# Patient Record
Sex: MALE | Race: WHITE | Employment: FULL TIME | URBAN - METROPOLITAN AREA
[De-identification: names, ages, dates, MRNs, and addresses within clinical notes are randomized per-mention and may not be internally consistent; named-entity substitution may affect disease eponyms.]

---

## 2017-03-20 RX ORDER — SIMVASTATIN 80 MG
TABLET ORAL
Qty: 90 TABLET | Refills: 1 | Status: SHIPPED | OUTPATIENT
Start: 2017-03-20 | End: 2017-07-14

## 2017-03-22 ENCOUNTER — OFFICE VISIT (OUTPATIENT)
Dept: INTERNAL MEDICINE CLINIC | Facility: CLINIC | Age: 67
End: 2017-03-22

## 2017-03-22 ENCOUNTER — HOSPITAL ENCOUNTER (OUTPATIENT)
Dept: CT IMAGING | Age: 67
Discharge: HOME | End: 2017-03-22
Attending: PHYSICIAN ASSISTANT
Payer: COMMERCIAL

## 2017-03-22 ENCOUNTER — TELEPHONE (OUTPATIENT)
Dept: INTERNAL MEDICINE CLINIC | Facility: CLINIC | Age: 67
End: 2017-03-22

## 2017-03-22 VITALS
HEART RATE: 56 BPM | DIASTOLIC BLOOD PRESSURE: 78 MMHG | BODY MASS INDEX: 24.47 KG/M2 | SYSTOLIC BLOOD PRESSURE: 132 MMHG | HEIGHT: 75 IN | TEMPERATURE: 98 F | WEIGHT: 196.81 LBS | RESPIRATION RATE: 16 BRPM

## 2017-03-22 DIAGNOSIS — R31.9 HEMATURIA: ICD-10-CM

## 2017-03-22 DIAGNOSIS — R31.9 HEMATURIA: Primary | ICD-10-CM

## 2017-03-22 LAB
BASOPHILS # BLD AUTO: 0.02 X10(3) UL (ref 0–0.1)
BASOPHILS NFR BLD AUTO: 0.3 %
EOSINOPHIL # BLD AUTO: 0.14 X10(3) UL (ref 0–0.3)
EOSINOPHIL NFR BLD AUTO: 2 %
ERYTHROCYTE [DISTWIDTH] IN BLOOD BY AUTOMATED COUNT: 11.9 % (ref 11.5–16)
HCT VFR BLD AUTO: 42.9 % (ref 37–53)
HGB BLD-MCNC: 14.7 G/DL (ref 13–17)
IMMATURE GRANULOCYTE COUNT: 0.03 X10(3) UL (ref 0–1)
IMMATURE GRANULOCYTE RATIO %: 0.4 %
LYMPHOCYTES # BLD AUTO: 2.5 X10(3) UL (ref 0.9–4)
LYMPHOCYTES NFR BLD AUTO: 34.9 %
MCH RBC QN AUTO: 34.2 PG (ref 27–33.2)
MCHC RBC AUTO-ENTMCNC: 34.3 G/DL (ref 31–37)
MCV RBC AUTO: 99.8 FL (ref 80–99)
MONOCYTES # BLD AUTO: 0.73 X10(3) UL (ref 0.1–0.6)
MONOCYTES NFR BLD AUTO: 10.2 %
MULTISTIX EXPIRATION DATE: ABNORMAL DATE
MULTISTIX LOT#: ABNORMAL NUMERIC
NEUTROPHIL ABS PRELIM: 3.74 X10 (3) UL (ref 1.3–6.7)
NEUTROPHILS # BLD AUTO: 3.74 X10(3) UL (ref 1.3–6.7)
NEUTROPHILS NFR BLD AUTO: 52.2 %
PH, URINE: 6 (ref 4.5–8)
PLATELET # BLD AUTO: 153 10(3)UL (ref 150–450)
PROTEIN (URINE DIPSTICK): 100 MG/DL
RBC # BLD AUTO: 4.3 X10(6)UL (ref 3.8–5.8)
RED CELL DISTRIBUTION WIDTH-SD: 43.3 FL (ref 35.1–46.3)
SPECIFIC GRAVITY: >1.03 (ref 1–1.03)
UROBILINOGEN,SEMI-QN: 1 MG/DL (ref 0–1.9)
WBC # BLD AUTO: 7.2 X10(3) UL (ref 4–13)

## 2017-03-22 PROCEDURE — 80053 COMPREHEN METABOLIC PANEL: CPT | Performed by: PHYSICIAN ASSISTANT

## 2017-03-22 PROCEDURE — 74178 CT ABD&PLV WO CNTR FLWD CNTR: CPT

## 2017-03-22 PROCEDURE — 87086 URINE CULTURE/COLONY COUNT: CPT | Performed by: PHYSICIAN ASSISTANT

## 2017-03-22 PROCEDURE — 85025 COMPLETE CBC W/AUTO DIFF WBC: CPT | Performed by: PHYSICIAN ASSISTANT

## 2017-03-22 PROCEDURE — 81003 URINALYSIS AUTO W/O SCOPE: CPT | Performed by: PHYSICIAN ASSISTANT

## 2017-03-22 PROCEDURE — 99214 OFFICE O/P EST MOD 30 MIN: CPT | Performed by: PHYSICIAN ASSISTANT

## 2017-03-22 NOTE — PROGRESS NOTES
Patient presents with:  Hematuria: Pt c/o dark red urine x 24 hrs- Pt denies any pain w/ urination       HPI:  Pt presents with c/o hematuria. Started last night. No pain. Some urinary frequency last night (up 5 times) but better today. No f/c.   No abd MOUTH ONCE DAILY Disp: 90 tablet Rfl: 1   AmLODIPine Besylate (NORVASC) 5 MG Oral Tab TAKE ONE TABLET BY MOUTH ONCE DAILY Disp: 90 tablet Rfl: 1   Multiple Vitamins-Minerals (MULTI FOR HIM 50+ OR) Take  by mouth.  Disp:  Rfl:    Aspirin 325 MG Oral Tab Take

## 2017-03-23 LAB
ALBUMIN SERPL-MCNC: 4.1 G/DL (ref 3.5–4.8)
ALP LIVER SERPL-CCNC: 65 U/L (ref 45–117)
ALT SERPL-CCNC: 35 U/L (ref 17–63)
AST SERPL-CCNC: 18 U/L (ref 15–41)
BILIRUB SERPL-MCNC: 0.9 MG/DL (ref 0.1–2)
BUN BLD-MCNC: 20 MG/DL (ref 8–20)
CALCIUM BLD-MCNC: 9.7 MG/DL (ref 8.3–10.3)
CHLORIDE: 104 MMOL/L (ref 101–111)
CO2: 26 MMOL/L (ref 22–32)
CREAT BLD-MCNC: 1.03 MG/DL (ref 0.7–1.3)
GLUCOSE BLD-MCNC: 121 MG/DL (ref 70–99)
M PROTEIN MFR SERPL ELPH: 7.9 G/DL (ref 6.1–8.3)
POTASSIUM SERPL-SCNC: 4.1 MMOL/L (ref 3.6–5.1)
SODIUM SERPL-SCNC: 140 MMOL/L (ref 136–144)

## 2017-03-24 DIAGNOSIS — R31.9 HEMATURIA: ICD-10-CM

## 2017-03-24 DIAGNOSIS — E11.9 TYPE 2 DIABETES MELLITUS WITHOUT COMPLICATION, WITHOUT LONG-TERM CURRENT USE OF INSULIN (HCC): ICD-10-CM

## 2017-03-24 DIAGNOSIS — K86.1 CHRONIC PANCREATITIS, UNSPECIFIED PANCREATITIS TYPE (HCC): Primary | ICD-10-CM

## 2017-03-24 DIAGNOSIS — E78.00 PURE HYPERCHOLESTEROLEMIA: Primary | ICD-10-CM

## 2017-03-24 DIAGNOSIS — K86.89 DILATION OF PANCREATIC DUCT: ICD-10-CM

## 2017-03-24 NOTE — PROGRESS NOTES
Quick Note:    Please given him Dr. Meredith Heads, Urologist information to call for appt. He will need further eval given the hematuria (even though as of yesterday it had resoved). He should plan to do this as soon as he returns from vacation.  He should also follo

## 2017-03-24 NOTE — PROGRESS NOTES
Quick Note:    Prelim ur cx shows no growth, please notify pt as he was in this week for painless hematuria and given the CT results he was aware that we were waiting on this result to determine if we should start abx.  See other result notes as well.  ____

## 2017-04-24 RX ORDER — LOSARTAN POTASSIUM 100 MG/1
TABLET ORAL
Qty: 90 TABLET | Refills: 0 | Status: SHIPPED | OUTPATIENT
Start: 2017-04-24 | End: 2017-07-14

## 2017-04-24 RX ORDER — ATENOLOL 50 MG/1
TABLET ORAL
Qty: 90 TABLET | Refills: 0 | Status: SHIPPED | OUTPATIENT
Start: 2017-04-24 | End: 2017-07-14

## 2017-04-27 ENCOUNTER — TELEPHONE (OUTPATIENT)
Dept: INTERNAL MEDICINE CLINIC | Facility: CLINIC | Age: 67
End: 2017-04-27

## 2017-04-27 NOTE — TELEPHONE ENCOUNTER
Vmmltcb. OK for psr to take the message unless there are other questions. Req cb to find out which brand/set of cards the pt was given. Where are they mailed to, [facility] or other? We can mail out a new set of cards. The order itself is still valid.  thx

## 2017-04-28 NOTE — TELEPHONE ENCOUNTER
S/w Pt states that he goes to [facility]. Cards were mailed as requested. Pt verbalized understanding, agreed with POC to complete all 3 cards. Pt no further questions.

## 2017-05-11 PROCEDURE — 82272 OCCULT BLD FECES 1-3 TESTS: CPT

## 2017-05-17 ENCOUNTER — LAB ENCOUNTER (OUTPATIENT)
Dept: LAB | Facility: HOSPITAL | Age: 67
End: 2017-05-17
Attending: INTERNAL MEDICINE
Payer: COMMERCIAL

## 2017-05-17 DIAGNOSIS — Z00.00 ROUTINE GENERAL MEDICAL EXAMINATION AT A HEALTH CARE FACILITY: Primary | ICD-10-CM

## 2017-06-15 RX ORDER — METFORMIN HYDROCHLORIDE 500 MG/1
TABLET, EXTENDED RELEASE ORAL
Qty: 30 TABLET | Refills: 0 | Status: SHIPPED | OUTPATIENT
Start: 2017-06-15 | End: 2017-07-14

## 2017-06-15 NOTE — TELEPHONE ENCOUNTER
Refilled for 30 days. Overdue for DM labs. Please remind pt to do. Also need to schedule DM follow up with JL.

## 2017-06-23 NOTE — TELEPHONE ENCOUNTER
Spoke with patient 6/15; lm 6/23. Per LL at staff meeting 6/20 once we have made contact with patient okay to close.

## 2017-07-07 ENCOUNTER — APPOINTMENT (OUTPATIENT)
Dept: LAB | Age: 67
End: 2017-07-07
Attending: INTERNAL MEDICINE
Payer: COMMERCIAL

## 2017-07-07 DIAGNOSIS — E78.00 PURE HYPERCHOLESTEROLEMIA: ICD-10-CM

## 2017-07-07 DIAGNOSIS — E11.9 TYPE 2 DIABETES MELLITUS WITHOUT COMPLICATION, WITHOUT LONG-TERM CURRENT USE OF INSULIN (HCC): ICD-10-CM

## 2017-07-07 LAB
ALBUMIN SERPL-MCNC: 4 G/DL (ref 3.5–4.8)
ALP LIVER SERPL-CCNC: 63 U/L (ref 45–117)
ALT SERPL-CCNC: 37 U/L (ref 17–63)
AST SERPL-CCNC: 18 U/L (ref 15–41)
BILIRUB SERPL-MCNC: 1.1 MG/DL (ref 0.1–2)
BUN BLD-MCNC: 17 MG/DL (ref 8–20)
CALCIUM BLD-MCNC: 8.7 MG/DL (ref 8.3–10.3)
CHLORIDE: 106 MMOL/L (ref 101–111)
CHOLEST SMN-MCNC: 147 MG/DL (ref ?–200)
CO2: 28 MMOL/L (ref 22–32)
CREAT BLD-MCNC: 1.22 MG/DL (ref 0.7–1.3)
CREAT UR-SCNC: 194 MG/DL
EST. AVERAGE GLUCOSE BLD GHB EST-MCNC: 128 MG/DL (ref 68–126)
GLUCOSE BLD-MCNC: 172 MG/DL (ref 70–99)
HBA1C MFR BLD HPLC: 6.1 % (ref ?–5.7)
HDLC SERPL-MCNC: 47 MG/DL (ref 45–?)
HDLC SERPL: 3.13 {RATIO} (ref ?–4.97)
LDLC SERPL CALC-MCNC: 57 MG/DL (ref ?–130)
M PROTEIN MFR SERPL ELPH: 7.6 G/DL (ref 6.1–8.3)
MICROALBUMIN UR-MCNC: 1.74 MG/DL
MICROALBUMIN/CREAT 24H UR-RTO: 9 UG/MG (ref ?–30)
NONHDLC SERPL-MCNC: 100 MG/DL (ref ?–130)
POTASSIUM SERPL-SCNC: 4.8 MMOL/L (ref 3.6–5.1)
SODIUM SERPL-SCNC: 139 MMOL/L (ref 136–144)
TRIGLYCERIDES: 217 MG/DL (ref ?–150)
VLDL: 43 MG/DL (ref 5–40)

## 2017-07-07 PROCEDURE — 80061 LIPID PANEL: CPT

## 2017-07-07 PROCEDURE — 80053 COMPREHEN METABOLIC PANEL: CPT

## 2017-07-07 PROCEDURE — 82043 UR ALBUMIN QUANTITATIVE: CPT

## 2017-07-07 PROCEDURE — 82570 ASSAY OF URINE CREATININE: CPT

## 2017-07-07 PROCEDURE — 83036 HEMOGLOBIN GLYCOSYLATED A1C: CPT

## 2017-07-14 ENCOUNTER — OFFICE VISIT (OUTPATIENT)
Dept: ENDOCRINOLOGY CLINIC | Facility: CLINIC | Age: 67
End: 2017-07-14

## 2017-07-14 VITALS
OXYGEN SATURATION: 98 % | BODY MASS INDEX: 24.91 KG/M2 | DIASTOLIC BLOOD PRESSURE: 72 MMHG | SYSTOLIC BLOOD PRESSURE: 136 MMHG | HEIGHT: 75 IN | WEIGHT: 200.38 LBS | TEMPERATURE: 99 F | HEART RATE: 55 BPM | RESPIRATION RATE: 16 BRPM

## 2017-07-14 DIAGNOSIS — E11.9 TYPE 2 DIABETES MELLITUS WITHOUT COMPLICATION, WITHOUT LONG-TERM CURRENT USE OF INSULIN (HCC): Primary | ICD-10-CM

## 2017-07-14 DIAGNOSIS — I10 HYPERTENSION, UNSPECIFIED TYPE: ICD-10-CM

## 2017-07-14 DIAGNOSIS — E78.5 DYSLIPIDEMIA: ICD-10-CM

## 2017-07-14 PROCEDURE — 99213 OFFICE O/P EST LOW 20 MIN: CPT | Performed by: INTERNAL MEDICINE

## 2017-07-14 RX ORDER — AMLODIPINE BESYLATE 5 MG/1
TABLET ORAL
Qty: 90 TABLET | Refills: 1 | Status: SHIPPED | OUTPATIENT
Start: 2017-07-14 | End: 2017-10-30

## 2017-07-14 RX ORDER — METFORMIN HYDROCHLORIDE 500 MG/1
TABLET, EXTENDED RELEASE ORAL
Qty: 90 TABLET | Refills: 1 | Status: SHIPPED | OUTPATIENT
Start: 2017-07-14 | End: 2017-10-30

## 2017-07-14 RX ORDER — ATENOLOL 50 MG/1
TABLET ORAL
Qty: 90 TABLET | Refills: 1 | Status: SHIPPED | OUTPATIENT
Start: 2017-07-14 | End: 2017-10-30

## 2017-07-14 RX ORDER — SIMVASTATIN 80 MG
TABLET ORAL
Qty: 90 TABLET | Refills: 1 | Status: SHIPPED | OUTPATIENT
Start: 2017-07-14 | End: 2017-10-30

## 2017-07-14 RX ORDER — GLIPIZIDE 2.5 MG/1
TABLET, EXTENDED RELEASE ORAL
Qty: 90 TABLET | Refills: 1 | Status: SHIPPED | OUTPATIENT
Start: 2017-07-14 | End: 2017-10-30

## 2017-07-14 RX ORDER — LOSARTAN POTASSIUM 100 MG/1
TABLET ORAL
Qty: 90 TABLET | Refills: 1 | Status: SHIPPED | OUTPATIENT
Start: 2017-07-14 | End: 2017-10-30

## 2017-07-14 NOTE — PROGRESS NOTES
HPI:    Patient ID: Jennifer Giron is a 78year old male.     HPI  Here for dm, htn, hypercholesterolemia, needs refills, feels well, no complaints, due for eye exam this fall  /72 (BP Location: Left arm, Patient Position: Sitting)   Pulse 55   Temp to person, place, and time. Bilateral lower extremities visually inspected. Bilateral foot monofilament skin exam done, and shows normal sensation bilaterally and normal pedal pulses bilaterally and no wounds. Skin: He is not diaphoretic.

## 2017-07-14 NOTE — PROGRESS NOTES
Pt new to [address] Explained balanced plate, natual progression of T2DB (and importance of regular physical activity and good BG control to possibly limit meds and slow progression),  benefits of regular physical activity.  Previous Diabetes Education: h

## 2017-10-19 ENCOUNTER — TELEPHONE (OUTPATIENT)
Dept: INTERNAL MEDICINE CLINIC | Facility: CLINIC | Age: 67
End: 2017-10-19

## 2017-10-19 NOTE — TELEPHONE ENCOUNTER
CPE Future Appointments  Date Time Provider Linda Maddox   10/30/2017 1:00 PM Duncan Andrew NP [facility]   11/14/2017 8:40 AM Hao Singh MD [facility]     Orders to [facility] [place]  aware must fast.  Pt stated BETINA informed him all

## 2017-10-30 ENCOUNTER — OFFICE VISIT (OUTPATIENT)
Dept: INTERNAL MEDICINE CLINIC | Facility: CLINIC | Age: 67
End: 2017-10-30

## 2017-10-30 VITALS
HEIGHT: 75 IN | WEIGHT: 200 LBS | HEART RATE: 96 BPM | RESPIRATION RATE: 17 BRPM | SYSTOLIC BLOOD PRESSURE: 132 MMHG | BODY MASS INDEX: 24.87 KG/M2 | TEMPERATURE: 98 F | DIASTOLIC BLOOD PRESSURE: 70 MMHG

## 2017-10-30 DIAGNOSIS — Z00.00 ROUTINE PHYSICAL EXAMINATION: Primary | ICD-10-CM

## 2017-10-30 DIAGNOSIS — E11.9 TYPE 2 DIABETES MELLITUS WITHOUT COMPLICATION, WITHOUT LONG-TERM CURRENT USE OF INSULIN (HCC): ICD-10-CM

## 2017-10-30 DIAGNOSIS — I10 ESSENTIAL HYPERTENSION WITH GOAL BLOOD PRESSURE LESS THAN 130/80: ICD-10-CM

## 2017-10-30 DIAGNOSIS — E78.00 PURE HYPERCHOLESTEROLEMIA: ICD-10-CM

## 2017-10-30 DIAGNOSIS — D69.6 THROMBOCYTOPENIA (HCC): ICD-10-CM

## 2017-10-30 DIAGNOSIS — Z23 FLU VACCINE NEED: ICD-10-CM

## 2017-10-30 DIAGNOSIS — I25.10 CORONARY ARTERY DISEASE INVOLVING NATIVE HEART WITHOUT ANGINA PECTORIS, UNSPECIFIED VESSEL OR LESION TYPE: ICD-10-CM

## 2017-10-30 PROCEDURE — 90686 IIV4 VACC NO PRSV 0.5 ML IM: CPT | Performed by: NURSE PRACTITIONER

## 2017-10-30 PROCEDURE — 99397 PER PM REEVAL EST PAT 65+ YR: CPT | Performed by: NURSE PRACTITIONER

## 2017-10-30 PROCEDURE — 90471 IMMUNIZATION ADMIN: CPT | Performed by: NURSE PRACTITIONER

## 2017-10-30 RX ORDER — GLIPIZIDE 2.5 MG/1
TABLET, EXTENDED RELEASE ORAL
Qty: 90 TABLET | Refills: 1 | Status: SHIPPED | OUTPATIENT
Start: 2017-10-30 | End: 2018-07-25

## 2017-10-30 RX ORDER — LOSARTAN POTASSIUM 100 MG/1
TABLET ORAL
Qty: 90 TABLET | Refills: 1 | Status: SHIPPED | OUTPATIENT
Start: 2017-10-30 | End: 2018-07-26

## 2017-10-30 RX ORDER — ATENOLOL 50 MG/1
TABLET ORAL
Qty: 90 TABLET | Refills: 1 | Status: SHIPPED | OUTPATIENT
Start: 2017-10-30 | End: 2019-02-26

## 2017-10-30 RX ORDER — METFORMIN HYDROCHLORIDE 500 MG/1
TABLET, EXTENDED RELEASE ORAL
Qty: 90 TABLET | Refills: 1 | Status: SHIPPED | OUTPATIENT
Start: 2017-10-30 | End: 2018-07-12

## 2017-10-30 RX ORDER — SIMVASTATIN 80 MG
TABLET ORAL
Qty: 90 TABLET | Refills: 1 | Status: SHIPPED | OUTPATIENT
Start: 2017-10-30 | End: 2018-09-26

## 2017-10-30 RX ORDER — AMLODIPINE BESYLATE 5 MG/1
TABLET ORAL
Qty: 90 TABLET | Refills: 1 | Status: SHIPPED | OUTPATIENT
Start: 2017-10-30 | End: 2018-07-26

## 2017-10-30 NOTE — PROGRESS NOTES
Keyonna Tovar is a 78year old male who presents for a complete physical exam.     HPI:   Pt has no complaints. Type 2 DM- taking medications as ordered without side effects. Did no bring glucometer.  Denies polyuria, polydipsia, polyphagia, shakines 100 MG Oral Tab TAKE ONE TABLET BY MOUTH ONCE DAILY Disp: 90 tablet Rfl: 1   simvastatin 80 MG Oral Tab TAKE ONE TABLET BY MOUTH IN THE EVENING Disp: 90 tablet Rfl: 1   GlipiZIDE ER 2.5 MG Oral Tablet 24 Hr TAKE ONE TABLET BY MOUTH ONCE DAILY WITH BREAKFAS immunization records.     Immunization History  Administered            Date(s) Administered    >= 3 Yrs, Influenza Vaccine,(59167),[facility]                          09/19/2013      >=3 YRS FLUZONE OR FLUARIX QUAD PRESERVE FREE SINGLE DOSE (96905) FLU CLI normocephalic,ears and throat are clear. EYES: PERRLA, EOMI. Conjunctiva are clear. Sclera white  NECK: supple w/o masses/tenderness/ adenopathy, no bruits/JVD, Thyroid symmetrical w/o       enlargement  LUNGS: clear to auscultation bilaterally.  Symmetric last blood work. Continue to monitor.      Flu vaccine need      Orders Placed This Encounter      Fluad 0.5 ml  65 years and older (10709)    [facility] for this Visit:  No prescriptions requested or ordered in this encounter    Imaging & Consults:  FL

## 2017-12-07 ENCOUNTER — OFFICE VISIT (OUTPATIENT)
Dept: INTERNAL MEDICINE CLINIC | Facility: CLINIC | Age: 67
End: 2017-12-07

## 2017-12-07 VITALS
BODY MASS INDEX: 25.36 KG/M2 | HEART RATE: 68 BPM | TEMPERATURE: 99 F | SYSTOLIC BLOOD PRESSURE: 134 MMHG | DIASTOLIC BLOOD PRESSURE: 74 MMHG | HEIGHT: 75 IN | WEIGHT: 204 LBS

## 2017-12-07 DIAGNOSIS — M25.512 LEFT SHOULDER PAIN, UNSPECIFIED CHRONICITY: Primary | ICD-10-CM

## 2017-12-07 PROCEDURE — 99212 OFFICE O/P EST SF 10 MIN: CPT | Performed by: INTERNAL MEDICINE

## 2017-12-07 NOTE — PATIENT INSTRUCTIONS
Christian Velásquez, take PO Tylenol for your discomfort and undergo physical therapy. Please call the office or schedule an appointment if your symptoms worsen or do not improve after at least two sessions of physical therapy.     Frozen Shoulder (Adhesive Capsulitis Mild cases may be treated with a home exercise program and anti-inflammatory medicines. More severe cases require physical therapy. In some cases a steroid is shot, or injected, into the shoulder area.  In hard to treat cases, forced movement of the shoulde

## 2017-12-07 NOTE — PROGRESS NOTES
[facility]  6/13/1950    Patient presents with:  Shoulder Pain: Left shoulder pain for a little over a month. Limited range of motion.        Anmol Valenzuela is a 78year old male who presents with a one-month history of left shoulder pa Multiple Vitamins-Minerals (MULTI FOR HIM 50+ OR) Take  by mouth. Disp:  Rfl:    Aspirin 325 MG Oral Tab Take 325 mg by mouth daily.  Disp:  Rfl:       No Known Allergies   Past Medical History:   Diagnosis Date   • Coronary atherosclerosis    • Diabetes (H /74 (BP Location: Left arm, Patient Position: Sitting, Cuff Size: adult)   Pulse 68   Temp 98.8 °F (37.1 °C) (Oral)   Ht 75\"   Wt 204 lb   BMI 25.50 kg/m²   Constitutional: Oriented to person, place, and time. No distress.    Neck: Normal range of mo Frozen shoulder is when pain and stiffness make it difficult to move your shoulder normally. This condition is also called adhesive capsulitis. The shoulder is a ball-and-socket joint.  The ball on the upper arm bone fits into a socket on the shoulder nicole Mild cases may be treated with a home exercise program and anti-inflammatory medicines. More severe cases require physical therapy. In some cases a steroid is shot, or injected, into the shoulder area.  In hard to treat cases, forced movement of the shoulde All questions were answered and the patient agrees with the plan.      Thank you,  Sj Bojorquez MD

## 2018-01-11 ENCOUNTER — HOSPITAL ENCOUNTER (OUTPATIENT)
Dept: PHYSICAL THERAPY | Facility: HOSPITAL | Age: 68
Setting detail: THERAPIES SERIES
Discharge: HOME | End: 2018-01-11
Attending: INTERNAL MEDICINE
Payer: COMMERCIAL

## 2018-01-11 DIAGNOSIS — M25.512 LEFT SHOULDER PAIN, UNSPECIFIED CHRONICITY: ICD-10-CM

## 2018-01-11 PROCEDURE — 97162 PT EVAL MOD COMPLEX 30 MIN: CPT | Performed by: PHYSICAL THERAPIST

## 2018-01-11 PROCEDURE — 97110 THERAPEUTIC EXERCISES: CPT | Performed by: PHYSICAL THERAPIST

## 2018-01-11 NOTE — PROGRESS NOTES
UPPER EXTREMITY EVALUATION:   Referring Physician: Dr. Selina Khan  Diagnosis: Shoulder pain LEFT Date of Service: 1/11/2018     PATIENT SUMMARY   Nicolasa Parker is a 78year old y/o male who presents to therapy today with complaints of left shoulder pain  \" return , and increase overall strength , and return to pain free adl's;    Precautions:  None  OBJECTIVE:   Observation/Posture: Good upright posture. Slight protraction of scap noted.     Cervical Screen:  NL, no pain   Palpation: + tenderness along supra hair (8 visits)  · Pt will increase shoulder AROM to 85 deg ER and 90 deg ABD to reach and fasten seatbelt (8 visits)  · Pt will increase shoulder AROM IR to 70 deg to be able to reach in back pocket, tuck in shirt, and turn steering wheel without pain (8 treatment and while under my care.     X___________________________________________________ Date____________________    Certification From: 8/58/9098  To:4/11/2018

## 2018-01-16 ENCOUNTER — HOSPITAL ENCOUNTER (OUTPATIENT)
Dept: PHYSICAL THERAPY | Facility: HOSPITAL | Age: 68
Setting detail: THERAPIES SERIES
Discharge: HOME | End: 2018-01-16
Attending: INTERNAL MEDICINE
Payer: COMMERCIAL

## 2018-01-16 PROCEDURE — 97140 MANUAL THERAPY 1/> REGIONS: CPT | Performed by: PHYSICAL THERAPIST

## 2018-01-16 PROCEDURE — 97110 THERAPEUTIC EXERCISES: CPT | Performed by: PHYSICAL THERAPIST

## 2018-01-16 NOTE — PROGRESS NOTES
Dx: left shoulder pain/adhesives capsulitis/impingement        Authorized # of Visits:  8         Next MD visit: none scheduled  Fall Risk: standard         Precautions: n/a             Subjective: Shoulder was  little  sore after the first visit.    Object

## 2018-01-18 ENCOUNTER — HOSPITAL ENCOUNTER (OUTPATIENT)
Dept: PHYSICAL THERAPY | Facility: HOSPITAL | Age: 68
Setting detail: THERAPIES SERIES
Discharge: HOME | End: 2018-01-18
Attending: INTERNAL MEDICINE
Payer: COMMERCIAL

## 2018-01-18 PROCEDURE — 97140 MANUAL THERAPY 1/> REGIONS: CPT | Performed by: PHYSICAL THERAPIST

## 2018-01-18 PROCEDURE — 97110 THERAPEUTIC EXERCISES: CPT | Performed by: PHYSICAL THERAPIST

## 2018-01-19 NOTE — PROGRESS NOTES
Dx: left shoulder pain/adhesives capsulitis/impingement        Authorized # of Visits:  8         Next MD visit: none scheduled  Fall Risk: standard         Precautions: n/a             Subjective: Feels like my shoulder is getting a little looser.     Mort Malling

## 2018-01-23 ENCOUNTER — HOSPITAL ENCOUNTER (OUTPATIENT)
Dept: PHYSICAL THERAPY | Facility: HOSPITAL | Age: 68
Setting detail: THERAPIES SERIES
Discharge: HOME | End: 2018-01-23
Attending: INTERNAL MEDICINE
Payer: COMMERCIAL

## 2018-01-23 PROCEDURE — 97140 MANUAL THERAPY 1/> REGIONS: CPT | Performed by: PHYSICAL THERAPIST

## 2018-01-23 PROCEDURE — 97110 THERAPEUTIC EXERCISES: CPT | Performed by: PHYSICAL THERAPIST

## 2018-01-23 NOTE — PROGRESS NOTES
Dx: left shoulder pain/adhesives capsulitis/impingement        Authorized # of Visits:  8         Next MD visit: none scheduled  Fall Risk: standard         Precautions: n/a             Subjective: Continues to be tight,  tender along the top part of my sh

## 2018-01-25 ENCOUNTER — HOSPITAL ENCOUNTER (OUTPATIENT)
Dept: PHYSICAL THERAPY | Facility: HOSPITAL | Age: 68
Setting detail: THERAPIES SERIES
Discharge: HOME | End: 2018-01-25
Attending: INTERNAL MEDICINE
Payer: COMMERCIAL

## 2018-01-25 PROCEDURE — 97140 MANUAL THERAPY 1/> REGIONS: CPT | Performed by: PHYSICAL THERAPIST

## 2018-01-25 PROCEDURE — 97110 THERAPEUTIC EXERCISES: CPT | Performed by: PHYSICAL THERAPIST

## 2018-01-26 NOTE — PROGRESS NOTES
Dx: left shoulder pain/adhesives capsulitis/impingement        Authorized # of Visits:  8         Next MD visit: none scheduled  Fall Risk: standard         Precautions: n/a             Subjective: Shoulder feels good overall and is getting a little bit be

## 2018-01-30 ENCOUNTER — HOSPITAL ENCOUNTER (OUTPATIENT)
Dept: PHYSICAL THERAPY | Facility: HOSPITAL | Age: 68
Setting detail: THERAPIES SERIES
Discharge: HOME | End: 2018-01-30
Attending: INTERNAL MEDICINE
Payer: COMMERCIAL

## 2018-01-30 PROCEDURE — 97112 NEUROMUSCULAR REEDUCATION: CPT | Performed by: PHYSICAL THERAPIST

## 2018-01-30 PROCEDURE — 97110 THERAPEUTIC EXERCISES: CPT | Performed by: PHYSICAL THERAPIST

## 2018-01-30 PROCEDURE — 97140 MANUAL THERAPY 1/> REGIONS: CPT | Performed by: PHYSICAL THERAPIST

## 2018-01-30 NOTE — PROGRESS NOTES
Dx: left shoulder pain/adhesives capsulitis/impingement        Authorized # of Visits:  8         Next MD visit: none scheduled  Fall Risk: standard         Precautions: n/a             Subjective: Feel like I can reach the other side of my shoulder better x5' UBE x5'     Theraband rowsx20   shoulder extension x20  IR/ER x20        Rows  IR/ER   Extension   Red TB x20  Pulleys x2' each direction ab/flex  Pulleys x2' flex/abduction  Pulley x3'      Side lying ER/IR 2# x10  FATIGUE  Pulleys x2'   flex   and  e

## 2018-02-06 ENCOUNTER — HOSPITAL ENCOUNTER (OUTPATIENT)
Dept: PHYSICAL THERAPY | Facility: HOSPITAL | Age: 68
Setting detail: THERAPIES SERIES
Discharge: HOME | End: 2018-02-06
Attending: INTERNAL MEDICINE
Payer: COMMERCIAL

## 2018-02-06 PROCEDURE — 97140 MANUAL THERAPY 1/> REGIONS: CPT | Performed by: PHYSICAL THERAPIST

## 2018-02-06 PROCEDURE — 97110 THERAPEUTIC EXERCISES: CPT | Performed by: PHYSICAL THERAPIST

## 2018-02-06 NOTE — PROGRESS NOTES
Dx: left shoulder pain/adhesives capsulitis/impingement        Authorized # of Visits:  8         Next MD visit: none scheduled  Fall Risk: standard         Precautions: n/a             Subjective: Still note jacquie and discomfort when trying to use left UE UBEx5'    Theraband rowsx20   shoulder extension x20  IR/ER x20        Rows  IR/ER   Extension   Red TB x20  Pulleys x2' each direction ab/flex  Pulleys x2' flex/abduction  Pulley x3'  Pulleys x3' each direction, including flex, abd    Side lying ER/IR 2#

## 2018-03-06 NOTE — ADDENDUM NOTE
Encounter addended by: Elkin Huertas PT on: 3/6/2018  9:16 AM<BR>    Actions taken: Charge Capture section accepted

## 2018-05-17 ENCOUNTER — TELEPHONE (OUTPATIENT)
Dept: INTERNAL MEDICINE CLINIC | Facility: CLINIC | Age: 68
End: 2018-05-17

## 2018-05-17 DIAGNOSIS — E11.9 TYPE 2 DIABETES MELLITUS WITHOUT COMPLICATION, WITHOUT LONG-TERM CURRENT USE OF INSULIN (HCC): Primary | ICD-10-CM

## 2018-05-17 NOTE — TELEPHONE ENCOUNTER
Has upcoming appt with BETINA in July   Please let him know labs are in [address] [place] to do before ov.

## 2018-06-14 ENCOUNTER — OFFICE VISIT (OUTPATIENT)
Dept: INTERNAL MEDICINE CLINIC | Facility: CLINIC | Age: 68
End: 2018-06-14

## 2018-06-14 VITALS
SYSTOLIC BLOOD PRESSURE: 118 MMHG | OXYGEN SATURATION: 99 % | HEART RATE: 64 BPM | WEIGHT: 201 LBS | RESPIRATION RATE: 14 BRPM | TEMPERATURE: 98 F | DIASTOLIC BLOOD PRESSURE: 72 MMHG | HEIGHT: 75 IN | BODY MASS INDEX: 24.99 KG/M2

## 2018-06-14 DIAGNOSIS — E11.9 TYPE 2 DIABETES MELLITUS WITHOUT COMPLICATION, WITHOUT LONG-TERM CURRENT USE OF INSULIN (HCC): ICD-10-CM

## 2018-06-14 DIAGNOSIS — R05.9 COUGH: Primary | ICD-10-CM

## 2018-06-14 PROCEDURE — 99213 OFFICE O/P EST LOW 20 MIN: CPT | Performed by: PHYSICIAN ASSISTANT

## 2018-06-14 RX ORDER — FLUTICASONE PROPIONATE 50 MCG
2 SPRAY, SUSPENSION (ML) NASAL DAILY
Qty: 1 BOTTLE | Refills: 0 | Status: SHIPPED | OUTPATIENT
Start: 2018-06-14 | End: 2018-10-26

## 2018-07-06 ENCOUNTER — TELEPHONE (OUTPATIENT)
Dept: ENDOCRINOLOGY CLINIC | Facility: CLINIC | Age: 68
End: 2018-07-06

## 2018-07-12 NOTE — TELEPHONE ENCOUNTER
Did not pass protocol- pt due for A1c  Labs were ordered for upcoming DM appt for 7/25/18 w/ BETINA  Per protocol routed to provider

## 2018-07-13 RX ORDER — METFORMIN HYDROCHLORIDE 500 MG/1
TABLET, EXTENDED RELEASE ORAL
Qty: 90 TABLET | Refills: 0 | Status: SHIPPED | OUTPATIENT
Start: 2018-07-13 | End: 2018-07-25

## 2018-07-13 NOTE — TELEPHONE ENCOUNTER
Future Appointments  Date Time Provider Linda Maddox   7/25/2018 11:30 AM Claudia Slaughter MD [facility]     LM to get labs done prior to appt

## 2018-07-18 ENCOUNTER — APPOINTMENT (OUTPATIENT)
Dept: LAB | Age: 68
End: 2018-07-18
Attending: NURSE PRACTITIONER
Payer: COMMERCIAL

## 2018-07-18 DIAGNOSIS — E11.9 TYPE 2 DIABETES MELLITUS WITHOUT COMPLICATION, WITHOUT LONG-TERM CURRENT USE OF INSULIN (HCC): ICD-10-CM

## 2018-07-18 LAB
ALBUMIN SERPL-MCNC: 4 G/DL (ref 3.5–4.8)
ALBUMIN/GLOB SERPL: 1.1 {RATIO} (ref 1–2)
ALP LIVER SERPL-CCNC: 67 U/L (ref 45–117)
ALT SERPL-CCNC: 79 U/L (ref 17–63)
ANION GAP SERPL CALC-SCNC: 10 MMOL/L (ref 0–18)
AST SERPL-CCNC: 66 U/L (ref 15–41)
BILIRUB SERPL-MCNC: 1.1 MG/DL (ref 0.1–2)
BUN BLD-MCNC: 22 MG/DL (ref 8–20)
BUN/CREAT SERPL: 18.8 (ref 10–20)
CALCIUM BLD-MCNC: 8.9 MG/DL (ref 8.3–10.3)
CHLORIDE: 103 MMOL/L (ref 101–111)
CHOLEST SMN-MCNC: 142 MG/DL (ref ?–200)
CO2: 25 MMOL/L (ref 22–32)
CREAT BLD-MCNC: 1.17 MG/DL (ref 0.7–1.3)
CREAT UR-SCNC: 262 MG/DL
EST. AVERAGE GLUCOSE BLD GHB EST-MCNC: 203 MG/DL (ref 68–126)
GLOBULIN PLAS-MCNC: 3.8 G/DL (ref 2.5–3.7)
GLUCOSE BLD-MCNC: 209 MG/DL (ref 70–99)
HBA1C MFR BLD HPLC: 8.7 % (ref ?–5.7)
HDLC SERPL-MCNC: 45 MG/DL (ref 45–?)
HDLC SERPL: 3.16 {RATIO} (ref ?–4.97)
LDLC SERPL CALC-MCNC: 67 MG/DL (ref ?–130)
M PROTEIN MFR SERPL ELPH: 7.8 G/DL (ref 6.1–8.3)
MICROALBUMIN UR-MCNC: 3.22 MG/DL
MICROALBUMIN/CREAT 24H UR-RTO: 12.3 UG/MG (ref ?–30)
NONHDLC SERPL-MCNC: 97 MG/DL (ref ?–130)
OSMOLALITY SERPL CALC.SUM OF ELEC: 295 MOSM/KG (ref 275–295)
POTASSIUM SERPL-SCNC: 4.5 MMOL/L (ref 3.6–5.1)
SODIUM SERPL-SCNC: 138 MMOL/L (ref 136–144)
TRIGL SERPL-MCNC: 150 MG/DL (ref ?–150)
VLDLC SERPL CALC-MCNC: 30 MG/DL (ref 5–40)

## 2018-07-18 PROCEDURE — 83036 HEMOGLOBIN GLYCOSYLATED A1C: CPT

## 2018-07-18 PROCEDURE — 80053 COMPREHEN METABOLIC PANEL: CPT

## 2018-07-18 PROCEDURE — 80061 LIPID PANEL: CPT

## 2018-07-18 PROCEDURE — 82570 ASSAY OF URINE CREATININE: CPT

## 2018-07-18 PROCEDURE — 82043 UR ALBUMIN QUANTITATIVE: CPT

## 2018-07-19 DIAGNOSIS — R74.8 ELEVATED LIVER ENZYMES: Primary | ICD-10-CM

## 2018-07-20 ENCOUNTER — APPOINTMENT (OUTPATIENT)
Dept: LAB | Age: 68
End: 2018-07-20
Attending: INTERNAL MEDICINE
Payer: COMMERCIAL

## 2018-07-20 DIAGNOSIS — R74.8 ELEVATED LIVER ENZYMES: ICD-10-CM

## 2018-07-20 LAB
ALBUMIN SERPL-MCNC: 3.8 G/DL (ref 3.5–4.8)
ALP LIVER SERPL-CCNC: 66 U/L (ref 45–117)
ALT SERPL-CCNC: 79 U/L (ref 17–63)
AST SERPL-CCNC: 53 U/L (ref 15–41)
BILIRUB DIRECT SERPL-MCNC: 0.3 MG/DL (ref 0.1–0.5)
BILIRUB SERPL-MCNC: 0.9 MG/DL (ref 0.1–2)
DEPRECATED HBV CORE AB SER IA-ACNC: 251.4 NG/ML (ref 30–530)
HAV IGM SER QL: NONREACTIVE
HBV CORE IGM SER QL: NONREACTIVE
HBV SURFACE AG SERPL QL IA: NONREACTIVE
HCV AB SERPL QL IA: NONREACTIVE
M PROTEIN MFR SERPL ELPH: 7.7 G/DL (ref 6.1–8.3)

## 2018-07-20 PROCEDURE — 80076 HEPATIC FUNCTION PANEL: CPT

## 2018-07-20 PROCEDURE — 82728 ASSAY OF FERRITIN: CPT

## 2018-07-20 PROCEDURE — 80074 ACUTE HEPATITIS PANEL: CPT

## 2018-07-25 ENCOUNTER — OFFICE VISIT (OUTPATIENT)
Dept: ENDOCRINOLOGY CLINIC | Facility: CLINIC | Age: 68
End: 2018-07-25

## 2018-07-25 VITALS
RESPIRATION RATE: 18 BRPM | TEMPERATURE: 99 F | HEIGHT: 75 IN | DIASTOLIC BLOOD PRESSURE: 64 MMHG | BODY MASS INDEX: 24.87 KG/M2 | WEIGHT: 200 LBS | HEART RATE: 64 BPM | SYSTOLIC BLOOD PRESSURE: 110 MMHG

## 2018-07-25 DIAGNOSIS — E78.00 PURE HYPERCHOLESTEROLEMIA: ICD-10-CM

## 2018-07-25 DIAGNOSIS — R05.9 COUGH: ICD-10-CM

## 2018-07-25 DIAGNOSIS — Z12.11 SPECIAL SCREENING FOR MALIGNANT NEOPLASMS, COLON: ICD-10-CM

## 2018-07-25 DIAGNOSIS — E11.9 TYPE 2 DIABETES MELLITUS WITHOUT COMPLICATION, WITHOUT LONG-TERM CURRENT USE OF INSULIN (HCC): Primary | ICD-10-CM

## 2018-07-25 DIAGNOSIS — R79.89 ELEVATED LFTS: ICD-10-CM

## 2018-07-25 PROCEDURE — 99214 OFFICE O/P EST MOD 30 MIN: CPT | Performed by: INTERNAL MEDICINE

## 2018-07-25 RX ORDER — METFORMIN HYDROCHLORIDE 500 MG/1
TABLET, EXTENDED RELEASE ORAL
Qty: 270 TABLET | Refills: 1 | Status: SHIPPED | OUTPATIENT
Start: 2018-07-25 | End: 2019-01-30

## 2018-07-25 RX ORDER — GLIPIZIDE 5 MG/1
5 TABLET, FILM COATED, EXTENDED RELEASE ORAL DAILY
Qty: 90 TABLET | Refills: 1 | Status: SHIPPED | OUTPATIENT
Start: 2018-07-25 | End: 2019-01-30

## 2018-07-25 NOTE — PROGRESS NOTES
Recommendation: Test BG more often, increase med(s)    A1C: 8.7% 7/18/18, up drastically from 6.1% 7/7/17. He was last here 7/2017 and was supposed to f/u in the fall 2017   Wt stable from last year    Diet: states he has been eating too much starch.  Revie

## 2018-07-25 NOTE — PROGRESS NOTES
HPI:    Patient ID: Taylor Argueta is a 80year old male.     HPI  Here for dm, sugars up a bunch, pt admits to poor diet, co hoarse voice, worse in am, occ cough, has had egd with [facility], due for stool cards, lft's higher, drinks one drink a day  BP 11 rhythm and normal heart sounds. No murmur heard. Pulmonary/Chest: Effort normal and breath sounds normal. He has no wheezes. Musculoskeletal: He exhibits no edema. Neurological: He is oriented to person, place, and time.    Skin: He is not diaphoret

## 2018-07-26 RX ORDER — LOSARTAN POTASSIUM 100 MG/1
TABLET ORAL
Qty: 90 TABLET | Refills: 1 | Status: SHIPPED | OUTPATIENT
Start: 2018-07-26 | End: 2019-01-30

## 2018-07-26 RX ORDER — AMLODIPINE BESYLATE 5 MG/1
TABLET ORAL
Qty: 90 TABLET | Refills: 1 | Status: SHIPPED | OUTPATIENT
Start: 2018-07-26 | End: 2018-09-05

## 2018-07-26 RX ORDER — ATENOLOL 50 MG/1
TABLET ORAL
Qty: 90 TABLET | Refills: 1 | Status: SHIPPED | OUTPATIENT
Start: 2018-07-26 | End: 2018-09-05

## 2018-08-09 ENCOUNTER — TELEPHONE (OUTPATIENT)
Dept: ENDOCRINOLOGY CLINIC | Facility: CLINIC | Age: 68
End: 2018-08-09

## 2018-08-10 ENCOUNTER — HOSPITAL ENCOUNTER (OUTPATIENT)
Dept: ULTRASOUND IMAGING | Facility: HOSPITAL | Age: 68
Discharge: HOME | End: 2018-08-10
Attending: INTERNAL MEDICINE
Payer: COMMERCIAL

## 2018-08-10 ENCOUNTER — HOSPITAL ENCOUNTER (OUTPATIENT)
Dept: GENERAL RADIOLOGY | Facility: HOSPITAL | Age: 68
Discharge: HOME | End: 2018-08-10
Attending: INTERNAL MEDICINE
Payer: COMMERCIAL

## 2018-08-10 DIAGNOSIS — R05.9 COUGH: ICD-10-CM

## 2018-08-10 DIAGNOSIS — R79.89 ELEVATED LFTS: ICD-10-CM

## 2018-08-10 PROCEDURE — 71046 X-RAY EXAM CHEST 2 VIEWS: CPT | Performed by: INTERNAL MEDICINE

## 2018-08-10 PROCEDURE — 76700 US EXAM ABDOM COMPLETE: CPT | Performed by: INTERNAL MEDICINE

## 2018-08-10 NOTE — TELEPHONE ENCOUNTER
MD Khoi Samayoa, lElis Stevens RD,LDN,CDE; [facility] 11 hours ago (9:34 PM)     Please call pt and remind to send in stool cards (Routing comment)

## 2018-08-10 NOTE — TELEPHONE ENCOUNTER
LM on  at [phone] reminding to complete stool cards. Patient to call back with any further questions.

## 2018-08-12 ENCOUNTER — APPOINTMENT (OUTPATIENT)
Dept: LAB | Facility: HOSPITAL | Age: 68
End: 2018-08-12
Attending: INTERNAL MEDICINE
Payer: COMMERCIAL

## 2018-08-12 DIAGNOSIS — Z12.11 SPECIAL SCREENING FOR MALIGNANT NEOPLASMS, COLON: ICD-10-CM

## 2018-08-12 PROCEDURE — 82270 OCCULT BLOOD FECES: CPT

## 2018-08-12 PROCEDURE — 82272 OCCULT BLD FECES 1-3 TESTS: CPT

## 2018-08-13 ENCOUNTER — TELEPHONE (OUTPATIENT)
Dept: INTERNAL MEDICINE CLINIC | Facility: CLINIC | Age: 68
End: 2018-08-13

## 2018-08-13 RX ORDER — AZITHROMYCIN 250 MG/1
TABLET, FILM COATED ORAL
Qty: 6 TABLET | Refills: 0 | Status: SHIPPED | OUTPATIENT
Start: 2018-08-13 | End: 2018-10-26

## 2018-08-17 ENCOUNTER — MED REC SCAN ONLY (OUTPATIENT)
Dept: INTERNAL MEDICINE CLINIC | Facility: CLINIC | Age: 68
End: 2018-08-17

## 2018-08-28 ENCOUNTER — APPOINTMENT (OUTPATIENT)
Dept: LAB | Age: 68
End: 2018-08-28
Attending: INTERNAL MEDICINE
Payer: COMMERCIAL

## 2018-08-28 DIAGNOSIS — R79.89 ELEVATED LFTS: ICD-10-CM

## 2018-08-28 DIAGNOSIS — E11.9 TYPE 2 DIABETES MELLITUS WITHOUT COMPLICATION, WITHOUT LONG-TERM CURRENT USE OF INSULIN (HCC): ICD-10-CM

## 2018-08-28 LAB
ALBUMIN SERPL-MCNC: 3.9 G/DL (ref 3.5–4.8)
ALBUMIN/GLOB SERPL: 1.1 {RATIO} (ref 1–2)
ALP LIVER SERPL-CCNC: 63 U/L (ref 45–117)
ALT SERPL-CCNC: 74 U/L (ref 17–63)
ANION GAP SERPL CALC-SCNC: 9 MMOL/L (ref 0–18)
AST SERPL-CCNC: 45 U/L (ref 15–41)
BILIRUB SERPL-MCNC: 0.9 MG/DL (ref 0.1–2)
BUN BLD-MCNC: 22 MG/DL (ref 8–20)
BUN/CREAT SERPL: 21.2 (ref 10–20)
CALCIUM BLD-MCNC: 9.2 MG/DL (ref 8.3–10.3)
CHLORIDE SERPL-SCNC: 105 MMOL/L (ref 101–111)
CO2 SERPL-SCNC: 25 MMOL/L (ref 22–32)
CREAT BLD-MCNC: 1.04 MG/DL (ref 0.7–1.3)
GLOBULIN PLAS-MCNC: 3.7 G/DL (ref 2.5–4)
GLUCOSE BLD-MCNC: 142 MG/DL (ref 70–99)
M PROTEIN MFR SERPL ELPH: 7.6 G/DL (ref 6.1–8.3)
OSMOLALITY SERPL CALC.SUM OF ELEC: 294 MOSM/KG (ref 275–295)
POTASSIUM SERPL-SCNC: 5 MMOL/L (ref 3.6–5.1)
SODIUM SERPL-SCNC: 139 MMOL/L (ref 136–144)

## 2018-08-28 PROCEDURE — 80053 COMPREHEN METABOLIC PANEL: CPT

## 2018-08-29 DIAGNOSIS — R79.89 ELEVATED LFTS: Primary | ICD-10-CM

## 2018-09-04 ENCOUNTER — TELEPHONE (OUTPATIENT)
Dept: INTERNAL MEDICINE CLINIC | Facility: CLINIC | Age: 68
End: 2018-09-04

## 2018-09-05 ENCOUNTER — TELEPHONE (OUTPATIENT)
Dept: INTERNAL MEDICINE CLINIC | Facility: CLINIC | Age: 68
End: 2018-09-05

## 2018-09-05 ENCOUNTER — OFFICE VISIT (OUTPATIENT)
Dept: INTERNAL MEDICINE CLINIC | Facility: CLINIC | Age: 68
End: 2018-09-05

## 2018-09-05 VITALS
BODY MASS INDEX: 24.25 KG/M2 | DIASTOLIC BLOOD PRESSURE: 58 MMHG | RESPIRATION RATE: 20 BRPM | HEIGHT: 75 IN | TEMPERATURE: 99 F | WEIGHT: 195 LBS | HEART RATE: 68 BPM | SYSTOLIC BLOOD PRESSURE: 90 MMHG

## 2018-09-05 DIAGNOSIS — Z00.00 ROUTINE GENERAL MEDICAL EXAMINATION AT A HEALTH CARE FACILITY: Primary | ICD-10-CM

## 2018-09-05 DIAGNOSIS — R79.89 ELEVATED LFTS: ICD-10-CM

## 2018-09-05 DIAGNOSIS — E11.9 TYPE 2 DIABETES MELLITUS WITHOUT COMPLICATION, WITHOUT LONG-TERM CURRENT USE OF INSULIN (HCC): Primary | ICD-10-CM

## 2018-09-05 DIAGNOSIS — I10 ESSENTIAL HYPERTENSION: ICD-10-CM

## 2018-09-05 LAB
CARTRIDGE LOT#: 890 NUMERIC
HEMOGLOBIN A1C: 7.9 % (ref 4.3–5.6)

## 2018-09-05 PROCEDURE — 90670 PCV13 VACCINE IM: CPT | Performed by: INTERNAL MEDICINE

## 2018-09-05 PROCEDURE — 99214 OFFICE O/P EST MOD 30 MIN: CPT | Performed by: INTERNAL MEDICINE

## 2018-09-05 PROCEDURE — 90471 IMMUNIZATION ADMIN: CPT | Performed by: INTERNAL MEDICINE

## 2018-09-05 PROCEDURE — 83036 HEMOGLOBIN GLYCOSYLATED A1C: CPT | Performed by: INTERNAL MEDICINE

## 2018-09-05 RX ORDER — AMLODIPINE BESYLATE 2.5 MG/1
2.5 TABLET ORAL DAILY
Qty: 90 TABLET | Refills: 1 | Status: SHIPPED | OUTPATIENT
Start: 2018-09-05 | End: 2019-06-10

## 2018-09-05 NOTE — TELEPHONE ENCOUNTER
Future Appointments  Date Time Provider Linda Maddox   10/26/2018 10:00 AM Binta Rothman MD [facility]   12/14/2018 8:15 AM Binta Rothman MD [facility]     Patient having annual physical.  Please place orders with E

## 2018-09-05 NOTE — PROGRESS NOTES
HPI:    Patient ID: Jon Hanna is a 80year old male.     HPI  Here for dm, due for prevnar 13, feeling well, eating much better and working on wt loss, htn, bp lower now with wt loss, lft's high but down a bit, us abd results reviewed  BP 90/58 (BP L Take two tablets by mouth today, then one tablet daily. Disp: 6 tablet Rfl: 0   Fluticasone Propionate 50 MCG/ACT Nasal Suspension 2 sprays by Each Nare route daily.  Disp: 1 Bottle Rfl: 0     Allergies:No Known Allergies   PHYSICAL EXAM:   Physical Exam

## 2018-09-15 ENCOUNTER — HOSPITAL ENCOUNTER (EMERGENCY)
Facility: HOSPITAL | Age: 68
Discharge: HOME | End: 2018-09-15
Attending: EMERGENCY MEDICINE
Payer: COMMERCIAL

## 2018-09-15 ENCOUNTER — APPOINTMENT (OUTPATIENT)
Dept: CT IMAGING | Facility: HOSPITAL | Age: 68
End: 2018-09-15
Attending: EMERGENCY MEDICINE
Payer: COMMERCIAL

## 2018-09-15 ENCOUNTER — TELEPHONE (OUTPATIENT)
Dept: INTERNAL MEDICINE CLINIC | Facility: CLINIC | Age: 68
End: 2018-09-15

## 2018-09-15 VITALS
HEIGHT: 75 IN | TEMPERATURE: 98 F | SYSTOLIC BLOOD PRESSURE: 129 MMHG | DIASTOLIC BLOOD PRESSURE: 73 MMHG | RESPIRATION RATE: 18 BRPM | BODY MASS INDEX: 24.25 KG/M2 | WEIGHT: 195 LBS | HEART RATE: 61 BPM | OXYGEN SATURATION: 97 %

## 2018-09-15 DIAGNOSIS — N20.0 KIDNEY STONE: Primary | ICD-10-CM

## 2018-09-15 LAB
ALBUMIN SERPL-MCNC: 3.9 G/DL (ref 3.5–4.8)
ALBUMIN/GLOB SERPL: 1 {RATIO} (ref 1–2)
ALP LIVER SERPL-CCNC: 66 U/L (ref 45–117)
ALT SERPL-CCNC: 56 U/L (ref 17–63)
ANION GAP SERPL CALC-SCNC: 9 MMOL/L (ref 0–18)
AST SERPL-CCNC: 24 U/L (ref 15–41)
BASOPHILS # BLD AUTO: 0.02 X10(3) UL (ref 0–0.1)
BASOPHILS NFR BLD AUTO: 0.3 %
BILIRUB SERPL-MCNC: 0.8 MG/DL (ref 0.1–2)
BUN BLD-MCNC: 18 MG/DL (ref 8–20)
BUN/CREAT SERPL: 15.3 (ref 10–20)
CALCIUM BLD-MCNC: 9.1 MG/DL (ref 8.3–10.3)
CHLORIDE SERPL-SCNC: 102 MMOL/L (ref 101–111)
CO2 SERPL-SCNC: 25 MMOL/L (ref 22–32)
CREAT BLD-MCNC: 1.18 MG/DL (ref 0.7–1.3)
EOSINOPHIL # BLD AUTO: 0.09 X10(3) UL (ref 0–0.3)
EOSINOPHIL NFR BLD AUTO: 1.5 %
ERYTHROCYTE [DISTWIDTH] IN BLOOD BY AUTOMATED COUNT: 11.6 % (ref 11.5–16)
GLOBULIN PLAS-MCNC: 3.9 G/DL (ref 2.5–4)
GLUCOSE BLD-MCNC: 207 MG/DL (ref 65–99)
GLUCOSE BLD-MCNC: 238 MG/DL (ref 70–99)
GLUCOSE UR STRIP.AUTO-MCNC: 500 MG/DL
HCT VFR BLD AUTO: 43.1 % (ref 37–53)
HGB BLD-MCNC: 15 G/DL (ref 13–17)
IMMATURE GRANULOCYTE COUNT: 0.02 X10(3) UL (ref 0–1)
IMMATURE GRANULOCYTE RATIO %: 0.3 %
KETONES UR STRIP.AUTO-MCNC: 15 MG/DL
LIPASE: 167 U/L (ref 73–393)
LYMPHOCYTES # BLD AUTO: 1.57 X10(3) UL (ref 0.9–4)
LYMPHOCYTES NFR BLD AUTO: 25.7 %
M PROTEIN MFR SERPL ELPH: 7.8 G/DL (ref 6.1–8.3)
MCH RBC QN AUTO: 34.6 PG (ref 27–33.2)
MCHC RBC AUTO-ENTMCNC: 34.8 G/DL (ref 31–37)
MCV RBC AUTO: 99.5 FL (ref 80–99)
MONOCYTES # BLD AUTO: 0.62 X10(3) UL (ref 0.1–1)
MONOCYTES NFR BLD AUTO: 10.2 %
NEUTROPHIL ABS PRELIM: 3.78 X10 (3) UL (ref 1.3–6.7)
NEUTROPHILS # BLD AUTO: 3.78 X10(3) UL (ref 1.3–6.7)
NEUTROPHILS NFR BLD AUTO: 62 %
NITRITE UR QL STRIP.AUTO: NEGATIVE
OSMOLALITY SERPL CALC.SUM OF ELEC: 292 MOSM/KG (ref 275–295)
PH UR STRIP.AUTO: 5.5 [PH] (ref 4.5–8)
PLATELET # BLD AUTO: 118 10(3)UL (ref 150–450)
POTASSIUM SERPL-SCNC: 5.1 MMOL/L (ref 3.6–5.1)
PROT UR STRIP.AUTO-MCNC: >=300 MG/DL
RBC # BLD AUTO: 4.33 X10(6)UL (ref 3.8–5.8)
RBC #/AREA URNS AUTO: >10 /HPF
RED CELL DISTRIBUTION WIDTH-SD: 42.4 FL (ref 35.1–46.3)
SODIUM SERPL-SCNC: 136 MMOL/L (ref 136–144)
SP GR UR STRIP.AUTO: 1.01 (ref 1–1.03)
UROBILINOGEN UR STRIP.AUTO-MCNC: 2 MG/DL
WBC # BLD AUTO: 6.1 X10(3) UL (ref 4–13)

## 2018-09-15 PROCEDURE — 87086 URINE CULTURE/COLONY COUNT: CPT | Performed by: EMERGENCY MEDICINE

## 2018-09-15 PROCEDURE — 81001 URINALYSIS AUTO W/SCOPE: CPT | Performed by: EMERGENCY MEDICINE

## 2018-09-15 PROCEDURE — 80053 COMPREHEN METABOLIC PANEL: CPT | Performed by: EMERGENCY MEDICINE

## 2018-09-15 PROCEDURE — 85025 COMPLETE CBC W/AUTO DIFF WBC: CPT | Performed by: EMERGENCY MEDICINE

## 2018-09-15 PROCEDURE — 96361 HYDRATE IV INFUSION ADD-ON: CPT

## 2018-09-15 PROCEDURE — 74176 CT ABD & PELVIS W/O CONTRAST: CPT | Performed by: EMERGENCY MEDICINE

## 2018-09-15 PROCEDURE — 83690 ASSAY OF LIPASE: CPT | Performed by: EMERGENCY MEDICINE

## 2018-09-15 PROCEDURE — 99285 EMERGENCY DEPT VISIT HI MDM: CPT

## 2018-09-15 PROCEDURE — 96375 TX/PRO/DX INJ NEW DRUG ADDON: CPT

## 2018-09-15 PROCEDURE — 82962 GLUCOSE BLOOD TEST: CPT

## 2018-09-15 PROCEDURE — 99284 EMERGENCY DEPT VISIT MOD MDM: CPT

## 2018-09-15 PROCEDURE — 96374 THER/PROPH/DIAG INJ IV PUSH: CPT

## 2018-09-15 RX ORDER — ONDANSETRON 2 MG/ML
4 INJECTION INTRAMUSCULAR; INTRAVENOUS ONCE
Status: COMPLETED | OUTPATIENT
Start: 2018-09-15 | End: 2018-09-15

## 2018-09-15 RX ORDER — SODIUM CHLORIDE 9 MG/ML
125 INJECTION, SOLUTION INTRAVENOUS CONTINUOUS
Status: DISCONTINUED | OUTPATIENT
Start: 2018-09-15 | End: 2018-09-15

## 2018-09-15 RX ORDER — TAMSULOSIN HYDROCHLORIDE 0.4 MG/1
0.4 CAPSULE ORAL DAILY
Qty: 7 CAPSULE | Refills: 0 | Status: SHIPPED | OUTPATIENT
Start: 2018-09-15 | End: 2018-09-22

## 2018-09-15 RX ORDER — MORPHINE SULFATE 4 MG/ML
4 INJECTION, SOLUTION INTRAMUSCULAR; INTRAVENOUS EVERY 30 MIN PRN
Status: DISCONTINUED | OUTPATIENT
Start: 2018-09-15 | End: 2018-09-15

## 2018-09-15 RX ORDER — HYDROCODONE BITARTRATE AND ACETAMINOPHEN 5; 325 MG/1; MG/1
1 TABLET ORAL EVERY 4 HOURS PRN
Qty: 20 TABLET | Refills: 0 | Status: SHIPPED | OUTPATIENT
Start: 2018-09-15 | End: 2018-10-26

## 2018-09-15 RX ORDER — ONDANSETRON 4 MG/1
4 TABLET, ORALLY DISINTEGRATING ORAL EVERY 4 HOURS PRN
Qty: 20 TABLET | Refills: 0 | Status: SHIPPED | OUTPATIENT
Start: 2018-09-15 | End: 2018-10-26

## 2018-09-15 NOTE — TELEPHONE ENCOUNTER
Wife called stating pt was up all night with stomach pains/abd pain-up high-had some diarrhea during the night-no fever-transferred call to SD to send to [facility]

## 2018-09-15 NOTE — ED INITIAL ASSESSMENT (HPI)
Pt here for right sided upper abdominal pain that started this am around 3. Pt states noted blood in his urine. Pt denies n,v,d or fever.

## 2018-09-15 NOTE — ED PROVIDER NOTES
Patient Seen in: [facility] Emergency Department    History   Patient presents with:  Abdomen/Flank Pain (GI/)    Stated Complaint: abd pain    HPI    This is a 72-year-old male who presents with right-sided abdominal pain starting about 3:00 last 1.8 oz      Types: 1 - 3 Standard drinks or equivalent per week      Comment: Cage done 6/14/2018    Drug use: No      Review of Systems    Positive for stated complaint: abd pain  Other systems are as noted in HPI.   Constitutional and vital signs reviewed (*)     Glucose Urine 500  (*)     Bilirubin Urine Large (*)     Ketones Urine 15  (*)     Blood Urine Large (*)     Protein Urine >=300 (*)     Leukocyte Esterase Urine Large (*)     RBC URINE >10 (*)     Bacteria Urine Rare (*)     All other components w Iv,no Oral)(lry=69513)    Result Date: 9/15/2018  PROCEDURE:  CT ABDOMEN/PELVIS KIDNEYSTONE 2D RNDR (NO IV,NO ORAL) (CPT=74176)  COMPARISON:  [place], CT ABDOMEN+PELVIS(ALL W+WO)(CPT=74178), 3/22/2017, 17:52.   [facility] , US ABDOMEN COMPLETE (CHV=07922), 8 chronic pancreatitis and measuring 4 mm in maximal diameter. No acute pancreatitis is noted. No focal pancreatic masses are appreciated SPLEEN:  No enlargement or focal lesion.   AORTA/VASCULAR:  Mild to moderate atherosclerotic calcifications of the aort pm    Follow-up:  Oneil Rodriguez MD  [address]          Shari Burks MD  [address] [facility] [address] [phone]              Medications Prescribed:  Discharge Medication List a

## 2018-09-17 ENCOUNTER — TELEPHONE (OUTPATIENT)
Dept: INTERNAL MEDICINE CLINIC | Facility: CLINIC | Age: 68
End: 2018-09-17

## 2018-09-17 DIAGNOSIS — R31.9 HEMATURIA, UNSPECIFIED TYPE: Primary | ICD-10-CM

## 2018-09-17 NOTE — TELEPHONE ENCOUNTER
Uro referral for Dr. Remington Brar pended for your approval.  Pt also notified of referral on file and provided contact information.

## 2018-09-17 NOTE — TELEPHONE ENCOUNTER
Called pt to follow up. Pt stated he has not taken any pain medication since 11pm last night and he has no pain at this time. He does report \"pink urine\" and that it no longer \"looks like blood more than urine\". He is asking how long his urine will be pink. He is slightly concerned that passing the stone will be somewhat painful and was also advised to see Urology after the stone passed. Ok to pend referral to Dr. Jonna Hinojosa? Pt was referred to Dr. Stella Lion in [place] but pt would prefer a provider in [facility]. Pt has seen Dr. Jonna Hinojosa in 5/2017 for hematuria, he had cystoscopy, results: \"PLAN: His cystoscopy was unremarkable today and his hematuria may have been related to his stone disease. He will follow up in 1 year. We will contact him with the results of his KUB. \"    Pt's preferred call back cell #: [phone] with JL on 10/26 - do you want to see him sooner to just refer to Uro? Thank you.

## 2018-09-17 NOTE — TELEPHONE ENCOUNTER
Pt went to the [facility] ER on Saturday for kidney stones was asked to stay to be admitted pt declined. Was also told its a 5ml stone. Pt still has the blood in the urine but stated its not as red its \"pink\" he stated he is feeling fine no other symptoms but would like to know how much longer will his urine look that way and will he know once his stone passes?  Please advise

## 2018-09-24 DIAGNOSIS — E78.00 PURE HYPERCHOLESTEROLEMIA: Primary | ICD-10-CM

## 2018-09-25 RX ORDER — ATORVASTATIN CALCIUM 20 MG/1
20 TABLET, FILM COATED ORAL NIGHTLY
Qty: 90 TABLET | Refills: 1 | Status: SHIPPED | OUTPATIENT
Start: 2018-09-25 | End: 2018-09-26

## 2018-09-25 RX ORDER — SIMVASTATIN 80 MG
TABLET ORAL
Qty: 90 TABLET | Refills: 0 | OUTPATIENT
Start: 2018-09-25

## 2018-09-25 NOTE — TELEPHONE ENCOUNTER
Call pt, stop simvastatin, start atorvastatin 20 mg po qd, better choice with other meds, recheck cmp and flp in feb, please order

## 2018-09-25 NOTE — TELEPHONE ENCOUNTER
Protocols passed but drug interactions is it okay to fill?   Please advise ,  LOV:9/5/18 JL  FOV:10/26/18  LAST RX:10/30/17 80 mg 1 tablet in the evening 90 tabs 1 refill   LAST LABS:09/15/18 poct glucose,urinalysis,urine culture,lipase,cbc,cmp  PER PROTOCO

## 2018-09-26 RX ORDER — ATORVASTATIN CALCIUM 20 MG/1
20 TABLET, FILM COATED ORAL NIGHTLY
Qty: 90 TABLET | Refills: 1 | Status: SHIPPED | OUTPATIENT
Start: 2018-09-26 | End: 2019-11-11

## 2018-09-26 NOTE — TELEPHONE ENCOUNTER
Called patient to inform patient pt understood the medication change  and stated that he has a physical in December and would possibly be having blood work done ,

## 2018-10-26 ENCOUNTER — OFFICE VISIT (OUTPATIENT)
Dept: ENDOCRINOLOGY CLINIC | Facility: CLINIC | Age: 68
End: 2018-10-26

## 2018-10-26 VITALS
HEART RATE: 60 BPM | WEIGHT: 196 LBS | DIASTOLIC BLOOD PRESSURE: 74 MMHG | SYSTOLIC BLOOD PRESSURE: 108 MMHG | BODY MASS INDEX: 24.37 KG/M2 | TEMPERATURE: 98 F | HEIGHT: 75 IN | RESPIRATION RATE: 20 BRPM

## 2018-10-26 DIAGNOSIS — E11.9 TYPE 2 DIABETES MELLITUS WITHOUT COMPLICATION, WITHOUT LONG-TERM CURRENT USE OF INSULIN (HCC): Primary | ICD-10-CM

## 2018-10-26 PROCEDURE — 90653 IIV ADJUVANT VACCINE IM: CPT | Performed by: INTERNAL MEDICINE

## 2018-10-26 PROCEDURE — 90471 IMMUNIZATION ADMIN: CPT | Performed by: INTERNAL MEDICINE

## 2018-10-26 PROCEDURE — 99212 OFFICE O/P EST SF 10 MIN: CPT | Performed by: INTERNAL MEDICINE

## 2018-10-26 NOTE — PROGRESS NOTES
HPI:    Patient ID: Gloria Robert is a 80year old male.     HPI  Here for dm, sugars fasting cont to improve with diet, no post prnadial readings  /74   Pulse 60   Temp 98.3 °F (36.8 °C) (Oral)   Resp 20   Ht 75\"   Wt 196 lb   BMI 24.50 kg/m² diet  Fu at pe    Orders Placed This Encounter      Fluad High Dose 72 ys and older 0.5 ml [20909]      Meds This Visit:  Requested Prescriptions      No prescriptions requested or ordered in this encounter       Imaging & Referrals:  FLU VACCINE ADJUVANT

## 2018-11-21 ENCOUNTER — TELEPHONE (OUTPATIENT)
Dept: INTERNAL MEDICINE CLINIC | Facility: CLINIC | Age: 68
End: 2018-11-21

## 2018-11-21 RX ORDER — AMOXICILLIN AND CLAVULANATE POTASSIUM 875; 125 MG/1; MG/1
1 TABLET, FILM COATED ORAL 2 TIMES DAILY
Qty: 20 TABLET | Refills: 0 | Status: SHIPPED | OUTPATIENT
Start: 2018-11-21 | End: 2018-12-01

## 2018-11-21 NOTE — TELEPHONE ENCOUNTER
Ran into pt at his work, he pulled me aside and states pnd, cough and clearing his throat for 7 weeks, a bit better recently but not gone, some sinus symptoms, plan augmentin for 10 days, then pt will call if not 100% better when done, pt is aware and agre

## 2018-11-30 ENCOUNTER — HOSPITAL ENCOUNTER (EMERGENCY)
Facility: HOSPITAL | Age: 68
Discharge: HOME | End: 2018-11-30
Attending: EMERGENCY MEDICINE
Payer: COMMERCIAL

## 2018-11-30 ENCOUNTER — TELEPHONE (OUTPATIENT)
Dept: INTERNAL MEDICINE CLINIC | Facility: CLINIC | Age: 68
End: 2018-11-30

## 2018-11-30 ENCOUNTER — APPOINTMENT (OUTPATIENT)
Dept: CT IMAGING | Facility: HOSPITAL | Age: 68
End: 2018-11-30
Attending: EMERGENCY MEDICINE
Payer: COMMERCIAL

## 2018-11-30 VITALS
TEMPERATURE: 98 F | HEART RATE: 65 BPM | RESPIRATION RATE: 18 BRPM | BODY MASS INDEX: 25 KG/M2 | SYSTOLIC BLOOD PRESSURE: 136 MMHG | WEIGHT: 197 LBS | OXYGEN SATURATION: 98 % | DIASTOLIC BLOOD PRESSURE: 78 MMHG

## 2018-11-30 DIAGNOSIS — N20.0 KIDNEY STONE ON LEFT SIDE: Primary | ICD-10-CM

## 2018-11-30 PROCEDURE — 85025 COMPLETE CBC W/AUTO DIFF WBC: CPT

## 2018-11-30 PROCEDURE — 99284 EMERGENCY DEPT VISIT MOD MDM: CPT

## 2018-11-30 PROCEDURE — 96361 HYDRATE IV INFUSION ADD-ON: CPT

## 2018-11-30 PROCEDURE — 87086 URINE CULTURE/COLONY COUNT: CPT | Performed by: EMERGENCY MEDICINE

## 2018-11-30 PROCEDURE — 96374 THER/PROPH/DIAG INJ IV PUSH: CPT

## 2018-11-30 PROCEDURE — 85025 COMPLETE CBC W/AUTO DIFF WBC: CPT | Performed by: EMERGENCY MEDICINE

## 2018-11-30 PROCEDURE — 80053 COMPREHEN METABOLIC PANEL: CPT

## 2018-11-30 PROCEDURE — 80053 COMPREHEN METABOLIC PANEL: CPT | Performed by: EMERGENCY MEDICINE

## 2018-11-30 PROCEDURE — 81001 URINALYSIS AUTO W/SCOPE: CPT | Performed by: EMERGENCY MEDICINE

## 2018-11-30 PROCEDURE — 96375 TX/PRO/DX INJ NEW DRUG ADDON: CPT

## 2018-11-30 PROCEDURE — 74176 CT ABD & PELVIS W/O CONTRAST: CPT | Performed by: EMERGENCY MEDICINE

## 2018-11-30 PROCEDURE — 83690 ASSAY OF LIPASE: CPT | Performed by: EMERGENCY MEDICINE

## 2018-11-30 RX ORDER — MORPHINE SULFATE 4 MG/ML
4 INJECTION, SOLUTION INTRAMUSCULAR; INTRAVENOUS EVERY 30 MIN PRN
Status: DISCONTINUED | OUTPATIENT
Start: 2018-11-30 | End: 2018-11-30

## 2018-11-30 RX ORDER — HYDROCODONE BITARTRATE AND ACETAMINOPHEN 5; 325 MG/1; MG/1
1-2 TABLET ORAL EVERY 4 HOURS PRN
Qty: 10 TABLET | Refills: 0 | Status: SHIPPED | OUTPATIENT
Start: 2018-11-30 | End: 2018-12-03

## 2018-11-30 RX ORDER — ONDANSETRON 2 MG/ML
4 INJECTION INTRAMUSCULAR; INTRAVENOUS ONCE
Status: COMPLETED | OUTPATIENT
Start: 2018-11-30 | End: 2018-11-30

## 2018-11-30 RX ORDER — ONDANSETRON 4 MG/1
4 TABLET, ORALLY DISINTEGRATING ORAL EVERY 4 HOURS PRN
Qty: 10 TABLET | Refills: 0 | Status: SHIPPED | OUTPATIENT
Start: 2018-11-30 | End: 2018-12-07

## 2018-11-30 RX ORDER — ONDANSETRON 2 MG/ML
INJECTION INTRAMUSCULAR; INTRAVENOUS
Status: COMPLETED
Start: 2018-11-30 | End: 2018-11-30

## 2018-11-30 RX ORDER — KETOROLAC TROMETHAMINE 30 MG/ML
30 INJECTION, SOLUTION INTRAMUSCULAR; INTRAVENOUS ONCE
Status: COMPLETED | OUTPATIENT
Start: 2018-11-30 | End: 2018-11-30

## 2018-11-30 NOTE — TELEPHONE ENCOUNTER
Pt's wife stated that pt is complaining of lower left back pain and found blood in urine. He has a history of kidney stones. Pt's wife would like to make sure it is nothing serious.        Scheduled to see JL on   Future Appointments   Date Time Provider Aleisha Goff

## 2018-11-30 NOTE — TELEPHONE ENCOUNTER
Called pt to triage. Pt reports low back/abd pain rating 8-9/10 which started 45 min ago. Denies fever/chills but states he is starting to feel the pain down in his testicles.   Pt has had kidney stones and this type of pain before in Sept.  I can hear in

## 2018-11-30 NOTE — ED INITIAL ASSESSMENT (HPI)
Left groin/ flank pain. Hematuria x4 days. Here 3months ago for same states he never passed the kidney stone.

## 2018-12-01 NOTE — ED PROVIDER NOTES
Patient Seen in: [facility] Emergency Department    History   Patient presents with:  Back Pain (musculoskeletal)  Urinary Symptoms (urologic)    Stated Complaint: Lt flank pain, Lt groin pain, Hx kidney stones    HPI    Pleasant 81-year-old male wh flank pain, Lt groin pain, Hx kidney stones  Other systems are as noted in HPI. Constitutional and vital signs reviewed. All other systems reviewed and negative except as noted above.     Physical Exam     ED Triage Vitals [11/30/18 1735]   /85 Notable for the following components:    Glucose 210 (*)     BUN 21 (*)     Creatinine 1.58 (*)     GFR, Non- 45 (*)     GFR, -American 52 (*)     AST 14 (*)     Total Protein 8.3 (*)     All other components within normal limits   C as obstruction, or inflammatory bowel changes. He received IV fluids. He received Zofran for nausea. The patient was sent for CAT scan to assess for kidney stones. There is a 4 mm mildly obstructing calculus proximal left ureter.   There are tiny bilate

## 2018-12-03 ENCOUNTER — OFFICE VISIT (OUTPATIENT)
Dept: INTERNAL MEDICINE CLINIC | Facility: CLINIC | Age: 68
End: 2018-12-03

## 2018-12-03 VITALS
TEMPERATURE: 98 F | BODY MASS INDEX: 25.24 KG/M2 | HEIGHT: 75 IN | WEIGHT: 203 LBS | DIASTOLIC BLOOD PRESSURE: 76 MMHG | HEART RATE: 59 BPM | SYSTOLIC BLOOD PRESSURE: 126 MMHG | RESPIRATION RATE: 20 BRPM

## 2018-12-03 DIAGNOSIS — R49.9 CHANGE IN VOICE: ICD-10-CM

## 2018-12-03 DIAGNOSIS — N20.0 NEPHROLITHIASIS: Primary | ICD-10-CM

## 2018-12-03 PROCEDURE — 99214 OFFICE O/P EST MOD 30 MIN: CPT | Performed by: INTERNAL MEDICINE

## 2018-12-03 RX ORDER — TAMSULOSIN HYDROCHLORIDE 0.4 MG/1
0.4 CAPSULE ORAL DAILY
Qty: 30 CAPSULE | Refills: 0 | Status: SHIPPED | OUTPATIENT
Start: 2018-12-03 | End: 2019-01-02

## 2018-12-03 RX ORDER — HYDROCODONE BITARTRATE AND ACETAMINOPHEN 5; 325 MG/1; MG/1
1-2 TABLET ORAL EVERY 6 HOURS PRN
Qty: 20 TABLET | Refills: 0 | Status: SHIPPED | OUTPATIENT
Start: 2018-12-03 | End: 2018-12-10

## 2018-12-03 NOTE — PROGRESS NOTES
HPI:    Patient ID: Alma Cedillo is a 80year old male.     HPI  Here for ER fu for left nephrolithiasis, still with intermittant pain, has not passed stone, htn, dm ok, discussed hydration  /76   Pulse 59   Temp 97.9 °F (36.6 °C) (Oral)   Resp 20 reactive to light. Cardiovascular: Normal rate, regular rhythm and normal heart sounds. No murmur heard. Pulmonary/Chest: Effort normal and breath sounds normal. He has no wheezes. Neurological: He is oriented to person, place, and time.    Skin: He

## 2018-12-10 ENCOUNTER — TELEPHONE (OUTPATIENT)
Dept: SURGERY | Facility: CLINIC | Age: 68
End: 2018-12-10

## 2018-12-10 NOTE — TELEPHONE ENCOUNTER
Pt called stating pt went to [facility] er on 11-30-18 for kidney stone. Pt was referred by pcp to see dr. Rajesh Jay. Pt has not passed the stone and is taking the pain medication. Pt requsting an appt asap. No appts available this week.   Call pt to advise

## 2018-12-12 NOTE — TELEPHONE ENCOUNTER
Can offer Monday 12/17. Dr. Queen Edwards will see patient's that day. Consult is 40 min. Please call patient and offer. Thanks.

## 2018-12-14 ENCOUNTER — OFFICE VISIT (OUTPATIENT)
Dept: INTERNAL MEDICINE CLINIC | Facility: CLINIC | Age: 68
End: 2018-12-14

## 2018-12-14 VITALS
TEMPERATURE: 99 F | SYSTOLIC BLOOD PRESSURE: 116 MMHG | HEART RATE: 66 BPM | WEIGHT: 198 LBS | HEIGHT: 73.5 IN | DIASTOLIC BLOOD PRESSURE: 72 MMHG | BODY MASS INDEX: 25.68 KG/M2 | RESPIRATION RATE: 20 BRPM

## 2018-12-14 DIAGNOSIS — E78.00 PURE HYPERCHOLESTEROLEMIA: Primary | ICD-10-CM

## 2018-12-14 DIAGNOSIS — I10 ESSENTIAL HYPERTENSION WITH GOAL BLOOD PRESSURE LESS THAN 130/80: ICD-10-CM

## 2018-12-14 DIAGNOSIS — E11.9 TYPE 2 DIABETES MELLITUS WITHOUT COMPLICATION, WITHOUT LONG-TERM CURRENT USE OF INSULIN (HCC): ICD-10-CM

## 2018-12-14 DIAGNOSIS — H61.23 IMPACTED CERUMEN OF BOTH EARS: ICD-10-CM

## 2018-12-14 DIAGNOSIS — Z00.00 ROUTINE GENERAL MEDICAL EXAMINATION AT A HEALTH CARE FACILITY: ICD-10-CM

## 2018-12-14 DIAGNOSIS — D69.6 THROMBOCYTOPENIA (HCC): ICD-10-CM

## 2018-12-14 PROCEDURE — 99212 OFFICE O/P EST SF 10 MIN: CPT | Performed by: INTERNAL MEDICINE

## 2018-12-14 PROCEDURE — 99397 PER PM REEVAL EST PAT 65+ YR: CPT | Performed by: INTERNAL MEDICINE

## 2018-12-14 NOTE — TELEPHONE ENCOUNTER
Called pt to offer appt with Dr. Anette Shultz on 12-17-18 at 1:20 pm.  Pt declined the appt stating pt passed the kidney stone 12-13-18 and appt is not needed.

## 2018-12-14 NOTE — PROGRESS NOTES
HPI:    Patient ID: Kristi Smith is a 80year old male.     HPI  Here for pe, recently passed kidney stones and plans fu with urology at [facility], now feels well, sugars ok, htn, hyperchol  /72   Pulse 66   Temp 98.5 °F (36.9 °C) (Oral)   Resp 20   Ht 7 No oropharyngeal exudate. Eyes: Pupils are equal, round, and reactive to light. Right eye exhibits no discharge. Neck: Neck supple. Cardiovascular: Normal rate, regular rhythm and normal heart sounds. No murmur heard.   Pulmonary/Chest: Effort charles

## 2018-12-26 ENCOUNTER — MED REC SCAN ONLY (OUTPATIENT)
Dept: INTERNAL MEDICINE CLINIC | Facility: CLINIC | Age: 68
End: 2018-12-26

## 2019-01-30 ENCOUNTER — HOSPITAL ENCOUNTER (OUTPATIENT)
Dept: CT IMAGING | Facility: HOSPITAL | Age: 69
Discharge: HOME | End: 2019-01-30
Attending: OTOLARYNGOLOGY
Payer: COMMERCIAL

## 2019-01-30 DIAGNOSIS — J32.4 CHRONIC PANSINUSITIS: ICD-10-CM

## 2019-01-30 PROCEDURE — 70486 CT MAXILLOFACIAL W/O DYE: CPT | Performed by: OTOLARYNGOLOGY

## 2019-01-30 RX ORDER — ATENOLOL 50 MG/1
TABLET ORAL
Qty: 90 TABLET | Refills: 1 | Status: SHIPPED | OUTPATIENT
Start: 2019-01-30 | End: 2019-08-06

## 2019-01-30 RX ORDER — LOSARTAN POTASSIUM 100 MG/1
TABLET ORAL
Qty: 90 TABLET | Refills: 1 | Status: SHIPPED | OUTPATIENT
Start: 2019-01-30 | End: 2019-08-06

## 2019-01-30 RX ORDER — GLIPIZIDE 5 MG/1
TABLET, EXTENDED RELEASE ORAL
Qty: 90 TABLET | Refills: 1 | Status: SHIPPED | OUTPATIENT
Start: 2019-01-30 | End: 2019-08-06

## 2019-01-30 RX ORDER — METFORMIN HYDROCHLORIDE 500 MG/1
TABLET, EXTENDED RELEASE ORAL
Qty: 270 TABLET | Refills: 1 | Status: SHIPPED | OUTPATIENT
Start: 2019-01-30 | End: 2019-08-09

## 2019-01-30 NOTE — TELEPHONE ENCOUNTER
LOV-12/14/18 BETINA  FOV-none  LAST RX-12/14/18 BETINA  LAST LABS-9/5/18 a1c-7.9  PER PROTOCOL-to provider

## 2019-02-19 ENCOUNTER — TELEPHONE (OUTPATIENT)
Dept: INTERNAL MEDICINE CLINIC | Facility: CLINIC | Age: 69
End: 2019-02-19

## 2019-02-19 DIAGNOSIS — K76.0 HEPATIC STEATOSIS: Primary | ICD-10-CM

## 2019-02-19 DIAGNOSIS — K76.0 FATTY LIVER: ICD-10-CM

## 2019-02-19 DIAGNOSIS — K22.719 BARRETT'S ESOPHAGUS WITH DYSPLASIA: ICD-10-CM

## 2019-02-19 NOTE — TELEPHONE ENCOUNTER
Received faxed referral request for Dr. Sherice Singh ([address] [place] [address]), f/u appt on 2/26/19. CPT code: 36828  ICD-10 codes: K76.0, K22.70    JL - Referral pended for your approval, thank you.   LOV: 12/14/18

## 2019-03-05 ENCOUNTER — LAB ENCOUNTER (OUTPATIENT)
Dept: LAB | Age: 69
End: 2019-03-05
Attending: INTERNAL MEDICINE
Payer: COMMERCIAL

## 2019-03-05 DIAGNOSIS — E11.9 TYPE 2 DIABETES MELLITUS WITHOUT COMPLICATION, WITHOUT LONG-TERM CURRENT USE OF INSULIN (HCC): ICD-10-CM

## 2019-03-05 LAB
ALBUMIN SERPL-MCNC: 3.9 G/DL (ref 3.4–5)
ALBUMIN/GLOB SERPL: 1.1 {RATIO} (ref 1–2)
ALP LIVER SERPL-CCNC: 47 U/L (ref 45–117)
ALT SERPL-CCNC: 48 U/L (ref 16–61)
ANION GAP SERPL CALC-SCNC: 8 MMOL/L (ref 0–18)
AST SERPL-CCNC: 26 U/L (ref 15–37)
BASOPHILS # BLD AUTO: 0.01 X10(3) UL (ref 0–0.2)
BASOPHILS NFR BLD AUTO: 0.2 %
BILIRUB SERPL-MCNC: 0.8 MG/DL (ref 0.1–2)
BUN BLD-MCNC: 14 MG/DL (ref 7–18)
BUN/CREAT SERPL: 15.6 (ref 10–20)
CALCIUM BLD-MCNC: 8.9 MG/DL (ref 8.5–10.1)
CHLORIDE SERPL-SCNC: 106 MMOL/L (ref 98–107)
CHOLEST SMN-MCNC: 158 MG/DL (ref ?–200)
CO2 SERPL-SCNC: 27 MMOL/L (ref 21–32)
CREAT BLD-MCNC: 0.9 MG/DL (ref 0.7–1.3)
CREAT UR-SCNC: 210 MG/DL
DEPRECATED RDW RBC AUTO: 46.4 FL (ref 35.1–46.3)
EOSINOPHIL # BLD AUTO: 0.15 X10(3) UL (ref 0–0.7)
EOSINOPHIL NFR BLD AUTO: 3.2 %
ERYTHROCYTE [DISTWIDTH] IN BLOOD BY AUTOMATED COUNT: 12.1 % (ref 11–15)
GLOBULIN PLAS-MCNC: 3.5 G/DL (ref 2.8–4.4)
GLUCOSE BLD-MCNC: 142 MG/DL (ref 70–99)
HCT VFR BLD AUTO: 41.4 % (ref 39–53)
HDLC SERPL-MCNC: 53 MG/DL (ref 40–59)
HGB BLD-MCNC: 14.2 G/DL (ref 13–17.5)
IMM GRANULOCYTES # BLD AUTO: 0.01 X10(3) UL (ref 0–1)
IMM GRANULOCYTES NFR BLD: 0.2 %
LDLC SERPL CALC-MCNC: 73 MG/DL (ref ?–100)
LYMPHOCYTES # BLD AUTO: 2.17 X10(3) UL (ref 1–4)
LYMPHOCYTES NFR BLD AUTO: 46.3 %
M PROTEIN MFR SERPL ELPH: 7.4 G/DL (ref 6.4–8.2)
MCH RBC QN AUTO: 35.6 PG (ref 26–34)
MCHC RBC AUTO-ENTMCNC: 34.3 G/DL (ref 31–37)
MCV RBC AUTO: 103.8 FL (ref 80–100)
MICROALBUMIN UR-MCNC: 1.94 MG/DL
MICROALBUMIN/CREAT 24H UR-RTO: 9.2 UG/MG (ref ?–30)
MONOCYTES # BLD AUTO: 0.49 X10(3) UL (ref 0.1–1)
MONOCYTES NFR BLD AUTO: 10.4 %
NEUTROPHILS # BLD AUTO: 1.86 X10 (3) UL (ref 1.5–7.7)
NEUTROPHILS # BLD AUTO: 1.86 X10(3) UL (ref 1.5–7.7)
NEUTROPHILS NFR BLD AUTO: 39.7 %
NONHDLC SERPL-MCNC: 105 MG/DL (ref ?–130)
OSMOLALITY SERPL CALC.SUM OF ELEC: 295 MOSM/KG (ref 275–295)
PLATELET # BLD AUTO: 120 10(3)UL (ref 150–450)
POTASSIUM SERPL-SCNC: 4.3 MMOL/L (ref 3.5–5.1)
RBC # BLD AUTO: 3.99 X10(6)UL (ref 3.8–5.8)
SODIUM SERPL-SCNC: 141 MMOL/L (ref 136–145)
TRIGL SERPL-MCNC: 162 MG/DL (ref 30–149)
VLDLC SERPL CALC-MCNC: 32 MG/DL (ref 0–30)
WBC # BLD AUTO: 4.7 X10(3) UL (ref 4–11)

## 2019-03-05 PROCEDURE — 82043 UR ALBUMIN QUANTITATIVE: CPT

## 2019-03-05 PROCEDURE — 80053 COMPREHEN METABOLIC PANEL: CPT

## 2019-03-05 PROCEDURE — 82570 ASSAY OF URINE CREATININE: CPT

## 2019-03-05 PROCEDURE — 85025 COMPLETE CBC W/AUTO DIFF WBC: CPT

## 2019-03-05 PROCEDURE — 80061 LIPID PANEL: CPT

## 2019-03-06 ENCOUNTER — OFFICE VISIT (OUTPATIENT)
Dept: ENDOCRINOLOGY CLINIC | Facility: CLINIC | Age: 69
End: 2019-03-06

## 2019-03-06 VITALS
TEMPERATURE: 98 F | HEART RATE: 66 BPM | DIASTOLIC BLOOD PRESSURE: 74 MMHG | SYSTOLIC BLOOD PRESSURE: 130 MMHG | WEIGHT: 202 LBS | RESPIRATION RATE: 20 BRPM | HEIGHT: 74 IN | BODY MASS INDEX: 25.93 KG/M2

## 2019-03-06 DIAGNOSIS — I10 ESSENTIAL HYPERTENSION WITH GOAL BLOOD PRESSURE LESS THAN 130/80: ICD-10-CM

## 2019-03-06 DIAGNOSIS — E11.9 TYPE 2 DIABETES MELLITUS WITHOUT COMPLICATION, WITHOUT LONG-TERM CURRENT USE OF INSULIN (HCC): ICD-10-CM

## 2019-03-06 DIAGNOSIS — E78.00 PURE HYPERCHOLESTEROLEMIA: Primary | ICD-10-CM

## 2019-03-06 LAB
CARTRIDGE LOT#: 980 NUMERIC
HEMOGLOBIN A1C: 6 % (ref 4.3–5.6)

## 2019-03-06 PROCEDURE — 83036 HEMOGLOBIN GLYCOSYLATED A1C: CPT | Performed by: INTERNAL MEDICINE

## 2019-03-06 PROCEDURE — 99213 OFFICE O/P EST LOW 20 MIN: CPT | Performed by: INTERNAL MEDICINE

## 2019-03-06 NOTE — PROGRESS NOTES
HPI:    Patient ID: Keyonna Tovar is a 80year old male.     HPI  Here for dm, htn, hyperchol, states sugars better, no lows  /74   Pulse 66   Temp 97.7 °F (36.5 °C) (Oral)   Resp 20   Ht 74\"   Wt 202 lb   BMI 25.94 kg/m²     Review of Systems   C Effort normal and breath sounds normal. He has no wheezes. Musculoskeletal: He exhibits no edema. Neurological: He is oriented to person, place, and time. Bilateral lower extremities visually inspected.  Bilateral foot monofilament skin exam done, and

## 2019-03-11 ENCOUNTER — OFFICE VISIT (OUTPATIENT)
Dept: INTERNAL MEDICINE CLINIC | Facility: CLINIC | Age: 69
End: 2019-03-11

## 2019-03-11 VITALS
RESPIRATION RATE: 18 BRPM | WEIGHT: 204.19 LBS | HEIGHT: 74 IN | SYSTOLIC BLOOD PRESSURE: 144 MMHG | BODY MASS INDEX: 26.21 KG/M2 | DIASTOLIC BLOOD PRESSURE: 82 MMHG | HEART RATE: 68 BPM

## 2019-03-11 DIAGNOSIS — Z01.818 PRE-OP EVALUATION: Primary | ICD-10-CM

## 2019-03-11 PROCEDURE — 93000 ELECTROCARDIOGRAM COMPLETE: CPT | Performed by: INTERNAL MEDICINE

## 2019-03-11 NOTE — H&P
[address] Patient Status:  Hospital Outpatient Surgery    1950 MRN [id number]   [facility] SURGERY Attending Krzysztof Mccullough MD   Hosp Day # 0 PCP Carey Jones MD     History of Pre left, erythema, purulence and turbinate swelling. Neck: No tenderness to palpitation. Full range of motion to flexion and extension, lateral rotation and lateral flexion of cervical spine. No JVD. Supple. Lungs: Clear to auscultation bilaterally.   Car

## 2019-03-13 ENCOUNTER — ANESTHESIA EVENT (OUTPATIENT)
Dept: SURGERY | Facility: HOSPITAL | Age: 69
End: 2019-03-13

## 2019-03-13 NOTE — TELEPHONE ENCOUNTER
Per protocol; failed - route to provider  Patient is due for repeat a1c, last not at goal No. YUSEF screening performed.  STOP BANG Legend: 0-2 = LOW Risk; 3-4 = INTERMEDIATE Risk; 5-8 = HIGH Risk

## 2019-03-14 ENCOUNTER — HOSPITAL ENCOUNTER (OUTPATIENT)
Facility: HOSPITAL | Age: 69
Setting detail: HOSPITAL OUTPATIENT SURGERY
Discharge: HOME | End: 2019-03-14
Attending: OTOLARYNGOLOGY | Admitting: OTOLARYNGOLOGY
Payer: COMMERCIAL

## 2019-03-14 ENCOUNTER — ANESTHESIA (OUTPATIENT)
Dept: SURGERY | Facility: HOSPITAL | Age: 69
End: 2019-03-14

## 2019-03-14 VITALS
WEIGHT: 199.31 LBS | RESPIRATION RATE: 12 BRPM | OXYGEN SATURATION: 99 % | BODY MASS INDEX: 24.78 KG/M2 | TEMPERATURE: 97 F | HEIGHT: 75 IN | SYSTOLIC BLOOD PRESSURE: 137 MMHG | DIASTOLIC BLOOD PRESSURE: 83 MMHG | HEART RATE: 65 BPM

## 2019-03-14 PROCEDURE — 8E09XBZ COMPUTER ASSISTED PROCEDURE OF HEAD AND NECK REGION: ICD-10-PCS | Performed by: OTOLARYNGOLOGY

## 2019-03-14 PROCEDURE — 099R8ZZ DRAINAGE OF LEFT MAXILLARY SINUS, VIA NATURAL OR ARTIFICIAL OPENING ENDOSCOPIC: ICD-10-PCS | Performed by: OTOLARYNGOLOGY

## 2019-03-14 PROCEDURE — 88312 SPECIAL STAINS GROUP 1: CPT | Performed by: OTOLARYNGOLOGY

## 2019-03-14 PROCEDURE — 88311 DECALCIFY TISSUE: CPT | Performed by: OTOLARYNGOLOGY

## 2019-03-14 PROCEDURE — 099Q8ZZ DRAINAGE OF RIGHT MAXILLARY SINUS, VIA NATURAL OR ARTIFICIAL OPENING ENDOSCOPIC: ICD-10-PCS | Performed by: OTOLARYNGOLOGY

## 2019-03-14 PROCEDURE — 09DV4ZZ EXTRACTION OF LEFT ETHMOID SINUS, PERCUTANEOUS ENDOSCOPIC APPROACH: ICD-10-PCS | Performed by: OTOLARYNGOLOGY

## 2019-03-14 PROCEDURE — 82962 GLUCOSE BLOOD TEST: CPT

## 2019-03-14 PROCEDURE — 88304 TISSUE EXAM BY PATHOLOGIST: CPT | Performed by: OTOLARYNGOLOGY

## 2019-03-14 PROCEDURE — 09CX8ZZ EXTIRPATION OF MATTER FROM LEFT SPHENOID SINUS, VIA NATURAL OR ARTIFICIAL OPENING ENDOSCOPIC: ICD-10-PCS | Performed by: OTOLARYNGOLOGY

## 2019-03-14 PROCEDURE — 095L8ZZ DESTRUCTION OF NASAL TURBINATE, VIA NATURAL OR ARTIFICIAL OPENING ENDOSCOPIC: ICD-10-PCS | Performed by: OTOLARYNGOLOGY

## 2019-03-14 PROCEDURE — 09DU4ZZ EXTRACTION OF RIGHT ETHMOID SINUS, PERCUTANEOUS ENDOSCOPIC APPROACH: ICD-10-PCS | Performed by: OTOLARYNGOLOGY

## 2019-03-14 RX ORDER — HYDROCODONE BITARTRATE AND ACETAMINOPHEN 5; 325 MG/1; MG/1
2 TABLET ORAL AS NEEDED
Status: COMPLETED | OUTPATIENT
Start: 2019-03-14 | End: 2019-03-14

## 2019-03-14 RX ORDER — HYDROMORPHONE HYDROCHLORIDE 1 MG/ML
0.4 INJECTION, SOLUTION INTRAMUSCULAR; INTRAVENOUS; SUBCUTANEOUS EVERY 5 MIN PRN
Status: DISCONTINUED | OUTPATIENT
Start: 2019-03-14 | End: 2019-03-14

## 2019-03-14 RX ORDER — HYDROCODONE BITARTRATE AND ACETAMINOPHEN 5; 325 MG/1; MG/1
1 TABLET ORAL AS NEEDED
Status: COMPLETED | OUTPATIENT
Start: 2019-03-14 | End: 2019-03-14

## 2019-03-14 RX ORDER — HYDROCODONE BITARTRATE AND ACETAMINOPHEN 10; 325 MG/1; MG/1
1 TABLET ORAL EVERY 4 HOURS PRN
Qty: 15 TABLET | Refills: 0 | Status: SHIPPED | OUTPATIENT
Start: 2019-03-14 | End: 2019-03-24

## 2019-03-14 RX ORDER — ONDANSETRON 2 MG/ML
4 INJECTION INTRAMUSCULAR; INTRAVENOUS AS NEEDED
Status: DISCONTINUED | OUTPATIENT
Start: 2019-03-14 | End: 2019-03-14

## 2019-03-14 RX ORDER — INSULIN ASPART 100 [IU]/ML
INJECTION, SOLUTION INTRAVENOUS; SUBCUTANEOUS
Status: DISCONTINUED
Start: 2019-03-14 | End: 2019-03-14 | Stop reason: WASHOUT

## 2019-03-14 RX ORDER — MEPERIDINE HYDROCHLORIDE 25 MG/ML
12.5 INJECTION INTRAMUSCULAR; INTRAVENOUS; SUBCUTANEOUS AS NEEDED
Status: DISCONTINUED | OUTPATIENT
Start: 2019-03-14 | End: 2019-03-14

## 2019-03-14 RX ORDER — ACETAMINOPHEN 500 MG
1000 TABLET ORAL ONCE
Status: DISCONTINUED | OUTPATIENT
Start: 2019-03-14 | End: 2019-03-14

## 2019-03-14 RX ORDER — LIDOCAINE HYDROCHLORIDE AND EPINEPHRINE 10; 10 MG/ML; UG/ML
INJECTION, SOLUTION INFILTRATION; PERINEURAL AS NEEDED
Status: DISCONTINUED | OUTPATIENT
Start: 2019-03-14 | End: 2019-03-14 | Stop reason: HOSPADM

## 2019-03-14 RX ORDER — INSULIN ASPART 100 [IU]/ML
INJECTION, SOLUTION INTRAVENOUS; SUBCUTANEOUS ONCE
Status: COMPLETED | OUTPATIENT
Start: 2019-03-14 | End: 2019-03-14

## 2019-03-14 RX ORDER — LABETALOL HYDROCHLORIDE 5 MG/ML
5 INJECTION, SOLUTION INTRAVENOUS EVERY 5 MIN PRN
Status: DISCONTINUED | OUTPATIENT
Start: 2019-03-14 | End: 2019-03-14

## 2019-03-14 RX ORDER — SODIUM CHLORIDE, SODIUM LACTATE, POTASSIUM CHLORIDE, CALCIUM CHLORIDE 600; 310; 30; 20 MG/100ML; MG/100ML; MG/100ML; MG/100ML
INJECTION, SOLUTION INTRAVENOUS CONTINUOUS
Status: DISCONTINUED | OUTPATIENT
Start: 2019-03-14 | End: 2019-03-14

## 2019-03-14 RX ORDER — NALOXONE HYDROCHLORIDE 0.4 MG/ML
80 INJECTION, SOLUTION INTRAMUSCULAR; INTRAVENOUS; SUBCUTANEOUS AS NEEDED
Status: DISCONTINUED | OUTPATIENT
Start: 2019-03-14 | End: 2019-03-14

## 2019-03-14 RX ORDER — DIPHENHYDRAMINE HYDROCHLORIDE 50 MG/ML
12.5 INJECTION INTRAMUSCULAR; INTRAVENOUS AS NEEDED
Status: DISCONTINUED | OUTPATIENT
Start: 2019-03-14 | End: 2019-03-14

## 2019-03-14 RX ORDER — AMOXICILLIN AND CLAVULANATE POTASSIUM 875; 125 MG/1; MG/1
1 TABLET, FILM COATED ORAL 2 TIMES DAILY
Qty: 28 TABLET | Refills: 0 | Status: SHIPPED | OUTPATIENT
Start: 2019-03-14 | End: 2019-03-28

## 2019-03-14 RX ORDER — ACETAMINOPHEN 500 MG
1000 TABLET ORAL EVERY 6 HOURS PRN
COMMUNITY
End: 2020-09-04

## 2019-03-14 RX ORDER — DEXTROSE MONOHYDRATE 25 G/50ML
50 INJECTION, SOLUTION INTRAVENOUS
Status: DISCONTINUED | OUTPATIENT
Start: 2019-03-14 | End: 2019-03-14

## 2019-03-14 RX ORDER — CEFAZOLIN SODIUM/WATER 2 G/20 ML
2 SYRINGE (ML) INTRAVENOUS ONCE
Status: COMPLETED | OUTPATIENT
Start: 2019-03-14 | End: 2019-03-14

## 2019-03-14 RX ORDER — DEXTROSE MONOHYDRATE 25 G/50ML
50 INJECTION, SOLUTION INTRAVENOUS
Status: DISCONTINUED | OUTPATIENT
Start: 2019-03-14 | End: 2019-03-14 | Stop reason: HOSPADM

## 2019-03-14 NOTE — ANESTHESIA PREPROCEDURE EVALUATION
PRE-OP EVALUATION    Patient Name: Dago Hahn    Pre-op Diagnosis: CHRONIC RECURRENT SINUSITIS, DEVIATED NASAL SEPTUM    Procedure(s):  BILATERAL ETHMOIDECTOMY/SPHENOIDOTOMY WITH TISSUE REMOVAL,   MAXILLARY ANTROSTOMY WITH TISSUE REMOVAL, OUT FRACTUR Oral Tab Take 1 tablet (20 mg total) by mouth nightly. Disp: 90 tablet Rfl: 1   AmLODIPine Besylate 2.5 MG Oral Tab Take 1 tablet (2.5 mg total) by mouth daily. Disp: 90 tablet Rfl: 1   Multiple Vitamins-Minerals (MULTI FOR HIM 50+ OR) Take by mouth daily. 03/05/2019    .0 (L) 03/05/2019     Lab Results   Component Value Date     03/05/2019    K 4.3 03/05/2019     03/05/2019    CO2 27.0 03/05/2019    BUN 14 03/05/2019    CREATSERUM 0.90 03/05/2019     (H) 03/05/2019    CA 8.9 03/05/

## 2019-03-14 NOTE — BRIEF OP NOTE
Pre-Operative Diagnosis: CHRONIC RECURRENT SINUSITIS, DEVIATED NASAL SEPTUM     Post-Operative Diagnosis: CHRONIC RECURRENT SINUSITIS, DEVIATED NASAL SEPTUM      Procedure Performed:   Procedure(s):  BILATERAL ETHMOIDECTOMY, LEFT SPHENOIDOTOMY,   MAXILLARY

## 2019-03-14 NOTE — OPERATIVE REPORT
[facility]    PATIENT'S NAME: Cindi Jose   ATTENDING PHYSICIAN: Ryan Jane M.D. OPERATING PHYSICIAN: Ryan Jane M.D.    PATIENT ACCOUNT#:   [de-identified]    LOCATION:  PACU [facility] PACU 5 EDWP 10  MEDICAL RECORD #:   [id number]       DATE OF cells taking down ethmoidal air cells as I went. The [employer] Guidance System was important during this part of the case to identify landmarks, including the lamina and the fovea. The patient had polypoid changes throughout the ethmoids.     Attention wa

## 2019-03-14 NOTE — ANESTHESIA POSTPROCEDURE EVALUATION
[id number] [address] [place] [address] Patient Status:  Hospital Outpatient Surgery   Age/Gender 80year old male MRN [id number]   [facility] SURGERY Attending Mariaelena Morse MD   Hosp Day # 0 PCP Durand Sandifer, MD       Anesthesia Post-op

## 2019-03-14 NOTE — INTERVAL H&P NOTE
Pre-op Diagnosis: CHRONIC RECURRENT SINUSITIS, DEVIATED NASAL SEPTUM    The above referenced H&P was reviewed by Pricilla Seals MD on 3/14/2019, the patient was examined and no significant changes have occurred in the patient's condition since the H&P

## 2019-03-15 NOTE — PROGRESS NOTES
HPI:    Patient ID: Dago Hahn is a 80year old male.     HPI  Pt states only needs ekg, not exam for or  Review of Systems         Current Outpatient Medications:  raNITIdine HCl 300 MG Oral Tab TAKE ONE TABLET BY MOUTH AT BEDTIME Disp: 90 tablet Rfl

## 2019-04-05 PROBLEM — K21.9 GASTROESOPHAGEAL REFLUX DISEASE WITHOUT ESOPHAGITIS: Status: ACTIVE | Noted: 2019-04-05

## 2019-04-05 PROBLEM — K22.70 BARRETT'S ESOPHAGUS WITHOUT DYSPLASIA: Status: ACTIVE | Noted: 2019-04-05

## 2019-04-05 PROCEDURE — 88305 TISSUE EXAM BY PATHOLOGIST: CPT | Performed by: STUDENT IN AN ORGANIZED HEALTH CARE EDUCATION/TRAINING PROGRAM

## 2019-04-22 ENCOUNTER — MED REC SCAN ONLY (OUTPATIENT)
Dept: INTERNAL MEDICINE CLINIC | Facility: CLINIC | Age: 69
End: 2019-04-22

## 2019-06-10 RX ORDER — AMLODIPINE BESYLATE 2.5 MG/1
TABLET ORAL
Qty: 90 TABLET | Refills: 0 | Status: SHIPPED | OUTPATIENT
Start: 2019-06-10 | End: 2019-09-15

## 2019-06-11 ENCOUNTER — TELEPHONE (OUTPATIENT)
Dept: INTERNAL MEDICINE CLINIC | Facility: CLINIC | Age: 69
End: 2019-06-11

## 2019-06-11 NOTE — TELEPHONE ENCOUNTER
Pt is to f/u in Aug per JL last note (HTN, DM). Nothing scheduled. Looks like pt sees JL primarily. Please call and help him schedule with JL if that is who he desires to see.   Thx.

## 2019-06-12 ENCOUNTER — MED REC SCAN ONLY (OUTPATIENT)
Dept: INTERNAL MEDICINE CLINIC | Facility: CLINIC | Age: 69
End: 2019-06-12

## 2019-06-13 NOTE — TELEPHONE ENCOUNTER
Future Appointments   Date Time Provider Linda Maddox   8/9/2019  9:30 AM Diane Wolf MD [facility]

## 2019-06-20 ENCOUNTER — TELEPHONE (OUTPATIENT)
Dept: INTERNAL MEDICINE CLINIC | Facility: CLINIC | Age: 69
End: 2019-06-20

## 2019-06-20 DIAGNOSIS — R05.9 COUGH: Primary | ICD-10-CM

## 2019-07-02 ENCOUNTER — MED REC SCAN ONLY (OUTPATIENT)
Dept: INTERNAL MEDICINE CLINIC | Facility: CLINIC | Age: 69
End: 2019-07-02

## 2019-08-06 RX ORDER — LOSARTAN POTASSIUM 100 MG/1
TABLET ORAL
Qty: 90 TABLET | Refills: 0 | Status: SHIPPED | OUTPATIENT
Start: 2019-08-06 | End: 2019-11-18

## 2019-08-06 RX ORDER — ATENOLOL 50 MG/1
TABLET ORAL
Qty: 90 TABLET | Refills: 0 | Status: SHIPPED | OUTPATIENT
Start: 2019-08-06 | End: 2019-11-11

## 2019-08-06 RX ORDER — GLIPIZIDE 5 MG/1
TABLET, EXTENDED RELEASE ORAL
Qty: 90 TABLET | Refills: 0 | Status: SHIPPED | OUTPATIENT
Start: 2019-08-06 | End: 2019-11-11

## 2019-08-09 ENCOUNTER — OFFICE VISIT (OUTPATIENT)
Dept: ENDOCRINOLOGY CLINIC | Facility: CLINIC | Age: 69
End: 2019-08-09

## 2019-08-09 VITALS
BODY MASS INDEX: 31.37 KG/M2 | WEIGHT: 207 LBS | RESPIRATION RATE: 14 BRPM | HEIGHT: 68 IN | SYSTOLIC BLOOD PRESSURE: 132 MMHG | HEART RATE: 78 BPM | DIASTOLIC BLOOD PRESSURE: 84 MMHG

## 2019-08-09 DIAGNOSIS — E11.9 TYPE 2 DIABETES MELLITUS WITHOUT COMPLICATION, WITHOUT LONG-TERM CURRENT USE OF INSULIN (HCC): ICD-10-CM

## 2019-08-09 DIAGNOSIS — Z12.11 SPECIAL SCREENING FOR MALIGNANT NEOPLASMS, COLON: Primary | ICD-10-CM

## 2019-08-09 DIAGNOSIS — J30.9 ALLERGIC RHINITIS, UNSPECIFIED SEASONALITY, UNSPECIFIED TRIGGER: ICD-10-CM

## 2019-08-09 LAB
CARTRIDGE LOT#: 540 NUMERIC
HEMOGLOBIN A1C: 7.7 % (ref 4.3–5.6)

## 2019-08-09 PROCEDURE — 99213 OFFICE O/P EST LOW 20 MIN: CPT | Performed by: INTERNAL MEDICINE

## 2019-08-09 PROCEDURE — 83036 HEMOGLOBIN GLYCOSYLATED A1C: CPT | Performed by: INTERNAL MEDICINE

## 2019-08-09 RX ORDER — METFORMIN HYDROCHLORIDE 500 MG/1
TABLET, EXTENDED RELEASE ORAL
Qty: 360 TABLET | Refills: 1 | Status: SHIPPED | OUTPATIENT
Start: 2019-08-09 | End: 2020-03-12

## 2019-08-09 NOTE — PROGRESS NOTES
HPI:    Patient ID: Yumi Menon is a 77year old male.     HPI  Here for dm, states diet not as good, still with chronic allergic rhititis, has seen allergy, htn, hyperchol, otherwise feels well  /84   Pulse 78   Resp 14   Ht 68\"   Wt 207 lb   B and breath sounds normal. He has no wheezes. Neurological: He is oriented to person, place, and time. Skin: He is not diaphoretic.               ASSESSMENT/PLAN:   Special screening for malignant neoplasms, colon  (primary encounter diagnosis)-home with

## 2019-08-21 ENCOUNTER — PATIENT OUTREACH (OUTPATIENT)
Dept: INTERNAL MEDICINE CLINIC | Facility: CLINIC | Age: 69
End: 2019-08-21

## 2019-08-21 NOTE — PROGRESS NOTES
Spoke with patient at [phone] asking if he is ready to schedule a colonoscopy. Patient states that he will be sending in the patient screening kit soon. Advised to call us back if any questions or concerns.

## 2019-08-29 ENCOUNTER — APPOINTMENT (OUTPATIENT)
Dept: LAB | Facility: HOSPITAL | Age: 69
End: 2019-08-29
Attending: INTERNAL MEDICINE
Payer: COMMERCIAL

## 2019-08-29 DIAGNOSIS — Z12.11 SPECIAL SCREENING FOR MALIGNANT NEOPLASMS, COLON: ICD-10-CM

## 2019-08-29 PROCEDURE — 82274 ASSAY TEST FOR BLOOD FECAL: CPT

## 2019-09-11 ENCOUNTER — TELEPHONE (OUTPATIENT)
Dept: INTERNAL MEDICINE CLINIC | Facility: CLINIC | Age: 69
End: 2019-09-11

## 2019-09-11 NOTE — TELEPHONE ENCOUNTER
Called pt back, advised pt he should come in for appt for eval and come in sooner if symptoms return. Offered appt on Friday at 1:45 pm with JL, pt verbalized understanding.

## 2019-09-11 NOTE — TELEPHONE ENCOUNTER
Call pt to triage. Pt states he had one episode of blood in the urine yesterday afternoon. Pt reports his urine was getting darker yellow then had 1 episode of bloody urine then started to lighten up around 10 pm until it resolved itself.  Today urine is l

## 2019-09-13 ENCOUNTER — LABORATORY ENCOUNTER (OUTPATIENT)
Dept: LAB | Age: 69
End: 2019-09-13
Attending: INTERNAL MEDICINE
Payer: COMMERCIAL

## 2019-09-13 ENCOUNTER — OFFICE VISIT (OUTPATIENT)
Dept: INTERNAL MEDICINE CLINIC | Facility: CLINIC | Age: 69
End: 2019-09-13

## 2019-09-13 VITALS
RESPIRATION RATE: 16 BRPM | HEART RATE: 68 BPM | BODY MASS INDEX: 25.74 KG/M2 | HEIGHT: 75 IN | SYSTOLIC BLOOD PRESSURE: 120 MMHG | DIASTOLIC BLOOD PRESSURE: 76 MMHG | TEMPERATURE: 98 F | WEIGHT: 207 LBS

## 2019-09-13 DIAGNOSIS — R93.89 ABNORMAL CT SCAN: ICD-10-CM

## 2019-09-13 DIAGNOSIS — R05.9 COUGH: ICD-10-CM

## 2019-09-13 DIAGNOSIS — E11.9 TYPE 2 DIABETES MELLITUS WITHOUT COMPLICATION, WITHOUT LONG-TERM CURRENT USE OF INSULIN (HCC): ICD-10-CM

## 2019-09-13 DIAGNOSIS — R31.9 HEMATURIA, UNSPECIFIED TYPE: Primary | ICD-10-CM

## 2019-09-13 DIAGNOSIS — R31.9 HEMATURIA, UNSPECIFIED TYPE: ICD-10-CM

## 2019-09-13 LAB
ALBUMIN SERPL-MCNC: 4.1 G/DL (ref 3.4–5)
ALBUMIN/GLOB SERPL: 1.1 {RATIO} (ref 1–2)
ALP LIVER SERPL-CCNC: 55 U/L (ref 45–117)
ALT SERPL-CCNC: 88 U/L (ref 16–61)
ANION GAP SERPL CALC-SCNC: 6 MMOL/L (ref 0–18)
APPEARANCE: CLEAR
AST SERPL-CCNC: 38 U/L (ref 15–37)
BASOPHILS # BLD AUTO: 0.02 X10(3) UL (ref 0–0.2)
BASOPHILS NFR BLD AUTO: 0.3 %
BILIRUB SERPL-MCNC: 1 MG/DL (ref 0.1–2)
BILIRUBIN: NEGATIVE
BUN BLD-MCNC: 19 MG/DL (ref 7–18)
BUN/CREAT SERPL: 15.7 (ref 10–20)
CALCIUM BLD-MCNC: 9.2 MG/DL (ref 8.5–10.1)
CHLORIDE SERPL-SCNC: 104 MMOL/L (ref 98–112)
CO2 SERPL-SCNC: 28 MMOL/L (ref 21–32)
CREAT BLD-MCNC: 1.21 MG/DL (ref 0.7–1.3)
DEPRECATED RDW RBC AUTO: 44.7 FL (ref 35.1–46.3)
EOSINOPHIL # BLD AUTO: 0.12 X10(3) UL (ref 0–0.7)
EOSINOPHIL NFR BLD AUTO: 1.9 %
ERYTHROCYTE [DISTWIDTH] IN BLOOD BY AUTOMATED COUNT: 11.7 % (ref 11–15)
GLOBULIN PLAS-MCNC: 3.6 G/DL (ref 2.8–4.4)
GLUCOSE (URINE DIPSTICK): 500 MG/DL
GLUCOSE BLD-MCNC: 261 MG/DL (ref 70–99)
HCT VFR BLD AUTO: 42.4 % (ref 39–53)
HGB BLD-MCNC: 14.5 G/DL (ref 13–17.5)
IMM GRANULOCYTES # BLD AUTO: 0.02 X10(3) UL (ref 0–1)
IMM GRANULOCYTES NFR BLD: 0.3 %
LEUKOCYTES: NEGATIVE
LIPASE SERPL-CCNC: 82 U/L (ref 73–393)
LYMPHOCYTES # BLD AUTO: 1.89 X10(3) UL (ref 1–4)
LYMPHOCYTES NFR BLD AUTO: 30.3 %
M PROTEIN MFR SERPL ELPH: 7.7 G/DL (ref 6.4–8.2)
MCH RBC QN AUTO: 35.6 PG (ref 26–34)
MCHC RBC AUTO-ENTMCNC: 34.2 G/DL (ref 31–37)
MCV RBC AUTO: 104.2 FL (ref 80–100)
MONOCYTES # BLD AUTO: 0.74 X10(3) UL (ref 0.1–1)
MONOCYTES NFR BLD AUTO: 11.9 %
MULTISTIX LOT#: ABNORMAL NUMERIC
NEUTROPHILS # BLD AUTO: 3.45 X10 (3) UL (ref 1.5–7.7)
NEUTROPHILS # BLD AUTO: 3.45 X10(3) UL (ref 1.5–7.7)
NEUTROPHILS NFR BLD AUTO: 55.3 %
NITRITE, URINE: NEGATIVE
OSMOLALITY SERPL CALC.SUM OF ELEC: 297 MOSM/KG (ref 275–295)
PH, URINE: 6 (ref 4.5–8)
PLATELET # BLD AUTO: 129 10(3)UL (ref 150–450)
POTASSIUM SERPL-SCNC: 5.1 MMOL/L (ref 3.5–5.1)
PROTEIN (URINE DIPSTICK): 30 MG/DL
RBC # BLD AUTO: 4.07 X10(6)UL (ref 3.8–5.8)
SODIUM SERPL-SCNC: 138 MMOL/L (ref 136–145)
SPECIFIC GRAVITY: 1.01 (ref 1–1.03)
URINE-COLOR: YELLOW
UROBILINOGEN,SEMI-QN: 1 MG/DL (ref 0–1.9)
WBC # BLD AUTO: 6.2 X10(3) UL (ref 4–11)

## 2019-09-13 PROCEDURE — 85025 COMPLETE CBC W/AUTO DIFF WBC: CPT

## 2019-09-13 PROCEDURE — 81003 URINALYSIS AUTO W/O SCOPE: CPT | Performed by: INTERNAL MEDICINE

## 2019-09-13 PROCEDURE — 99214 OFFICE O/P EST MOD 30 MIN: CPT | Performed by: INTERNAL MEDICINE

## 2019-09-13 PROCEDURE — 80053 COMPREHEN METABOLIC PANEL: CPT

## 2019-09-13 PROCEDURE — 83690 ASSAY OF LIPASE: CPT

## 2019-09-16 RX ORDER — AMLODIPINE BESYLATE 2.5 MG/1
TABLET ORAL
Qty: 90 TABLET | Refills: 0 | Status: SHIPPED | OUTPATIENT
Start: 2019-09-16 | End: 2019-12-23

## 2019-09-16 NOTE — TELEPHONE ENCOUNTER
Last OV : 9/13/2019 sick visit  Upcoming appt/reason:  No future appointments. AMLODIPINE BESYLATE 2.5 MG Oral Tab 90 tablet 0 6/10/2019       Last labs: 9/13/19    Due for CPE in Dec.    Per protocol route.   Qty: 90 w/ 0 RF

## 2019-09-18 NOTE — PROGRESS NOTES
HPI:    Patient ID: Nicolasa Parker is a 77year old male.     HPI  Here as yesterday had visible blood in urine, no pain, ho kidney stones, non smoker, htn, dm, otherwise feels ok, chronic pnd and voice issues, wants second opinion  /76 (BP Location Allergies:No Known Allergies   PHYSICAL EXAM:   Physical Exam   Constitutional: He is oriented to person, place, and time. He appears well-developed and well-nourished. HENT:   Head: Normocephalic and atraumatic.    Right Ear: External ear normal.   L

## 2019-09-26 ENCOUNTER — OFFICE VISIT (OUTPATIENT)
Dept: SURGERY | Facility: CLINIC | Age: 69
End: 2019-09-26

## 2019-09-26 VITALS
SYSTOLIC BLOOD PRESSURE: 157 MMHG | WEIGHT: 203 LBS | DIASTOLIC BLOOD PRESSURE: 85 MMHG | TEMPERATURE: 98 F | HEART RATE: 75 BPM | BODY MASS INDEX: 25.24 KG/M2 | HEIGHT: 75 IN

## 2019-09-26 DIAGNOSIS — Z12.5 PROSTATE CANCER SCREENING: ICD-10-CM

## 2019-09-26 DIAGNOSIS — R31.0 GROSS HEMATURIA: Primary | ICD-10-CM

## 2019-09-26 LAB
BACTERIA UR QL AUTO: NEGATIVE /HPF
BILIRUB UR QL: NEGATIVE
COLOR UR: YELLOW
GLUCOSE UR-MCNC: >=500 MG/DL
LEUKOCYTE ESTERASE UR QL STRIP.AUTO: NEGATIVE
NITRITE UR QL STRIP.AUTO: NEGATIVE
PH UR: 6 [PH] (ref 5–8)
PROT UR-MCNC: 30 MG/DL
RBC #/AREA URNS AUTO: 74 /HPF
SP GR UR STRIP: 1.03 (ref 1–1.03)
UROBILINOGEN UR STRIP-ACNC: <2
WBC #/AREA URNS AUTO: <1 /HPF

## 2019-09-26 PROCEDURE — 99243 OFF/OP CNSLTJ NEW/EST LOW 30: CPT | Performed by: UROLOGY

## 2019-09-26 NOTE — PROGRESS NOTES
Rooming Clinician:     Kamari Cee is a 77year old male. Patient presents with:  Hematuria: PT presents for consult for gross hematuria. PT states this most recently started three weeks ago for one day.  PT states he does have hx of kidney stones and tablet Rfl: 3   atorvastatin 20 MG Oral Tab Take 1 tablet (20 mg total) by mouth nightly. Disp: 90 tablet Rfl: 1   Multiple Vitamins-Minerals (MULTI FOR HIM 50+ OR) Take by mouth daily.    Disp:  Rfl:      No Known Allergies   Past Medical History:   Bridget Carias exertion  CARDIOVASCULAR: denies chest pain on exertion  GI: denies abdominal pain and denies heartburn  : see HPI  NEURO: no sensory or motor complaint    EXAM:     /85   Pulse 75   Temp 98.2 °F (36.8 °C) (Oral)   Ht 6' 3\" (1.905 m)   Wt 203 lb ( the workup for the diagnosis of hematuria including x-rays such as IVP or CT scan or possibly ultrasounds, urine cytologies or other chemical tests, and cystoscopy. In the case of suspected infection and urine culture and sensitivity is often obtained.   I

## 2019-09-28 NOTE — PROGRESS NOTES
Your recent urine cytology is normal.  No evidence of cancer cells detected. Recommend follow up as directed.     Sincerely,  Rocky Ledbetter MD

## 2019-09-30 ENCOUNTER — TELEPHONE (OUTPATIENT)
Dept: SURGERY | Facility: CLINIC | Age: 69
End: 2019-09-30

## 2019-09-30 NOTE — TELEPHONE ENCOUNTER
----- Message from Deidre Queen MD sent at 9/28/2019  1:05 PM CDT -----  Your recent urine cytology is normal.  No evidence of cancer cells detected. Recommend follow up as directed.     Sincerely,  Macey Rivers MD

## 2019-10-02 ENCOUNTER — HOSPITAL ENCOUNTER (OUTPATIENT)
Dept: CT IMAGING | Facility: HOSPITAL | Age: 69
Discharge: HOME | End: 2019-10-02
Attending: UROLOGY
Payer: COMMERCIAL

## 2019-10-02 DIAGNOSIS — R31.0 GROSS HEMATURIA: ICD-10-CM

## 2019-10-02 PROCEDURE — 74178 CT ABD&PLV WO CNTR FLWD CNTR: CPT | Performed by: UROLOGY

## 2019-10-02 PROCEDURE — 76377 3D RENDER W/INTRP POSTPROCES: CPT

## 2019-10-03 ENCOUNTER — TELEPHONE (OUTPATIENT)
Dept: SURGERY | Facility: CLINIC | Age: 69
End: 2019-10-03

## 2019-10-03 NOTE — TELEPHONE ENCOUNTER
Pt has an appt 10/10 for cysto in [place]. Message   Received:  Today   Message Contents   Kym Quiros MD  [facility] Clinical Staff             Your recent CT scan shows a small stones in the left kidney and some thickening of the bladder. Vahid Nation

## 2019-10-10 ENCOUNTER — OFFICE VISIT (OUTPATIENT)
Dept: SURGERY | Facility: CLINIC | Age: 69
End: 2019-10-10

## 2019-10-10 VITALS — TEMPERATURE: 98 F | DIASTOLIC BLOOD PRESSURE: 79 MMHG | SYSTOLIC BLOOD PRESSURE: 131 MMHG | HEART RATE: 69 BPM

## 2019-10-10 DIAGNOSIS — K86.1 CHRONIC PANCREATITIS, UNSPECIFIED PANCREATITIS TYPE (HCC): ICD-10-CM

## 2019-10-10 DIAGNOSIS — R82.90 URINE FINDING: Primary | ICD-10-CM

## 2019-10-10 DIAGNOSIS — N40.0 ENLARGED PROSTATE: ICD-10-CM

## 2019-10-10 DIAGNOSIS — R82.998 CRYSTALLURIA: ICD-10-CM

## 2019-10-10 DIAGNOSIS — Z87.448 HISTORY OF GROSS HEMATURIA: ICD-10-CM

## 2019-10-10 PROCEDURE — 52000 CYSTOURETHROSCOPY: CPT | Performed by: UROLOGY

## 2019-10-10 RX ORDER — CIPROFLOXACIN 500 MG/1
500 TABLET, FILM COATED ORAL ONCE
Status: COMPLETED | OUTPATIENT
Start: 2019-10-10 | End: 2019-10-10

## 2019-10-10 RX ADMIN — CIPROFLOXACIN 500 MG: 500 TABLET, FILM COATED ORAL at 13:47:00

## 2019-10-10 NOTE — PROGRESS NOTES
Rooming Clinician:     Geena Camilo is a 77year old male.   Patient presents with:  Hematuria: here for possible cysto/states he hasn't had blood in his urine for several months  Miscellaneous Urology:  Chief Complaint: Patient presents with:  Ane Ronak changes in the spine and hips. OTHER:  None.     =====  CONCLUSION:       1. Findings concerning for cystitis. Clinical correlation recommended. 2. Atrophic pancreas appears unchanged.   There are relatively stable numerous calcifications scattered a 3   atorvastatin 20 MG Oral Tab Take 1 tablet (20 mg total) by mouth nightly. Disp: 90 tablet Rfl: 1   Multiple Vitamins-Minerals (MULTI FOR HIM 50+ OR) Take by mouth daily.    Disp:  Rfl:    acetaminophen 500 MG Oral Tab Take 1,000 mg by mouth every 6 (six unusual skin lesions or rashes  RESPIRATORY: denies shortness of breath with exertion  CARDIOVASCULAR: denies chest pain on exertion  GI: denies abdominal pain and denies heartburn  : see HPI  NEURO: no sensory or motor complaint    EXAM:     /79 ( 129.0 (L) 09/13/2019    CREATSERUM 1.21 09/13/2019    BUN 19 (H) 09/13/2019     09/13/2019    K 5.1 09/13/2019     09/13/2019    CO2 28.0 09/13/2019     (H) 09/13/2019    CA 9.2 09/13/2019    ALB 4.1 09/13/2019    ALKPHO 55 09/13/2019

## 2019-10-20 NOTE — PROGRESS NOTES
Jina Menchaca, referred back to us by Dr. Erik Sterling. Schedule with Dr. Khari Nolan as it is his patient.

## 2019-11-11 RX ORDER — GLIPIZIDE 5 MG/1
TABLET, FILM COATED, EXTENDED RELEASE ORAL
Qty: 90 TABLET | Refills: 0 | Status: SHIPPED | OUTPATIENT
Start: 2019-11-11 | End: 2020-02-18

## 2019-11-11 RX ORDER — ATORVASTATIN CALCIUM 20 MG/1
TABLET, FILM COATED ORAL
Qty: 90 TABLET | Refills: 1 | Status: SHIPPED | OUTPATIENT
Start: 2019-11-11 | End: 2020-06-18

## 2019-11-11 RX ORDER — ATENOLOL 50 MG/1
TABLET ORAL
Qty: 90 TABLET | Refills: 0 | Status: SHIPPED | OUTPATIENT
Start: 2019-11-11 | End: 2020-02-17

## 2019-11-11 NOTE — TELEPHONE ENCOUNTER
Last VISIT 09/13/19  Last REFILL Atorvastatin  09/26/2018 qty 90 w/1 refill. Glipizide 08/06/19 qty 90 w/0 refills  Last LABS 08/09/19 A1c was 7.7,     Per PROTOCOL? Failed       Please Approve or Deny.

## 2019-11-15 ENCOUNTER — LAB ENCOUNTER (OUTPATIENT)
Dept: LAB | Facility: HOSPITAL | Age: 69
End: 2019-11-15
Attending: STUDENT IN AN ORGANIZED HEALTH CARE EDUCATION/TRAINING PROGRAM
Payer: COMMERCIAL

## 2019-11-15 DIAGNOSIS — R74.8 ELEVATED LIVER ENZYMES: ICD-10-CM

## 2019-11-15 DIAGNOSIS — N40.0 ENLARGED PROSTATE: ICD-10-CM

## 2019-11-15 DIAGNOSIS — K86.1 IDIOPATHIC CHRONIC PANCREATITIS (HCC): ICD-10-CM

## 2019-11-15 DIAGNOSIS — Z12.5 PROSTATE CANCER SCREENING: ICD-10-CM

## 2019-11-15 PROCEDURE — 36415 COLL VENOUS BLD VENIPUNCTURE: CPT

## 2019-11-15 PROCEDURE — 83550 IRON BINDING TEST: CPT

## 2019-11-15 PROCEDURE — 87340 HEPATITIS B SURFACE AG IA: CPT

## 2019-11-15 PROCEDURE — 80053 COMPREHEN METABOLIC PANEL: CPT

## 2019-11-15 PROCEDURE — 84153 ASSAY OF PSA TOTAL: CPT

## 2019-11-15 PROCEDURE — 82787 IGG 1 2 3 OR 4 EACH: CPT

## 2019-11-15 PROCEDURE — 83540 ASSAY OF IRON: CPT

## 2019-11-15 PROCEDURE — 82728 ASSAY OF FERRITIN: CPT

## 2019-11-15 PROCEDURE — 83690 ASSAY OF LIPASE: CPT

## 2019-11-15 PROCEDURE — 86706 HEP B SURFACE ANTIBODY: CPT

## 2019-11-15 PROCEDURE — 86704 HEP B CORE ANTIBODY TOTAL: CPT

## 2019-11-15 PROCEDURE — 83516 IMMUNOASSAY NONANTIBODY: CPT

## 2019-11-15 PROCEDURE — 86038 ANTINUCLEAR ANTIBODIES: CPT

## 2019-11-15 PROCEDURE — 86803 HEPATITIS C AB TEST: CPT

## 2019-11-17 NOTE — PROGRESS NOTES
Yanique Lara,  Please let Anderson Neville know that labs are all stable. He should keep appt with Dr Luís Torres, as scheduled.     Thanks,  PM

## 2019-11-18 ENCOUNTER — TELEPHONE (OUTPATIENT)
Dept: INTERNAL MEDICINE CLINIC | Facility: CLINIC | Age: 69
End: 2019-11-18

## 2019-11-18 DIAGNOSIS — K22.719 BARRETT'S ESOPHAGUS WITH DYSPLASIA: Primary | ICD-10-CM

## 2019-11-18 RX ORDER — LOSARTAN POTASSIUM 100 MG/1
TABLET ORAL
Qty: 90 TABLET | Refills: 0 | Status: SHIPPED | OUTPATIENT
Start: 2019-11-18 | End: 2020-02-17

## 2019-11-18 NOTE — PROGRESS NOTES
Your recent PSA is normal.  Recommend follow up in the office as directed.     Sincerely,  Ivana Pulido MD

## 2019-11-18 NOTE — TELEPHONE ENCOUNTER
Received a fax from [facility] asking for a referral. It has been placed per JL approval

## 2019-11-20 PROBLEM — K86.1 OTHER CHRONIC PANCREATITIS (HCC): Status: ACTIVE | Noted: 2019-11-20

## 2019-12-11 ENCOUNTER — TELEPHONE (OUTPATIENT)
Dept: INTERNAL MEDICINE CLINIC | Facility: CLINIC | Age: 69
End: 2019-12-11

## 2019-12-11 DIAGNOSIS — E11.9 TYPE 2 DIABETES MELLITUS WITHOUT COMPLICATION, WITHOUT LONG-TERM CURRENT USE OF INSULIN (HCC): Primary | ICD-10-CM

## 2019-12-11 NOTE — TELEPHONE ENCOUNTER
Specialty: opthamologist    Full Name of Specialist: Dr Giovanny Ervin     Date of Appointment: 12/16/19    Reason for the Appointment (be specific):  Yearly exam    Has the patient seen a provider in our office for stated problem?:yes      Is this request for an out of network referral?   (if yes, please have patient contact referring provider and have them fax office visit notes to triage attention):

## 2019-12-16 ENCOUNTER — IMMUNIZATION (OUTPATIENT)
Dept: INTERNAL MEDICINE CLINIC | Facility: CLINIC | Age: 69
End: 2019-12-16

## 2019-12-16 DIAGNOSIS — Z23 NEED FOR VACCINATION: ICD-10-CM

## 2019-12-16 PROCEDURE — 90471 IMMUNIZATION ADMIN: CPT | Performed by: INTERNAL MEDICINE

## 2019-12-16 PROCEDURE — 90662 IIV NO PRSV INCREASED AG IM: CPT | Performed by: INTERNAL MEDICINE

## 2019-12-19 ENCOUNTER — HOSPITAL ENCOUNTER (OUTPATIENT)
Dept: CT IMAGING | Facility: HOSPITAL | Age: 69
Discharge: HOME | End: 2019-12-19
Attending: OTOLARYNGOLOGY
Payer: COMMERCIAL

## 2019-12-19 DIAGNOSIS — J32.0 CHRONIC MAXILLARY SINUSITIS: ICD-10-CM

## 2019-12-19 DIAGNOSIS — R49.0 HOARSENESS: ICD-10-CM

## 2019-12-19 DIAGNOSIS — R05.9 COUGH: ICD-10-CM

## 2019-12-19 PROCEDURE — 70486 CT MAXILLOFACIAL W/O DYE: CPT | Performed by: OTOLARYNGOLOGY

## 2019-12-20 NOTE — PROGRESS NOTES
Please inform ct sinuses showing left maxillary inflammation as well as mild inflammation in the left sphenoid sinus, no acute infection or fluid levels noted, recommend to follow up with DR Savi Youssef to further evaluate.

## 2019-12-21 NOTE — PROGRESS NOTES
Informed pt of results and recommendations per KO, verb understanding. Pt will call next week to schedule f/u as he does not have calendar with him currently.

## 2019-12-23 RX ORDER — AMLODIPINE BESYLATE 2.5 MG/1
TABLET ORAL
Qty: 90 TABLET | Refills: 0 | Status: SHIPPED | OUTPATIENT
Start: 2019-12-23 | End: 2020-03-20

## 2019-12-26 ENCOUNTER — TELEPHONE (OUTPATIENT)
Dept: INTERNAL MEDICINE CLINIC | Facility: CLINIC | Age: 69
End: 2019-12-26

## 2020-01-02 ENCOUNTER — HOSPITAL ENCOUNTER (OUTPATIENT)
Dept: MRI IMAGING | Facility: HOSPITAL | Age: 70
Discharge: HOME | End: 2020-01-02
Attending: INTERNAL MEDICINE
Payer: COMMERCIAL

## 2020-01-02 DIAGNOSIS — K86.1 OTHER CHRONIC PANCREATITIS (HCC): ICD-10-CM

## 2020-01-02 PROCEDURE — 74181 MRI ABDOMEN W/O CONTRAST: CPT | Performed by: INTERNAL MEDICINE

## 2020-01-02 PROCEDURE — 76376 3D RENDER W/INTRP POSTPROCES: CPT | Performed by: INTERNAL MEDICINE

## 2020-02-06 ENCOUNTER — APPOINTMENT (OUTPATIENT)
Dept: LAB | Facility: HOSPITAL | Age: 70
End: 2020-02-06
Attending: INTERNAL MEDICINE
Payer: COMMERCIAL

## 2020-02-06 DIAGNOSIS — K86.1 OTHER CHRONIC PANCREATITIS (HCC): ICD-10-CM

## 2020-02-06 PROCEDURE — 82656 EL-1 FECAL QUAL/SEMIQ: CPT

## 2020-02-09 LAB — PANCREATIC ELASTASE , FECAL: <15 UG/G

## 2020-02-17 RX ORDER — LOSARTAN POTASSIUM 100 MG/1
TABLET ORAL
Qty: 90 TABLET | Refills: 0 | Status: SHIPPED | OUTPATIENT
Start: 2020-02-17 | End: 2020-05-19

## 2020-02-17 RX ORDER — ATENOLOL 50 MG/1
TABLET ORAL
Qty: 90 TABLET | Refills: 0 | Status: SHIPPED | OUTPATIENT
Start: 2020-02-17 | End: 2020-05-19

## 2020-02-18 RX ORDER — GLIPIZIDE 5 MG/1
TABLET, FILM COATED, EXTENDED RELEASE ORAL
Qty: 90 TABLET | Refills: 0 | Status: SHIPPED | OUTPATIENT
Start: 2020-02-18 | End: 2020-05-20

## 2020-02-18 NOTE — TELEPHONE ENCOUNTER
Spoke to pt and scheduled appt   Future Appointments   Date Time Provider Linda Maddox   3/4/2020 10:45 AM Suzanne Trent MD [facility]

## 2020-02-18 NOTE — TELEPHONE ENCOUNTER
Last VISIT 09/13/19    Last REFILL 11/11/19 qty 90 w/0refills    Last LABS 11/15/19 Multiple labs done. No Future Appointments      Per PROTOCOL? Failed     Please Approve or Deny.

## 2020-03-04 ENCOUNTER — OFFICE VISIT (OUTPATIENT)
Dept: ENDOCRINOLOGY CLINIC | Facility: CLINIC | Age: 70
End: 2020-03-04

## 2020-03-04 VITALS
HEIGHT: 75 IN | SYSTOLIC BLOOD PRESSURE: 130 MMHG | BODY MASS INDEX: 25.61 KG/M2 | TEMPERATURE: 98 F | DIASTOLIC BLOOD PRESSURE: 78 MMHG | OXYGEN SATURATION: 99 % | WEIGHT: 206 LBS | HEART RATE: 65 BPM

## 2020-03-04 DIAGNOSIS — N52.9 ERECTILE DYSFUNCTION, UNSPECIFIED ERECTILE DYSFUNCTION TYPE: ICD-10-CM

## 2020-03-04 DIAGNOSIS — E11.9 TYPE 2 DIABETES MELLITUS WITHOUT COMPLICATION, WITHOUT LONG-TERM CURRENT USE OF INSULIN (HCC): Primary | ICD-10-CM

## 2020-03-04 DIAGNOSIS — R49.0 HOARSE VOICE QUALITY: ICD-10-CM

## 2020-03-04 LAB
CARTRIDGE LOT#: 564 NUMERIC
HEMOGLOBIN A1C: 7.5 % (ref 4.3–5.6)

## 2020-03-04 PROCEDURE — 90471 IMMUNIZATION ADMIN: CPT | Performed by: INTERNAL MEDICINE

## 2020-03-04 PROCEDURE — 99214 OFFICE O/P EST MOD 30 MIN: CPT | Performed by: INTERNAL MEDICINE

## 2020-03-04 PROCEDURE — 90732 PPSV23 VACC 2 YRS+ SUBQ/IM: CPT | Performed by: INTERNAL MEDICINE

## 2020-03-04 PROCEDURE — 83036 HEMOGLOBIN GLYCOSYLATED A1C: CPT | Performed by: INTERNAL MEDICINE

## 2020-03-04 PROCEDURE — 93000 ELECTROCARDIOGRAM COMPLETE: CPT | Performed by: INTERNAL MEDICINE

## 2020-03-04 RX ORDER — CLOTRIMAZOLE AND BETAMETHASONE DIPROPIONATE 10; .64 MG/G; MG/G
CREAM TOPICAL
Qty: 15 G | Refills: 0 | Status: SHIPPED | OUTPATIENT
Start: 2020-03-04 | End: 2020-08-12

## 2020-03-04 RX ORDER — SILDENAFIL 50 MG/1
50 TABLET, FILM COATED ORAL
Qty: 8 TABLET | Refills: 3 | Status: SHIPPED | OUTPATIENT
Start: 2020-03-04 | End: 2021-07-23

## 2020-03-05 NOTE — PROGRESS NOTES
HPI:    Patient ID: Keyonna Tovar is a 77year old male.     HPI  Here for dm, htn, hyperchol, didn't bring in sugars, rarely checks, discussed, chronic hoarse voice sees ent, co ed, problems maintaining, for several months, abnormal ct abd, discussed gi metFORMIN HCl  MG Oral Tablet 24 Hr TAKE 2 TABLETs BY MOUTH IN THE MORNING AND 2 TABLETS IN THE EVENING 360 tablet 1   • acetaminophen 500 MG Oral Tab Take 1,000 mg by mouth every 6 (six) hours as needed for Pain.      • PANTOPRAZOLE SODIUM 40 MG Oral imedicatley    Orders Placed This Encounter      Hgb A1C      Pneumococcal 23, Adult (Pneumovax) (85057) (Dx V03.82/Z23)      Meds This Visit:  Requested Prescriptions     Signed Prescriptions Disp Refills   • Empagliflozin (JARDIANCE) 10 MG Oral Tab 90 ta

## 2020-03-12 RX ORDER — METFORMIN HYDROCHLORIDE 500 MG/1
TABLET, EXTENDED RELEASE ORAL
Qty: 360 TABLET | Refills: 0 | Status: SHIPPED | OUTPATIENT
Start: 2020-03-12 | End: 2020-06-18

## 2020-03-12 NOTE — TELEPHONE ENCOUNTER
Last Ov: 9/13/19, JL, acute  Upcoming appt: no upcoming appt  Last labs: A1c 3/4/20  Last Rx: metformin ER 500mg, #360, 1R, 8/9/19    Per Protocol, failed. Last A1c out of range. Rx pending.

## 2020-03-20 RX ORDER — AMLODIPINE BESYLATE 2.5 MG/1
TABLET ORAL
Qty: 90 TABLET | Refills: 0 | Status: SHIPPED | OUTPATIENT
Start: 2020-03-20 | End: 2020-06-30

## 2020-05-19 RX ORDER — LOSARTAN POTASSIUM 100 MG/1
TABLET ORAL
Qty: 90 TABLET | Refills: 0 | Status: SHIPPED | OUTPATIENT
Start: 2020-05-19 | End: 2020-08-10

## 2020-05-19 RX ORDER — ATENOLOL 50 MG/1
TABLET ORAL
Qty: 90 TABLET | Refills: 0 | Status: SHIPPED | OUTPATIENT
Start: 2020-05-19 | End: 2020-08-27

## 2020-05-19 NOTE — TELEPHONE ENCOUNTER
Atenolol and Losartan-PASSED per protocol, refill sent.      Last OV 3.4.20 [facility]/ [facility]  Last PE 12.14.18-Overdue   Last REFILL 2.18.20 Glipizide ER 5mg #90 0R  Last LABS 3.4.20 HgA1c    Future Appointments   Date Time Provider Department Tere

## 2020-05-20 RX ORDER — GLIPIZIDE 5 MG/1
TABLET, FILM COATED, EXTENDED RELEASE ORAL
Qty: 90 TABLET | Refills: 0 | Status: SHIPPED | OUTPATIENT
Start: 2020-05-20 | End: 2020-08-27

## 2020-06-12 ENCOUNTER — TELEPHONE (OUTPATIENT)
Dept: INTERNAL MEDICINE CLINIC | Facility: CLINIC | Age: 70
End: 2020-06-12

## 2020-06-12 NOTE — TELEPHONE ENCOUNTER
Pt saw something on the news re:? Metformin. Is it safe to take? Calling to obtain JL medical advice. He is due for a refill but he doesn't want to get 3 months worth if its not safe to take.  Please advise

## 2020-06-17 NOTE — TELEPHONE ENCOUNTER
Last Ov:9/13/19 BETINA  Upcoming appt:none  Last labs:3/4/20 A1C  Last Rx:3/12/20 metformin HCL ER 500mg, 11/11/19 Atorvastatin 20mg    Per Protocol sent for review

## 2020-06-17 NOTE — TELEPHONE ENCOUNTER
Patient notified to call his pharmacist to discuss Metformin, lot numbers etc. Patient verbalizes understanding.

## 2020-06-18 RX ORDER — ATORVASTATIN CALCIUM 20 MG/1
TABLET, FILM COATED ORAL
Qty: 30 TABLET | Refills: 0 | Status: SHIPPED | OUTPATIENT
Start: 2020-06-18 | End: 2020-07-17

## 2020-06-18 RX ORDER — METFORMIN HYDROCHLORIDE 500 MG/1
TABLET, EXTENDED RELEASE ORAL
Qty: 90 TABLET | Refills: 0 | Status: SHIPPED | OUTPATIENT
Start: 2020-06-18 | End: 2020-07-17

## 2020-06-24 ENCOUNTER — TELEPHONE (OUTPATIENT)
Dept: INTERNAL MEDICINE CLINIC | Facility: CLINIC | Age: 70
End: 2020-06-24

## 2020-06-24 DIAGNOSIS — Z00.00 ROUTINE GENERAL MEDICAL EXAMINATION AT A HEALTH CARE FACILITY: Primary | ICD-10-CM

## 2020-06-24 DIAGNOSIS — Z13.0 SCREENING FOR BLOOD DISEASE: ICD-10-CM

## 2020-06-24 DIAGNOSIS — Z13.220 SCREENING FOR LIPID DISORDERS: ICD-10-CM

## 2020-06-24 DIAGNOSIS — E11.9 TYPE 2 DIABETES MELLITUS WITHOUT COMPLICATION, WITHOUT LONG-TERM CURRENT USE OF INSULIN (HCC): ICD-10-CM

## 2020-06-24 DIAGNOSIS — Z13.228 SCREENING FOR METABOLIC DISORDER: ICD-10-CM

## 2020-06-24 NOTE — TELEPHONE ENCOUNTER
Future Appointments   Date Time Provider Linda Maddox   9/4/2020  1:00 PM Prem Vanegas MD [facility]     Orders to edward- Pt informed that labs need to be completed no sooner than 2 weeks prior to the appt.  Pt aware to fast-no call ellie

## 2020-06-24 NOTE — TELEPHONE ENCOUNTER
Spoke to patient-agreed he is overdue for OV. [facility]    Pt is taking Jardiance 10mg. FYI to BETINA.

## 2020-06-30 RX ORDER — AMLODIPINE BESYLATE 2.5 MG/1
TABLET ORAL
Qty: 90 TABLET | Refills: 0 | Status: SHIPPED | OUTPATIENT
Start: 2020-06-30 | End: 2020-09-04

## 2020-06-30 NOTE — TELEPHONE ENCOUNTER
Per protocol, amLODIPine Besylate 2.5 MG Oral Tablet passed. Rx sent. Routing TE to [address] because patient overdue for annual PE. Please call to schedule.

## 2020-07-01 NOTE — TELEPHONE ENCOUNTER
Future Appointments   Date Time Provider Linda Varsha   9/4/2020  1:00 PM Sai Cuba MD [facility]

## 2020-07-17 RX ORDER — ATORVASTATIN CALCIUM 20 MG/1
TABLET, FILM COATED ORAL
Qty: 30 TABLET | Refills: 2 | Status: SHIPPED | OUTPATIENT
Start: 2020-07-17 | End: 2020-10-27

## 2020-07-17 RX ORDER — METFORMIN HYDROCHLORIDE 500 MG/1
TABLET, EXTENDED RELEASE ORAL
Qty: 360 TABLET | Refills: 2 | Status: SHIPPED | OUTPATIENT
Start: 2020-07-17 | End: 2021-05-17

## 2020-07-17 NOTE — TELEPHONE ENCOUNTER
Last Ov: 3/11/19, JL, Pre Op  Last labs: UA, Lipase, CBC, CMP 9/13/19  Last Rx:  Atorvastatin 20mg, #30, 0R 6/18/20  Metformin ER 500mg, #90, 0R 6/18/20    Future Appointments   Date Time Provider Linda Maddox   9/4/2020  1:00 PM Marilin Salazar MD

## 2020-08-10 RX ORDER — LOSARTAN POTASSIUM 100 MG/1
TABLET ORAL
Qty: 90 TABLET | Refills: 0 | Status: SHIPPED | OUTPATIENT
Start: 2020-08-10

## 2020-08-10 NOTE — TELEPHONE ENCOUNTER
Last VISIT 03/04/20    Last REFILL 05/19/20 qty 0 w/0 refills    Last LABS 03/04/20 a1c done    Future Appointments   Date Time Provider Linda Maddox   9/4/2020  1:00 PM Horacio Mcleod MD [facility]         Per PROTOCOL?  Passed    Please

## 2020-08-12 RX ORDER — CLOTRIMAZOLE AND BETAMETHASONE DIPROPIONATE 10; .64 MG/G; MG/G
CREAM TOPICAL
Qty: 15 G | Refills: 0 | Status: SHIPPED | OUTPATIENT
Start: 2020-08-12 | End: 2021-07-28 | Stop reason: ALTCHOICE

## 2020-08-12 NOTE — TELEPHONE ENCOUNTER
Last Ov: 3/11/19, JL, Pre op  Last labs: CBC, CMP 9/13/19  Last Rx: clotrimazole-betamethasone 1-0.05%, 15g, 0R 3/4/20    Future Appointments   Date Time Provider Linda Maddox   9/4/2020  1:00 PM Anne Owens MD [facility]   10/8/2020

## 2020-08-27 ENCOUNTER — LAB ENCOUNTER (OUTPATIENT)
Dept: LAB | Age: 70
End: 2020-08-27
Attending: NURSE PRACTITIONER
Payer: COMMERCIAL

## 2020-08-27 DIAGNOSIS — E11.9 TYPE 2 DIABETES MELLITUS WITHOUT COMPLICATION, WITHOUT LONG-TERM CURRENT USE OF INSULIN (HCC): ICD-10-CM

## 2020-08-27 DIAGNOSIS — Z13.220 SCREENING FOR LIPID DISORDERS: ICD-10-CM

## 2020-08-27 DIAGNOSIS — Z13.228 SCREENING FOR METABOLIC DISORDER: ICD-10-CM

## 2020-08-27 DIAGNOSIS — Z00.00 ROUTINE GENERAL MEDICAL EXAMINATION AT A HEALTH CARE FACILITY: ICD-10-CM

## 2020-08-27 DIAGNOSIS — Z13.0 SCREENING FOR BLOOD DISEASE: ICD-10-CM

## 2020-08-27 LAB
ALBUMIN SERPL-MCNC: 3.6 G/DL (ref 3.4–5)
ALBUMIN/GLOB SERPL: 1 {RATIO} (ref 1–2)
ALP LIVER SERPL-CCNC: 62 U/L (ref 45–117)
ALT SERPL-CCNC: 44 U/L (ref 16–61)
ANION GAP SERPL CALC-SCNC: 7 MMOL/L (ref 0–18)
AST SERPL-CCNC: 32 U/L (ref 15–37)
BASOPHILS # BLD AUTO: 0.02 X10(3) UL (ref 0–0.2)
BASOPHILS NFR BLD AUTO: 0.3 %
BILIRUB SERPL-MCNC: 1.5 MG/DL (ref 0.1–2)
BUN BLD-MCNC: 18 MG/DL (ref 7–18)
BUN/CREAT SERPL: 14.5 (ref 10–20)
CALCIUM BLD-MCNC: 9.2 MG/DL (ref 8.5–10.1)
CHLORIDE SERPL-SCNC: 102 MMOL/L (ref 98–112)
CHOLEST SMN-MCNC: 114 MG/DL (ref ?–200)
CO2 SERPL-SCNC: 26 MMOL/L (ref 21–32)
CREAT BLD-MCNC: 1.24 MG/DL (ref 0.7–1.3)
CREAT UR-SCNC: 97.8 MG/DL
DEPRECATED RDW RBC AUTO: 48.1 FL (ref 35.1–46.3)
EOSINOPHIL # BLD AUTO: 0.11 X10(3) UL (ref 0–0.7)
EOSINOPHIL NFR BLD AUTO: 1.9 %
ERYTHROCYTE [DISTWIDTH] IN BLOOD BY AUTOMATED COUNT: 12.1 % (ref 11–15)
EST. AVERAGE GLUCOSE BLD GHB EST-MCNC: 128 MG/DL (ref 68–126)
GLOBULIN PLAS-MCNC: 3.6 G/DL (ref 2.8–4.4)
GLUCOSE BLD-MCNC: 144 MG/DL (ref 70–99)
HBA1C MFR BLD HPLC: 6.1 % (ref ?–5.7)
HCT VFR BLD AUTO: 42.1 % (ref 39–53)
HDLC SERPL-MCNC: 62 MG/DL (ref 40–59)
HGB BLD-MCNC: 14.4 G/DL (ref 13–17.5)
IMM GRANULOCYTES # BLD AUTO: 0.01 X10(3) UL (ref 0–1)
IMM GRANULOCYTES NFR BLD: 0.2 %
LDLC SERPL CALC-MCNC: 34 MG/DL (ref ?–100)
LYMPHOCYTES # BLD AUTO: 2.1 X10(3) UL (ref 1–4)
LYMPHOCYTES NFR BLD AUTO: 35.8 %
M PROTEIN MFR SERPL ELPH: 7.2 G/DL (ref 6.4–8.2)
MCH RBC QN AUTO: 36.3 PG (ref 26–34)
MCHC RBC AUTO-ENTMCNC: 34.2 G/DL (ref 31–37)
MCV RBC AUTO: 106 FL (ref 80–100)
MICROALBUMIN UR-MCNC: 1.66 MG/DL
MICROALBUMIN/CREAT 24H UR-RTO: 17 UG/MG (ref ?–30)
MONOCYTES # BLD AUTO: 0.53 X10(3) UL (ref 0.1–1)
MONOCYTES NFR BLD AUTO: 9 %
NEUTROPHILS # BLD AUTO: 3.1 X10 (3) UL (ref 1.5–7.7)
NEUTROPHILS # BLD AUTO: 3.1 X10(3) UL (ref 1.5–7.7)
NEUTROPHILS NFR BLD AUTO: 52.8 %
NONHDLC SERPL-MCNC: 52 MG/DL (ref ?–130)
OSMOLALITY SERPL CALC.SUM OF ELEC: 284 MOSM/KG (ref 275–295)
PATIENT FASTING Y/N/NP: YES
PATIENT FASTING Y/N/NP: YES
PLATELET # BLD AUTO: 116 10(3)UL (ref 150–450)
POTASSIUM SERPL-SCNC: 4.7 MMOL/L (ref 3.5–5.1)
RBC # BLD AUTO: 3.97 X10(6)UL (ref 3.8–5.8)
SODIUM SERPL-SCNC: 135 MMOL/L (ref 136–145)
TRIGL SERPL-MCNC: 88 MG/DL (ref 30–149)
VLDLC SERPL CALC-MCNC: 18 MG/DL (ref 0–30)
WBC # BLD AUTO: 5.9 X10(3) UL (ref 4–11)

## 2020-08-27 PROCEDURE — 80053 COMPREHEN METABOLIC PANEL: CPT

## 2020-08-27 PROCEDURE — 82570 ASSAY OF URINE CREATININE: CPT

## 2020-08-27 PROCEDURE — 80061 LIPID PANEL: CPT

## 2020-08-27 PROCEDURE — 85025 COMPLETE CBC W/AUTO DIFF WBC: CPT

## 2020-08-27 PROCEDURE — 82043 UR ALBUMIN QUANTITATIVE: CPT

## 2020-08-27 PROCEDURE — 83036 HEMOGLOBIN GLYCOSYLATED A1C: CPT

## 2020-08-27 RX ORDER — GLIPIZIDE 5 MG/1
TABLET, FILM COATED, EXTENDED RELEASE ORAL
Qty: 90 TABLET | Refills: 0 | Status: SHIPPED | OUTPATIENT
Start: 2020-08-27 | End: 2020-11-30

## 2020-08-27 RX ORDER — ATENOLOL 50 MG/1
TABLET ORAL
Qty: 90 TABLET | Refills: 0 | Status: SHIPPED | OUTPATIENT
Start: 2020-08-27 | End: 2020-11-30

## 2020-08-27 RX ORDER — EMPAGLIFLOZIN 10 MG/1
TABLET, FILM COATED ORAL
Qty: 90 TABLET | Refills: 0 | Status: SHIPPED | OUTPATIENT
Start: 2020-08-27 | End: 2020-11-30

## 2020-08-27 NOTE — TELEPHONE ENCOUNTER
Last Ov: 9/13/19, JL, acute  Last labs: A1c 3/4/20  Last Rx:   Glipizide ER 5mg, #90, 0R 5/20/20  Jardiance 10mg, #90, 1R 3/4/20    Future Appointments   Date Time Provider Linda Maddox   8/27/2020 12:15 PM REF [address] RD REF [id number] Ref Book 14   9/4/2020

## 2020-09-04 ENCOUNTER — OFFICE VISIT (OUTPATIENT)
Dept: INTERNAL MEDICINE CLINIC | Facility: CLINIC | Age: 70
End: 2020-09-04

## 2020-09-04 VITALS
BODY MASS INDEX: 24.62 KG/M2 | HEIGHT: 75 IN | SYSTOLIC BLOOD PRESSURE: 104 MMHG | TEMPERATURE: 98 F | WEIGHT: 198 LBS | DIASTOLIC BLOOD PRESSURE: 62 MMHG | HEART RATE: 64 BPM

## 2020-09-04 DIAGNOSIS — Z12.11 SPECIAL SCREENING FOR MALIGNANT NEOPLASMS, COLON: ICD-10-CM

## 2020-09-04 DIAGNOSIS — I10 ESSENTIAL HYPERTENSION WITH GOAL BLOOD PRESSURE LESS THAN 130/80: ICD-10-CM

## 2020-09-04 DIAGNOSIS — E11.9 TYPE 2 DIABETES MELLITUS WITHOUT COMPLICATION, WITHOUT LONG-TERM CURRENT USE OF INSULIN (HCC): ICD-10-CM

## 2020-09-04 DIAGNOSIS — K21.9 GASTROESOPHAGEAL REFLUX DISEASE WITHOUT ESOPHAGITIS: ICD-10-CM

## 2020-09-04 DIAGNOSIS — K86.1 OTHER CHRONIC PANCREATITIS (HCC): ICD-10-CM

## 2020-09-04 DIAGNOSIS — D69.6 THROMBOCYTOPENIA (HCC): ICD-10-CM

## 2020-09-04 DIAGNOSIS — E78.00 PURE HYPERCHOLESTEROLEMIA: Primary | ICD-10-CM

## 2020-09-04 DIAGNOSIS — I25.10 CORONARY ARTERY DISEASE INVOLVING NATIVE HEART WITHOUT ANGINA PECTORIS, UNSPECIFIED VESSEL OR LESION TYPE: ICD-10-CM

## 2020-09-04 PROCEDURE — 99212 OFFICE O/P EST SF 10 MIN: CPT | Performed by: INTERNAL MEDICINE

## 2020-09-04 PROCEDURE — 90662 IIV NO PRSV INCREASED AG IM: CPT | Performed by: INTERNAL MEDICINE

## 2020-09-04 PROCEDURE — 3078F DIAST BP <80 MM HG: CPT | Performed by: INTERNAL MEDICINE

## 2020-09-04 PROCEDURE — 99397 PER PM REEVAL EST PAT 65+ YR: CPT | Performed by: INTERNAL MEDICINE

## 2020-09-04 PROCEDURE — 90471 IMMUNIZATION ADMIN: CPT | Performed by: INTERNAL MEDICINE

## 2020-09-04 PROCEDURE — 3008F BODY MASS INDEX DOCD: CPT | Performed by: INTERNAL MEDICINE

## 2020-09-04 PROCEDURE — 3074F SYST BP LT 130 MM HG: CPT | Performed by: INTERNAL MEDICINE

## 2020-09-04 NOTE — PROGRESS NOTES
HPI:    Patient ID: Va Frances is a 72year old male.     HPI  Here for pe, dm, htn, hyperchol, gerd controlled, sugars ok, no abd pain feels well  /62 (BP Location: Right arm, Patient Position: Sitting, Cuff Size: adult)   Pulse 64   Temp 98.4 nightly. 90 tablet 3   • Multiple Vitamins-Minerals (MULTI FOR HIM 50+ OR) Take by mouth daily. Allergies:No Known Allergies   PHYSICAL EXAM:   Physical Exam   Constitutional: He is oriented to person, place, and time.  He appears well-developed and were placed in this encounter.       Meds This Visit:  Requested Prescriptions      No prescriptions requested or ordered in this encounter       Imaging & Referrals:  None       #[id number]

## 2020-09-28 RX ORDER — AMLODIPINE BESYLATE 2.5 MG/1
TABLET ORAL
Qty: 90 TABLET | Refills: 0 | Status: SHIPPED | OUTPATIENT
Start: 2020-09-28 | End: 2021-01-12

## 2020-10-11 ENCOUNTER — LAB ENCOUNTER (OUTPATIENT)
Dept: LAB | Facility: HOSPITAL | Age: 70
End: 2020-10-11
Attending: INTERNAL MEDICINE
Payer: COMMERCIAL

## 2020-10-11 DIAGNOSIS — Z12.11 SPECIAL SCREENING FOR MALIGNANT NEOPLASMS, COLON: ICD-10-CM

## 2020-10-11 PROCEDURE — 82274 ASSAY TEST FOR BLOOD FECAL: CPT

## 2020-10-22 ENCOUNTER — OFFICE VISIT (OUTPATIENT)
Dept: SURGERY | Facility: CLINIC | Age: 70
End: 2020-10-22

## 2020-10-22 VITALS — SYSTOLIC BLOOD PRESSURE: 138 MMHG | DIASTOLIC BLOOD PRESSURE: 75 MMHG | HEART RATE: 77 BPM

## 2020-10-22 DIAGNOSIS — Z12.5 PROSTATE CANCER SCREENING: Primary | ICD-10-CM

## 2020-10-22 DIAGNOSIS — N20.0 KIDNEY STONE: ICD-10-CM

## 2020-10-22 DIAGNOSIS — N47.1 PHIMOSIS: ICD-10-CM

## 2020-10-22 PROCEDURE — 81003 URINALYSIS AUTO W/O SCOPE: CPT | Performed by: UROLOGY

## 2020-10-22 PROCEDURE — 3078F DIAST BP <80 MM HG: CPT | Performed by: UROLOGY

## 2020-10-22 PROCEDURE — 3075F SYST BP GE 130 - 139MM HG: CPT | Performed by: UROLOGY

## 2020-10-22 PROCEDURE — 99213 OFFICE O/P EST LOW 20 MIN: CPT | Performed by: UROLOGY

## 2020-10-22 RX ORDER — CLOBETASOL PROPIONATE 0.5 MG/G
1 CREAM TOPICAL 2 TIMES DAILY
Qty: 1 TUBE | Refills: 1 | Status: ON HOLD | OUTPATIENT
Start: 2020-10-22 | End: 2021-02-10

## 2020-10-22 NOTE — PATIENT INSTRUCTIONS
Phimosis   The foreskin is the skin covering the head of the penis (glans). In most babies, the foreskin can't be pulled back (retracted). This is because of the narrow opening at the tip of the foreskin and its attachment to the head of the penis.  The i · You are worried about your son's penis or are unsure of the proper care  Kenna last reviewed this educational content on 9/1/2019  © 4443-1001 [employer]. [address] [facility], [address] [place] [address] [place]. All rights reserved.  This information You will be taken to a recovery area where you’ll recover from the anesthesia. Nurses will check on you as you rest. They can also give you pain medicine if needed. Your healthcare provider will tell you when it’s OK for you to go home.  This will be the sa · Bleeding that isn’t controlled by applying gentle pressure  · Inability to urinate  Kenna last reviewed this educational content on 10/1/2019  © 2290-3181 [employer]. 1407 [facility], [address] [place], [place] [address]. All rights reserved.  This

## 2020-10-22 NOTE — PROGRESS NOTES
Rooming Clinician:     Tatyana Collazo is a 72year old male. Patient presents with: Follow - Up: c/o Patient presents for yearly check up with no complaints         HPI:     Patient returns for follow-up examination.   He had an episode of gross hematur Multiple Vitamins-Minerals (MULTI FOR HIM 50+ OR) Take by mouth daily.          No Known Allergies   Past Medical History:   Diagnosis Date   • Blood disorder     thrombocytopenia   • Coronary atherosclerosis    • Diabetes ([address]    • Elevated blood pressure complaint    EXAM:     /75 (BP Location: Right arm, Patient Position: Sitting, Cuff Size: large)   Pulse 77   GENERAL: well developed, well nourished,in no apparent distress  SKIN: no rashes,no suspicious lesions  HEENT: atraumatic, normocephalic,ear

## 2020-10-27 RX ORDER — ATORVASTATIN CALCIUM 20 MG/1
TABLET, FILM COATED ORAL
Qty: 90 TABLET | Refills: 0 | Status: SHIPPED | OUTPATIENT
Start: 2020-10-27 | End: 2021-01-12

## 2020-11-23 ENCOUNTER — OFFICE VISIT (OUTPATIENT)
Dept: INTERNAL MEDICINE CLINIC | Facility: CLINIC | Age: 70
End: 2020-11-23

## 2020-11-23 VITALS
SYSTOLIC BLOOD PRESSURE: 110 MMHG | WEIGHT: 196 LBS | HEART RATE: 64 BPM | HEIGHT: 75 IN | BODY MASS INDEX: 24.37 KG/M2 | RESPIRATION RATE: 16 BRPM | TEMPERATURE: 97 F | DIASTOLIC BLOOD PRESSURE: 74 MMHG

## 2020-11-23 DIAGNOSIS — S61.412A LACERATION OF LEFT HAND WITHOUT FOREIGN BODY, INITIAL ENCOUNTER: Primary | ICD-10-CM

## 2020-11-23 PROCEDURE — 99212 OFFICE O/P EST SF 10 MIN: CPT | Performed by: INTERNAL MEDICINE

## 2020-11-23 PROCEDURE — 3074F SYST BP LT 130 MM HG: CPT | Performed by: INTERNAL MEDICINE

## 2020-11-23 PROCEDURE — 3008F BODY MASS INDEX DOCD: CPT | Performed by: INTERNAL MEDICINE

## 2020-11-23 PROCEDURE — 3078F DIAST BP <80 MM HG: CPT | Performed by: INTERNAL MEDICINE

## 2020-11-27 NOTE — PROGRESS NOTES
HPI:    Patient ID: Luz Maria Jesus is a 72year old male.     HPI  Tore skin back of left hand making bed yesterday, no fever  /74 (BP Location: Right arm, Patient Position: Sitting, Cuff Size: adult)   Pulse 64   Temp 97.1 °F (36.2 °C) (Temporal) time. He appears well-developed and well-nourished. HENT:   Head: Normocephalic and atraumatic. Neurological: He is oriented to person, place, and time.    Skin: He is not diaphoretic.   2cm skin tear now reaprox              ASSESSMENT/PLAN:   Jay Dobson

## 2020-11-30 RX ORDER — ATENOLOL 50 MG/1
TABLET ORAL
Qty: 90 TABLET | Refills: 0 | Status: SHIPPED | OUTPATIENT
Start: 2020-11-30 | End: 2021-03-11

## 2020-11-30 RX ORDER — EMPAGLIFLOZIN 10 MG/1
TABLET, FILM COATED ORAL
Qty: 90 TABLET | Refills: 0 | Status: SHIPPED | OUTPATIENT
Start: 2020-11-30 | End: 2021-03-11

## 2020-11-30 RX ORDER — GLIPIZIDE 5 MG/1
TABLET, FILM COATED, EXTENDED RELEASE ORAL
Qty: 90 TABLET | Refills: 0 | Status: SHIPPED | OUTPATIENT
Start: 2020-11-30 | End: 2021-03-29

## 2020-12-03 ENCOUNTER — OFFICE VISIT (OUTPATIENT)
Dept: SURGERY | Facility: CLINIC | Age: 70
End: 2020-12-03

## 2020-12-03 VITALS — HEART RATE: 76 BPM | DIASTOLIC BLOOD PRESSURE: 79 MMHG | SYSTOLIC BLOOD PRESSURE: 127 MMHG | TEMPERATURE: 98 F

## 2020-12-03 DIAGNOSIS — R82.90 URINE FINDING: Primary | ICD-10-CM

## 2020-12-03 DIAGNOSIS — N47.1 PHIMOSIS: ICD-10-CM

## 2020-12-03 PROCEDURE — 99213 OFFICE O/P EST LOW 20 MIN: CPT | Performed by: UROLOGY

## 2020-12-03 PROCEDURE — 3078F DIAST BP <80 MM HG: CPT | Performed by: UROLOGY

## 2020-12-03 PROCEDURE — 81003 URINALYSIS AUTO W/O SCOPE: CPT | Performed by: UROLOGY

## 2020-12-03 PROCEDURE — 3074F SYST BP LT 130 MM HG: CPT | Performed by: UROLOGY

## 2020-12-03 RX ORDER — CLOBETASOL PROPIONATE 0.5 MG/G
1 CREAM TOPICAL 2 TIMES DAILY
Qty: 1 TUBE | Refills: 1 | Status: ON HOLD | OUTPATIENT
Start: 2020-12-03 | End: 2021-02-10

## 2020-12-03 NOTE — PROGRESS NOTES
Rooming Clinician:     Kamari Cee is a 72year old male. Patient presents with: Follow - Up        HPI:     Patient returns for follow-up.   He developed phimosis and has been using clobetasol cream over the 5 myotic preface and states that most of occult blood testing 5/17/17    Negative for Occult Blood   • Essential hypertension    • Heart attack ([address]     w/stent placememnt   • High blood pressure    • High cholesterol    • Hyperlipidemia    • Kidney stone    • Pure hypercholesterolemia    • Sten normal respiratory motion without distress  CARDIO: normal peripheral perfusion  ABDOMEN: no masses,  tenderness, or hernia  : The penis is uncircumcised with phimosis which has improved. Urethral meatus is patent without discharge.   NEURO: grossly norm

## 2021-01-12 RX ORDER — AMLODIPINE BESYLATE 2.5 MG/1
TABLET ORAL
Qty: 90 TABLET | Refills: 0 | Status: SHIPPED | OUTPATIENT
Start: 2021-01-12 | End: 2021-04-13

## 2021-01-12 RX ORDER — ATORVASTATIN CALCIUM 20 MG/1
TABLET, FILM COATED ORAL
Qty: 90 TABLET | Refills: 0 | Status: SHIPPED | OUTPATIENT
Start: 2021-01-12 | End: 2021-05-25

## 2021-01-14 ENCOUNTER — TELEPHONE (OUTPATIENT)
Dept: SURGERY | Facility: CLINIC | Age: 71
End: 2021-01-14

## 2021-01-14 ENCOUNTER — OFFICE VISIT (OUTPATIENT)
Dept: SURGERY | Facility: CLINIC | Age: 71
End: 2021-01-14

## 2021-01-14 VITALS — TEMPERATURE: 97 F | DIASTOLIC BLOOD PRESSURE: 71 MMHG | HEART RATE: 69 BPM | SYSTOLIC BLOOD PRESSURE: 126 MMHG

## 2021-01-14 DIAGNOSIS — N47.1 PHIMOSIS: Primary | ICD-10-CM

## 2021-01-14 PROCEDURE — 99212 OFFICE O/P EST SF 10 MIN: CPT | Performed by: UROLOGY

## 2021-01-14 PROCEDURE — 3078F DIAST BP <80 MM HG: CPT | Performed by: UROLOGY

## 2021-01-14 PROCEDURE — 3074F SYST BP LT 130 MM HG: CPT | Performed by: UROLOGY

## 2021-01-14 NOTE — PROGRESS NOTES
Rooming Clinician:     Shy Yarbrough is a 72year old male. Patient presents with: Follow - Up: surgery discussion (?)        HPI:     Patient has persistent phimosis of the penis and requests circumcision.   Topical clobetasol has not resolved the phi testing 5/17/17    Negative for Occult Blood   • Essential hypertension    • Heart attack ([address]     w/stent placememnt   • High blood pressure    • High cholesterol    • Hyperlipidemia    • Kidney stone    • Pure hypercholesterolemia    • Stented coronary respiratory motion without distress  CARDIO: normal peripheral perfusion  ABDOMEN: no masses,  tenderness, or hernia  : The penis is uncircumcised with phimosis. Urethral meatus is patent without discharge.   NEURO: grossly normal  EXTREMITIES: no cyanos penis for a period of time. The patient understands potential risks and complications of the procedure and wishes to proceed.       Diagnoses and all orders for this visit:    Phimosis            The patient indicates understanding of these issues and agre

## 2021-01-14 NOTE — TELEPHONE ENCOUNTER
Surgeon: [facility]/ASC : [facility]  Assistant:   Location:   Procedure: CIRCUMCISION  Anesthesia: GEN  Time frame: FEB  Diagnosis: PHIMOSIS    Special instructions/equipment:  Postop follow-up:

## 2021-01-14 NOTE — TELEPHONE ENCOUNTER
Called patient this date.   Left message for patient to call back directly at [phone] to schedule

## 2021-01-19 ENCOUNTER — TELEPHONE (OUTPATIENT)
Dept: SURGERY | Facility: CLINIC | Age: 71
End: 2021-01-19

## 2021-01-19 DIAGNOSIS — N47.1 PHIMOSIS: Primary | ICD-10-CM

## 2021-01-19 NOTE — TELEPHONE ENCOUNTER
Called patient this date and scheduled procedure for 2/10/21 at [facility] at 7:30 a.m. Reviewed pre-op. Instructions this date. Patient voiced understanding. Also sent instructions thru My Chart this date. Patient will call me back directly with any

## 2021-01-26 ENCOUNTER — LABORATORY ENCOUNTER (OUTPATIENT)
Dept: LAB | Facility: HOSPITAL | Age: 71
End: 2021-01-26
Payer: COMMERCIAL

## 2021-01-26 ENCOUNTER — EKG ENCOUNTER (OUTPATIENT)
Dept: LAB | Facility: HOSPITAL | Age: 71
End: 2021-01-26
Payer: COMMERCIAL

## 2021-01-26 DIAGNOSIS — N47.1 PHIMOSIS: ICD-10-CM

## 2021-01-26 LAB
ANION GAP SERPL CALC-SCNC: 8 MMOL/L (ref 0–18)
ATRIAL RATE: 68 BPM
BASOPHILS # BLD AUTO: 0.02 X10(3) UL (ref 0–0.2)
BASOPHILS NFR BLD AUTO: 0.3 %
BUN BLD-MCNC: 12 MG/DL (ref 7–18)
BUN/CREAT SERPL: 10.3 (ref 10–20)
CALCIUM BLD-MCNC: 9.1 MG/DL (ref 8.5–10.1)
CHLORIDE SERPL-SCNC: 102 MMOL/L (ref 98–112)
CO2 SERPL-SCNC: 26 MMOL/L (ref 21–32)
CREAT BLD-MCNC: 1.17 MG/DL
DEPRECATED RDW RBC AUTO: 46.7 FL (ref 35.1–46.3)
EOSINOPHIL # BLD AUTO: 0.08 X10(3) UL (ref 0–0.7)
EOSINOPHIL NFR BLD AUTO: 1.2 %
ERYTHROCYTE [DISTWIDTH] IN BLOOD BY AUTOMATED COUNT: 12 % (ref 11–15)
GLUCOSE BLD-MCNC: 172 MG/DL (ref 70–99)
HCT VFR BLD AUTO: 44.3 %
HGB BLD-MCNC: 14.8 G/DL
IMM GRANULOCYTES # BLD AUTO: 0.01 X10(3) UL (ref 0–1)
IMM GRANULOCYTES NFR BLD: 0.2 %
LYMPHOCYTES # BLD AUTO: 1.9 X10(3) UL (ref 1–4)
LYMPHOCYTES NFR BLD AUTO: 28.7 %
MCH RBC QN AUTO: 34.7 PG (ref 26–34)
MCHC RBC AUTO-ENTMCNC: 33.4 G/DL (ref 31–37)
MCV RBC AUTO: 104 FL
MONOCYTES # BLD AUTO: 0.76 X10(3) UL (ref 0.1–1)
MONOCYTES NFR BLD AUTO: 11.5 %
NEUTROPHILS # BLD AUTO: 3.85 X10 (3) UL (ref 1.5–7.7)
NEUTROPHILS # BLD AUTO: 3.85 X10(3) UL (ref 1.5–7.7)
NEUTROPHILS NFR BLD AUTO: 58.1 %
OSMOLALITY SERPL CALC.SUM OF ELEC: 286 MOSM/KG (ref 275–295)
P AXIS: 37 DEGREES
P-R INTERVAL: 154 MS
PATIENT FASTING Y/N/NP: YES
PLATELET # BLD AUTO: 141 10(3)UL (ref 150–450)
POTASSIUM SERPL-SCNC: 3.9 MMOL/L (ref 3.5–5.1)
Q-T INTERVAL: 412 MS
QRS DURATION: 86 MS
QTC CALCULATION (BEZET): 438 MS
R AXIS: 21 DEGREES
RBC # BLD AUTO: 4.26 X10(6)UL
SODIUM SERPL-SCNC: 136 MMOL/L (ref 136–145)
T AXIS: 43 DEGREES
VENTRICULAR RATE: 68 BPM
WBC # BLD AUTO: 6.6 X10(3) UL (ref 4–11)

## 2021-01-26 PROCEDURE — 93005 ELECTROCARDIOGRAM TRACING: CPT

## 2021-01-26 PROCEDURE — 85025 COMPLETE CBC W/AUTO DIFF WBC: CPT

## 2021-01-26 PROCEDURE — 80048 BASIC METABOLIC PNL TOTAL CA: CPT

## 2021-01-26 PROCEDURE — 93010 ELECTROCARDIOGRAM REPORT: CPT | Performed by: INTERNAL MEDICINE

## 2021-01-26 PROCEDURE — 36415 COLL VENOUS BLD VENIPUNCTURE: CPT

## 2021-02-02 ENCOUNTER — HOSPITAL ENCOUNTER (OUTPATIENT)
Dept: MRI IMAGING | Facility: HOSPITAL | Age: 71
Discharge: HOME | End: 2021-02-02
Attending: INTERNAL MEDICINE
Payer: COMMERCIAL

## 2021-02-02 DIAGNOSIS — R93.89 ABNORMAL FINDING ON IMAGING: ICD-10-CM

## 2021-02-02 PROCEDURE — A9575 INJ GADOTERATE MEGLUMI 0.1ML: HCPCS

## 2021-02-02 PROCEDURE — 76376 3D RENDER W/INTRP POSTPROCES: CPT | Performed by: INTERNAL MEDICINE

## 2021-02-02 PROCEDURE — 74183 MRI ABD W/O CNTR FLWD CNTR: CPT | Performed by: INTERNAL MEDICINE

## 2021-02-06 DIAGNOSIS — Z23 NEED FOR VACCINATION: ICD-10-CM

## 2021-02-07 NOTE — H&P
Patient has persistent phimosis of the penis and requests circumcision.   Topical clobetasol has not resolved the phimosis            Current Outpatient Medications   Medication Sig Dispense Refill   • ATORVASTATIN 20 MG Oral Tab Take 1 tablet by mouth placememnt   • High blood pressure     • High cholesterol     • Hyperlipidemia     • Kidney stone     • Pure hypercholesterolemia     • Stented coronary artery     • Visual impairment       contacts and glasses            Past Surgical History:   Procedure perfusion  ABDOMEN: no masses,  tenderness, or hernia  : The penis is uncircumcised with phimosis. Urethral meatus is patent without discharge.   NEURO: grossly normal  EXTREMITIES: no cyanosis, clubbing or edema     LAB:            Lab Results   Compone The patient understands potential risks and complications of the procedure and wishes to proceed.                       The patient indicates understanding of these issues and agrees to the plan.     Keon Maldonado M.D.

## 2021-02-08 ENCOUNTER — LAB ENCOUNTER (OUTPATIENT)
Dept: LAB | Facility: HOSPITAL | Age: 71
End: 2021-02-08
Attending: UROLOGY
Payer: COMMERCIAL

## 2021-02-08 DIAGNOSIS — N47.1 PHIMOSIS: ICD-10-CM

## 2021-02-08 LAB — SARS-COV-2 RNA RESP QL NAA+PROBE: NOT DETECTED

## 2021-02-09 ENCOUNTER — ANESTHESIA EVENT (OUTPATIENT)
Dept: SURGERY | Facility: HOSPITAL | Age: 71
End: 2021-02-09
Payer: COMMERCIAL

## 2021-02-10 ENCOUNTER — TELEPHONE (OUTPATIENT)
Dept: SURGERY | Facility: CLINIC | Age: 71
End: 2021-02-10

## 2021-02-10 ENCOUNTER — HOSPITAL ENCOUNTER (OUTPATIENT)
Facility: HOSPITAL | Age: 71
Setting detail: HOSPITAL OUTPATIENT SURGERY
Discharge: HOME | End: 2021-02-10
Attending: UROLOGY | Admitting: UROLOGY
Payer: COMMERCIAL

## 2021-02-10 ENCOUNTER — ANESTHESIA (OUTPATIENT)
Dept: SURGERY | Facility: HOSPITAL | Age: 71
End: 2021-02-10
Payer: COMMERCIAL

## 2021-02-10 VITALS
OXYGEN SATURATION: 100 % | HEIGHT: 75 IN | BODY MASS INDEX: 24.03 KG/M2 | RESPIRATION RATE: 18 BRPM | SYSTOLIC BLOOD PRESSURE: 150 MMHG | TEMPERATURE: 97 F | DIASTOLIC BLOOD PRESSURE: 92 MMHG | WEIGHT: 193.31 LBS | HEART RATE: 79 BPM

## 2021-02-10 DIAGNOSIS — N47.1 PHIMOSIS: Primary | ICD-10-CM

## 2021-02-10 LAB
GLUCOSE BLD-MCNC: 185 MG/DL (ref 70–99)
GLUCOSE BLD-MCNC: 192 MG/DL (ref 70–99)

## 2021-02-10 PROCEDURE — 0VTTXZZ RESECTION OF PREPUCE, EXTERNAL APPROACH: ICD-10-PCS | Performed by: UROLOGY

## 2021-02-10 PROCEDURE — 54161 CIRCUM 28 DAYS OR OLDER: CPT | Performed by: UROLOGY

## 2021-02-10 RX ORDER — CEFAZOLIN SODIUM/WATER 2 G/20 ML
2 SYRINGE (ML) INTRAVENOUS ONCE
Status: COMPLETED | OUTPATIENT
Start: 2021-02-10 | End: 2021-02-10

## 2021-02-10 RX ORDER — SODIUM CHLORIDE, SODIUM LACTATE, POTASSIUM CHLORIDE, CALCIUM CHLORIDE 600; 310; 30; 20 MG/100ML; MG/100ML; MG/100ML; MG/100ML
INJECTION, SOLUTION INTRAVENOUS CONTINUOUS
Status: DISCONTINUED | OUTPATIENT
Start: 2021-02-10 | End: 2021-02-10

## 2021-02-10 RX ORDER — HYDROMORPHONE HYDROCHLORIDE 1 MG/ML
0.4 INJECTION, SOLUTION INTRAMUSCULAR; INTRAVENOUS; SUBCUTANEOUS EVERY 5 MIN PRN
Status: DISCONTINUED | OUTPATIENT
Start: 2021-02-10 | End: 2021-02-10

## 2021-02-10 RX ORDER — LIDOCAINE HYDROCHLORIDE 40 MG/ML
INJECTION, SOLUTION RETROBULBAR; TOPICAL AS NEEDED
Status: DISCONTINUED | OUTPATIENT
Start: 2021-02-10 | End: 2021-02-10 | Stop reason: SURG

## 2021-02-10 RX ORDER — ONDANSETRON 2 MG/ML
INJECTION INTRAMUSCULAR; INTRAVENOUS AS NEEDED
Status: DISCONTINUED | OUTPATIENT
Start: 2021-02-10 | End: 2021-02-10 | Stop reason: SURG

## 2021-02-10 RX ORDER — LIDOCAINE HYDROCHLORIDE 10 MG/ML
INJECTION, SOLUTION EPIDURAL; INFILTRATION; INTRACAUDAL; PERINEURAL AS NEEDED
Status: DISCONTINUED | OUTPATIENT
Start: 2021-02-10 | End: 2021-02-10 | Stop reason: SURG

## 2021-02-10 RX ORDER — HYDROCODONE BITARTRATE AND ACETAMINOPHEN 5; 325 MG/1; MG/1
1-2 TABLET ORAL EVERY 4 HOURS PRN
Qty: 20 TABLET | Refills: 0 | Status: SHIPPED | OUTPATIENT
Start: 2021-02-10 | End: 2021-07-28 | Stop reason: ALTCHOICE

## 2021-02-10 RX ORDER — KETOROLAC TROMETHAMINE 30 MG/ML
INJECTION, SOLUTION INTRAMUSCULAR; INTRAVENOUS AS NEEDED
Status: DISCONTINUED | OUTPATIENT
Start: 2021-02-10 | End: 2021-02-10 | Stop reason: SURG

## 2021-02-10 RX ORDER — NALOXONE HYDROCHLORIDE 0.4 MG/ML
80 INJECTION, SOLUTION INTRAMUSCULAR; INTRAVENOUS; SUBCUTANEOUS AS NEEDED
Status: DISCONTINUED | OUTPATIENT
Start: 2021-02-10 | End: 2021-02-10

## 2021-02-10 RX ORDER — DEXTROSE MONOHYDRATE 25 G/50ML
50 INJECTION, SOLUTION INTRAVENOUS
Status: DISCONTINUED | OUTPATIENT
Start: 2021-02-10 | End: 2021-02-10 | Stop reason: HOSPADM

## 2021-02-10 RX ORDER — DEXTROSE MONOHYDRATE 25 G/50ML
50 INJECTION, SOLUTION INTRAVENOUS
Status: DISCONTINUED | OUTPATIENT
Start: 2021-02-10 | End: 2021-02-10

## 2021-02-10 RX ORDER — ACETAMINOPHEN 500 MG
1000 TABLET ORAL ONCE
COMMUNITY

## 2021-02-10 RX ORDER — CEFAZOLIN SODIUM/WATER 2 G/20 ML
SYRINGE (ML) INTRAVENOUS
Status: DISCONTINUED
Start: 2021-02-10 | End: 2021-02-10

## 2021-02-10 RX ORDER — HYDROCODONE BITARTRATE AND ACETAMINOPHEN 5; 325 MG/1; MG/1
2 TABLET ORAL AS NEEDED
Status: DISCONTINUED | OUTPATIENT
Start: 2021-02-10 | End: 2021-02-10

## 2021-02-10 RX ORDER — BUPIVACAINE HYDROCHLORIDE 5 MG/ML
INJECTION, SOLUTION EPIDURAL; INTRACAUDAL AS NEEDED
Status: DISCONTINUED | OUTPATIENT
Start: 2021-02-10 | End: 2021-02-10 | Stop reason: HOSPADM

## 2021-02-10 RX ORDER — HYDROCODONE BITARTRATE AND ACETAMINOPHEN 5; 325 MG/1; MG/1
1 TABLET ORAL AS NEEDED
Status: DISCONTINUED | OUTPATIENT
Start: 2021-02-10 | End: 2021-02-10

## 2021-02-10 RX ORDER — ACETAMINOPHEN 500 MG
1000 TABLET ORAL ONCE
Status: DISCONTINUED | OUTPATIENT
Start: 2021-02-10 | End: 2021-02-10 | Stop reason: HOSPADM

## 2021-02-10 RX ORDER — ROCURONIUM BROMIDE 10 MG/ML
INJECTION, SOLUTION INTRAVENOUS AS NEEDED
Status: DISCONTINUED | OUTPATIENT
Start: 2021-02-10 | End: 2021-02-10 | Stop reason: SURG

## 2021-02-10 RX ADMIN — SODIUM CHLORIDE, SODIUM LACTATE, POTASSIUM CHLORIDE, CALCIUM CHLORIDE: 600; 310; 30; 20 INJECTION, SOLUTION INTRAVENOUS at 08:49:00

## 2021-02-10 RX ADMIN — ONDANSETRON 4 MG: 2 INJECTION INTRAMUSCULAR; INTRAVENOUS at 07:45:00

## 2021-02-10 RX ADMIN — LIDOCAINE HYDROCHLORIDE 50 MG: 10 INJECTION, SOLUTION EPIDURAL; INFILTRATION; INTRACAUDAL; PERINEURAL at 07:40:00

## 2021-02-10 RX ADMIN — KETOROLAC TROMETHAMINE 30 MG: 30 INJECTION, SOLUTION INTRAMUSCULAR; INTRAVENOUS at 07:57:00

## 2021-02-10 RX ADMIN — ROCURONIUM BROMIDE 10 MG: 10 INJECTION, SOLUTION INTRAVENOUS at 07:40:00

## 2021-02-10 RX ADMIN — CEFAZOLIN SODIUM/WATER 2 G: 2 G/20 ML SYRINGE (ML) INTRAVENOUS at 07:57:00

## 2021-02-10 RX ADMIN — LIDOCAINE HYDROCHLORIDE 2 ML: 40 INJECTION, SOLUTION RETROBULBAR; TOPICAL at 07:42:00

## 2021-02-10 NOTE — ANESTHESIA PROCEDURE NOTES
Airway  Date/Time: 2/10/2021 7:42 AM  Urgency: elective      General Information and Staff    Patient location during procedure: OR  Anesthesiologist: Sivakumar Angel MD  Performed: anesthesiologist     Indications and Patient Condition  Indications for airw

## 2021-02-10 NOTE — ANESTHESIA POSTPROCEDURE EVALUATION
[address] [address] [place] [address] Patient Status:  Hospital Outpatient Surgery   Age/Gender 72year old male MRN [id number]   Location [address] Attending Mandi Mac MD   Hosp Day # 0 PCP Janyce Krabbe, MD       A

## 2021-02-10 NOTE — ANESTHESIA PREPROCEDURE EVALUATION
PRE-OP EVALUATION    Patient Name: Jayme Kanner    Pre-op Diagnosis: Phimosis [N47.1]    Procedure(s):  Circumcision    Surgeon(s) and Role:     * Lizbeth Zuniga MD - Primary    Pre-op vitals reviewed. Body mass index is 23.75 kg/m².     Jordyn Guy equivalent per week      Frequency: 4 or more times a week      Drinks per session: 1 or 2      Binge frequency: Never      Comment: Cage done 11/23/20      Drug use: No     Available pre-op labs reviewed.   Lab Results   Component Value Date    WBC 6.6 01/

## 2021-02-10 NOTE — INTERVAL H&P NOTE
Pre-op Diagnosis: Phimosis [N47.1]    The above referenced H&P was reviewed by Zak Doss MD on 2/10/2021, the patient was examined and no significant changes have occurred in the patient's condition since the H&P was performed.   I discussed with joanie

## 2021-02-10 NOTE — OPERATIVE REPORT
Operative Note    Patient Name: Kamari Cee    Date of Procedure: 2/10/2021    Preoperative Diagnosis: Phimosis [N47.1]    Postoperative Diagnosis: Phimosis [N47.1]    Primary Surgeon: Marilee Harvey. Trista Monroe M.D.      Procedure Performed: Circumcision given the usual postoperative instructions for ice and elevation and limited activities and advised to return to the office in 2 weeks for follow-up examination. Anisha Mathur.  Pastor Alfredo M.D.

## 2021-02-11 NOTE — PROGRESS NOTES
Date: 2021    To: Taylor Argueta  : 1950    I hope this letter finds you doing well. I am writing to inform you of the following: The results of your recent MRI: No evidence for acute pancreatitis.  Stable main pancreatic ductal dila

## 2021-02-13 ENCOUNTER — IMMUNIZATION (OUTPATIENT)
Dept: LAB | Age: 71
End: 2021-02-13
Attending: HOSPITALIST
Payer: COMMERCIAL

## 2021-02-13 DIAGNOSIS — Z23 NEED FOR VACCINATION: Primary | ICD-10-CM

## 2021-02-13 PROCEDURE — 0001A SARSCOV2 VAC 30MCG/0.3ML IM: CPT

## 2021-02-15 ENCOUNTER — TELEPHONE (OUTPATIENT)
Dept: SURGERY | Facility: CLINIC | Age: 71
End: 2021-02-15

## 2021-02-15 NOTE — TELEPHONE ENCOUNTER
Spoke with patient this morning to schedule two week follow up. Patient advised that he had to change tire last week and it made everything swell up like size of football. He did ice and it reduced swelling to about 80% normal again. There is no pain.   Sh

## 2021-02-16 ENCOUNTER — OFFICE VISIT (OUTPATIENT)
Dept: SURGERY | Facility: CLINIC | Age: 71
End: 2021-02-16

## 2021-02-16 VITALS — HEART RATE: 64 BPM | SYSTOLIC BLOOD PRESSURE: 111 MMHG | DIASTOLIC BLOOD PRESSURE: 73 MMHG | TEMPERATURE: 97 F

## 2021-02-16 DIAGNOSIS — N47.1 PHIMOSIS: Primary | ICD-10-CM

## 2021-02-16 PROCEDURE — 99024 POSTOP FOLLOW-UP VISIT: CPT | Performed by: UROLOGY

## 2021-02-16 PROCEDURE — 3074F SYST BP LT 130 MM HG: CPT | Performed by: UROLOGY

## 2021-02-16 PROCEDURE — 3078F DIAST BP <80 MM HG: CPT | Performed by: UROLOGY

## 2021-02-16 NOTE — PROGRESS NOTES
Rooming Clinician:     Jerardo Benjamin is a 72year old male. Patient presents with: Follow - Up: S/P circumcision on 2/10; swelling scrotal area after lifting tires        HPI:     Notes some swelling of the penis after changing a flat tire.   No swelli reading without diagnosis of hypertension    • Encounter for screening fecal occult blood testing 5/17/17    Negative for Occult Blood   • Essential hypertension    • Heart attack ([address]     w/stent placememnt   • High blood pressure    • High cholesterol developed, well nourished,in no apparent distress  SKIN: no rashes,no suspicious lesions  HEENT: atraumatic, normocephalic,ears and throat are clear  NECK: supple  LUNGS: normal respiratory motion without distress  CARDIO: normal peripheral perfusion  ABDO

## 2021-02-19 NOTE — TELEPHONE ENCOUNTER
Follow up examination in the office in about 2 weeks Referral was faxed to [facility].

## 2021-03-06 ENCOUNTER — IMMUNIZATION (OUTPATIENT)
Dept: LAB | Age: 71
End: 2021-03-06
Attending: HOSPITALIST
Payer: COMMERCIAL

## 2021-03-06 DIAGNOSIS — Z23 NEED FOR VACCINATION: Primary | ICD-10-CM

## 2021-03-06 PROCEDURE — 0002A SARSCOV2 VAC 30MCG/0.3ML IM: CPT

## 2021-03-11 ENCOUNTER — OFFICE VISIT (OUTPATIENT)
Dept: SURGERY | Facility: CLINIC | Age: 71
End: 2021-03-11

## 2021-03-11 VITALS — TEMPERATURE: 97 F | DIASTOLIC BLOOD PRESSURE: 74 MMHG | HEART RATE: 80 BPM | SYSTOLIC BLOOD PRESSURE: 113 MMHG

## 2021-03-11 DIAGNOSIS — N47.1 PHIMOSIS: Primary | ICD-10-CM

## 2021-03-11 DIAGNOSIS — N40.0 ENLARGED PROSTATE: ICD-10-CM

## 2021-03-11 PROCEDURE — 3078F DIAST BP <80 MM HG: CPT | Performed by: UROLOGY

## 2021-03-11 PROCEDURE — 99024 POSTOP FOLLOW-UP VISIT: CPT | Performed by: UROLOGY

## 2021-03-11 PROCEDURE — 3074F SYST BP LT 130 MM HG: CPT | Performed by: UROLOGY

## 2021-03-11 RX ORDER — EMPAGLIFLOZIN 10 MG/1
TABLET, FILM COATED ORAL
Qty: 90 TABLET | Refills: 0 | Status: SHIPPED | OUTPATIENT
Start: 2021-03-11 | End: 2021-06-15

## 2021-03-11 RX ORDER — ATENOLOL 50 MG/1
TABLET ORAL
Qty: 90 TABLET | Refills: 0 | Status: SHIPPED | OUTPATIENT
Start: 2021-03-11 | End: 2021-06-15

## 2021-03-11 NOTE — PROGRESS NOTES
Rooming Clinician:     Lori Burns is a 72year old male. Patient presents with:  Surgical Followup: patient states he is feeling good        HPI:     Well after circumcision.   Minimal edema ventrally with some induration    Current [facility] occult blood testing 5/17/17    Negative for Occult Blood   • Essential hypertension    • Heart attack ([address]     w/stent placememnt   • High blood pressure    • High cholesterol    • Hyperlipidemia    • Kidney stone    • Pure hypercholesterolemia    • Sten distress  CARDIO: normal peripheral perfusion  ABDOMEN: no masses,  tenderness, or hernia  : Circumcision site is healing.   Minimum induration ventrally is resolving  BACK: no CVA tenderness  NEURO: grossly normal  EXTREMITIES: no cyanosis, clubbing or e

## 2021-03-11 NOTE — TELEPHONE ENCOUNTER
Last VISIT 11/23/20 JL Left Hand Laceration     Last REFILL 11/30/20 Jardiance 90T 0R, Atenolol 90T 0R     Last LABS 1/26/21 BMP, CBC 8/27/20 MA/Creat, HgA1c, Lipid, CMP, CBC      Future Appointments   Date Time Provider Linda Maddox   3/11/2022 10:30

## 2021-03-12 NOTE — TELEPHONE ENCOUNTER
Future Appointments   Date Time Provider Linda Maddox   5/12/2021 11:00 AM Oneil Wheatley MD [facility]

## 2021-03-29 RX ORDER — GLIPIZIDE 5 MG/1
TABLET, FILM COATED, EXTENDED RELEASE ORAL
Qty: 90 TABLET | Refills: 0 | Status: SHIPPED | OUTPATIENT
Start: 2021-03-29 | End: 2021-06-15

## 2021-03-29 NOTE — TELEPHONE ENCOUNTER
Last VISIT 11/23/20 JL Laceration L Hand    Last REFILL 11/30/20 Glipizide  90T 0R    Last LABS 1/26/21 CBC, BMP, 8/27/20 MA/Creat, HgA1c, Lipid, CMP, CBC    Future Appointments   Date Time Provider Linda Maddox   5/12/2021 11:00 AM Wild Nelson,

## 2021-04-13 RX ORDER — AMLODIPINE BESYLATE 2.5 MG/1
TABLET ORAL
Qty: 90 TABLET | Refills: 0 | Status: SHIPPED | OUTPATIENT
Start: 2021-04-13 | End: 2021-07-16

## 2021-04-15 ENCOUNTER — TELEPHONE (OUTPATIENT)
Dept: INTERNAL MEDICINE CLINIC | Facility: CLINIC | Age: 71
End: 2021-04-15

## 2021-04-15 DIAGNOSIS — K22.70 BARRETT'S ESOPHAGUS WITHOUT DYSPLASIA: ICD-10-CM

## 2021-04-15 DIAGNOSIS — K21.9 GASTROESOPHAGEAL REFLUX DISEASE WITHOUT ESOPHAGITIS: ICD-10-CM

## 2021-04-15 DIAGNOSIS — K86.1 OTHER CHRONIC PANCREATITIS (HCC): Primary | ICD-10-CM

## 2021-04-15 NOTE — TELEPHONE ENCOUNTER
Future Appointments   Date Time Provider Linda Varsha   5/12/2021 11:00 AM Claudia Slaughter MD [facility]

## 2021-04-15 NOTE — TELEPHONE ENCOUNTER
Specialty: Krunal Dennis    Full Name of Specialist:Dr. Cely Singh    Date of Appointment:Not scheduled as yet    Reason for the Appointment (be specific): Annual yearly f/u    Has the patient seen a provider in our office for stated problem?:yes    Is this req

## 2021-05-12 ENCOUNTER — OFFICE VISIT (OUTPATIENT)
Dept: INTERNAL MEDICINE CLINIC | Facility: CLINIC | Age: 71
End: 2021-05-12

## 2021-05-12 ENCOUNTER — TELEPHONE (OUTPATIENT)
Dept: INTERNAL MEDICINE CLINIC | Facility: CLINIC | Age: 71
End: 2021-05-12

## 2021-05-12 VITALS
OXYGEN SATURATION: 98 % | WEIGHT: 195 LBS | BODY MASS INDEX: 24.25 KG/M2 | DIASTOLIC BLOOD PRESSURE: 84 MMHG | TEMPERATURE: 98 F | HEIGHT: 75 IN | RESPIRATION RATE: 16 BRPM | SYSTOLIC BLOOD PRESSURE: 138 MMHG | HEART RATE: 71 BPM

## 2021-05-12 DIAGNOSIS — E78.00 PURE HYPERCHOLESTEROLEMIA: Primary | ICD-10-CM

## 2021-05-12 DIAGNOSIS — Z23 NEED FOR SHINGLES VACCINE: Primary | ICD-10-CM

## 2021-05-12 DIAGNOSIS — M79.2 NEURALGIA: ICD-10-CM

## 2021-05-12 DIAGNOSIS — E11.9 TYPE 2 DIABETES MELLITUS WITHOUT COMPLICATION, WITHOUT LONG-TERM CURRENT USE OF INSULIN (HCC): ICD-10-CM

## 2021-05-12 DIAGNOSIS — I10 ESSENTIAL HYPERTENSION WITH GOAL BLOOD PRESSURE LESS THAN 130/80: ICD-10-CM

## 2021-05-12 PROCEDURE — 90750 HZV VACC RECOMBINANT IM: CPT | Performed by: INTERNAL MEDICINE

## 2021-05-12 PROCEDURE — 99213 OFFICE O/P EST LOW 20 MIN: CPT | Performed by: INTERNAL MEDICINE

## 2021-05-12 PROCEDURE — 83036 HEMOGLOBIN GLYCOSYLATED A1C: CPT | Performed by: INTERNAL MEDICINE

## 2021-05-12 PROCEDURE — 90471 IMMUNIZATION ADMIN: CPT | Performed by: INTERNAL MEDICINE

## 2021-05-12 PROCEDURE — 3008F BODY MASS INDEX DOCD: CPT | Performed by: INTERNAL MEDICINE

## 2021-05-12 PROCEDURE — 3079F DIAST BP 80-89 MM HG: CPT | Performed by: INTERNAL MEDICINE

## 2021-05-12 PROCEDURE — 3044F HG A1C LEVEL LT 7.0%: CPT | Performed by: INTERNAL MEDICINE

## 2021-05-12 PROCEDURE — 3075F SYST BP GE 130 - 139MM HG: CPT | Performed by: INTERNAL MEDICINE

## 2021-05-12 NOTE — PROGRESS NOTES
HPI/Subjective:   Patient ID: Stacy Can is a 72year old male.     HPI  Here for dm, co daily bilateral foot burning, plantar, mild but freq, htn, hyperchol, dm, a1c 6.3 today, otherwise feels well  /84 (BP Location: Right arm)   Pulse 71   Tem DAYS (Patient not taking: Reported on 5/12/2021 ) 15 g 0     Allergies:No Known Allergies    Objective:   Physical Exam  Eyes:      Pupils: Pupils are equal, round, and reactive to light.    Cardiovascular:      Rate and Rhythm: Normal rate and regular rhyt

## 2021-05-12 NOTE — TELEPHONE ENCOUNTER
Zoster Vaccine Recombinant Adjuvanted (Shingrix) 5/12/2021     Ok for second injection, please advise.

## 2021-05-12 NOTE — TELEPHONE ENCOUNTER
Future Appointments   Date Time Provider Linda Varsha   7/16/2021  1:40 PM Lianne Torres MD [facility]     Place orders for 2nd Shingles

## 2021-05-17 RX ORDER — METFORMIN HYDROCHLORIDE 500 MG/1
TABLET, EXTENDED RELEASE ORAL
Qty: 360 TABLET | Refills: 1 | Status: SHIPPED | OUTPATIENT
Start: 2021-05-17 | End: 2021-12-13

## 2021-05-19 ENCOUNTER — OFFICE VISIT (OUTPATIENT)
Dept: ELECTROPHYSIOLOGY | Facility: HOSPITAL | Age: 71
End: 2021-05-19
Attending: INTERNAL MEDICINE
Payer: COMMERCIAL

## 2021-05-19 DIAGNOSIS — G62.9 SENSORY MOTOR NEUROPATHY: Primary | ICD-10-CM

## 2021-05-19 DIAGNOSIS — M79.2 NEURALGIA: ICD-10-CM

## 2021-05-19 PROCEDURE — 95909 NRV CNDJ TST 5-6 STUDIES: CPT | Performed by: OTHER

## 2021-05-19 PROCEDURE — 95886 MUSC TEST DONE W/N TEST COMP: CPT | Performed by: OTHER

## 2021-05-20 NOTE — PROCEDURES
[facility], [address] [place] [address]      PATIENT'S NAME: Katy Xavier   REFERRING PHYSICIAN: Lani Borja M.D.    PATIENT ACCOUNT #: [de-identified] LOCATION: [facility]   MEDICAL RECORD #: [id number] DATE OF BIRTH:

## 2021-05-25 RX ORDER — ATORVASTATIN CALCIUM 20 MG/1
TABLET, FILM COATED ORAL
Qty: 90 TABLET | Refills: 0 | Status: SHIPPED | OUTPATIENT
Start: 2021-05-25 | End: 2021-08-13

## 2021-06-15 RX ORDER — ATENOLOL 50 MG/1
TABLET ORAL
Qty: 90 TABLET | Refills: 0 | Status: SHIPPED | OUTPATIENT
Start: 2021-06-15 | End: 2021-09-14

## 2021-06-15 RX ORDER — GLIPIZIDE 5 MG/1
TABLET, FILM COATED, EXTENDED RELEASE ORAL
Qty: 90 TABLET | Refills: 0 | Status: SHIPPED | OUTPATIENT
Start: 2021-06-15 | End: 2021-09-14

## 2021-06-15 RX ORDER — EMPAGLIFLOZIN 10 MG/1
TABLET, FILM COATED ORAL
Qty: 90 TABLET | Refills: 0 | Status: SHIPPED | OUTPATIENT
Start: 2021-06-15 | End: 2021-09-14

## 2021-06-23 ENCOUNTER — TELEPHONE (OUTPATIENT)
Dept: INTERNAL MEDICINE CLINIC | Facility: CLINIC | Age: 71
End: 2021-06-23

## 2021-06-23 NOTE — TELEPHONE ENCOUNTER
Diabetic eye exam received. Placed in [address] [facility] bin for review.

## 2021-07-16 RX ORDER — AMLODIPINE BESYLATE 2.5 MG/1
TABLET ORAL
Qty: 90 TABLET | Refills: 0 | Status: SHIPPED | OUTPATIENT
Start: 2021-07-16 | End: 2021-10-25

## 2021-07-16 NOTE — TELEPHONE ENCOUNTER
Last visit- 05/12/2021    Last refill-  04/13/2021 amlodipine besylate 2.5mg QTY90 0R    Last labs-  01/26/2021 cbc, bmp  Future Appointments   Date Time Provider Linda Varsha   7/28/2021  2:00 PM Magali Govea MD [facility]   3/11/2022

## 2021-07-23 RX ORDER — SILDENAFIL 50 MG/1
TABLET, FILM COATED ORAL
Qty: 8 TABLET | Refills: 3 | Status: SHIPPED | OUTPATIENT
Start: 2021-07-23 | End: 2021-12-14

## 2021-07-23 NOTE — TELEPHONE ENCOUNTER
Last VISIT 5/12/21 JL DM f/u    Last REFILL sildenafil 50mg #8 3R 3/4/20    Last LABS A1c 5/12/21    Future Appointments   Date Time Provider Linda Maddox   7/28/2021  2:00 PM Lianne Torres MD [facility]   3/11/2022 10:30 AM Yunior Ron

## 2021-07-28 ENCOUNTER — OFFICE VISIT (OUTPATIENT)
Dept: INTERNAL MEDICINE CLINIC | Facility: CLINIC | Age: 71
End: 2021-07-28

## 2021-07-28 VITALS
BODY MASS INDEX: 24.37 KG/M2 | RESPIRATION RATE: 16 BRPM | SYSTOLIC BLOOD PRESSURE: 124 MMHG | TEMPERATURE: 99 F | HEIGHT: 75 IN | WEIGHT: 196 LBS | DIASTOLIC BLOOD PRESSURE: 60 MMHG | HEART RATE: 70 BPM | OXYGEN SATURATION: 98 %

## 2021-07-28 DIAGNOSIS — I10 ESSENTIAL HYPERTENSION WITH GOAL BLOOD PRESSURE LESS THAN 130/80: Primary | ICD-10-CM

## 2021-07-28 PROCEDURE — 90471 IMMUNIZATION ADMIN: CPT | Performed by: INTERNAL MEDICINE

## 2021-07-28 PROCEDURE — 3078F DIAST BP <80 MM HG: CPT | Performed by: INTERNAL MEDICINE

## 2021-07-28 PROCEDURE — 99212 OFFICE O/P EST SF 10 MIN: CPT | Performed by: INTERNAL MEDICINE

## 2021-07-28 PROCEDURE — 3074F SYST BP LT 130 MM HG: CPT | Performed by: INTERNAL MEDICINE

## 2021-07-28 PROCEDURE — 3008F BODY MASS INDEX DOCD: CPT | Performed by: INTERNAL MEDICINE

## 2021-07-28 PROCEDURE — 90750 HZV VACC RECOMBINANT IM: CPT | Performed by: INTERNAL MEDICINE

## 2021-07-28 RX ORDER — ASPIRIN 81 MG/1
81 TABLET ORAL DAILY
COMMUNITY

## 2021-08-01 NOTE — PROGRESS NOTES
HPI/Subjective:   Patient ID: Alma Cedillo is a 80year old male.     HPI  Here for bp fu and vaccines, feels well  /60   Pulse 70   Temp 98.6 °F (37 °C)   Resp 16   Ht 6' 3\" (1.905 m)   Wt 196 lb (88.9 kg)   SpO2 98%   BMI 24.50 kg/m²     Histor Plan:   Essential hypertension with goal blood pressure less than 130/80  (primary encounter diagnosis)-bp ok, cont meds, fu in 3 months, shingrix given    Orders Placed This Encounter      Zoster Recombinant Adjuvanted [Shingrix -Shingles] ([id number])      Me

## 2021-08-13 RX ORDER — ATORVASTATIN CALCIUM 20 MG/1
TABLET, FILM COATED ORAL
Qty: 90 TABLET | Refills: 0 | Status: SHIPPED | OUTPATIENT
Start: 2021-08-13 | End: 2021-11-08

## 2021-09-14 RX ORDER — GLIPIZIDE 5 MG/1
TABLET, FILM COATED, EXTENDED RELEASE ORAL
Qty: 90 TABLET | Refills: 1 | Status: SHIPPED | OUTPATIENT
Start: 2021-09-14

## 2021-09-14 RX ORDER — ATENOLOL 50 MG/1
TABLET ORAL
Qty: 90 TABLET | Refills: 0 | Status: SHIPPED | OUTPATIENT
Start: 2021-09-14 | End: 2021-11-22

## 2021-09-14 RX ORDER — EMPAGLIFLOZIN 10 MG/1
TABLET, FILM COATED ORAL
Qty: 90 TABLET | Refills: 0 | Status: SHIPPED | OUTPATIENT
Start: 2021-09-14 | End: 2021-11-22

## 2021-10-25 ENCOUNTER — TELEPHONE (OUTPATIENT)
Dept: INTERNAL MEDICINE CLINIC | Facility: CLINIC | Age: 71
End: 2021-10-25

## 2021-10-25 RX ORDER — AMLODIPINE BESYLATE 2.5 MG/1
TABLET ORAL
Qty: 90 TABLET | Refills: 0 | Status: SHIPPED | OUTPATIENT
Start: 2021-10-25

## 2021-10-25 NOTE — TELEPHONE ENCOUNTER
Pt stated both of his feet are swollen and tingly for a month and half. Pt added they are red and turning blue. Pt scheduled on below with CB.   High TE please advise    Future Appointments   Date Time Provider Linda Maddox   10/27/2021  2:20 PM Quintin Singer PA-C [facility]

## 2021-10-25 NOTE — TELEPHONE ENCOUNTER
Patient states for about 1 1/2 months bilateral bottoms of feet have had pins and needles, no pain, last week or so his feet and ankles have been swelling, doesn't go down overnight. Pt denies difference in temp, sob, cp. Pt is diabetic, last A1c=6.3 on 5/12/21. Pt has scheduled an appt with CB for 10/27/21. ER warnings given. Pt verbalizes understanding. To CB.

## 2021-10-27 ENCOUNTER — OFFICE VISIT (OUTPATIENT)
Dept: INTERNAL MEDICINE CLINIC | Facility: CLINIC | Age: 71
End: 2021-10-27

## 2021-10-27 VITALS
WEIGHT: 197 LBS | BODY MASS INDEX: 24.49 KG/M2 | SYSTOLIC BLOOD PRESSURE: 130 MMHG | TEMPERATURE: 98 F | HEIGHT: 75 IN | HEART RATE: 67 BPM | DIASTOLIC BLOOD PRESSURE: 76 MMHG | RESPIRATION RATE: 16 BRPM

## 2021-10-27 DIAGNOSIS — E11.9 TYPE 2 DIABETES MELLITUS WITHOUT COMPLICATION, WITHOUT LONG-TERM CURRENT USE OF INSULIN (HCC): ICD-10-CM

## 2021-10-27 DIAGNOSIS — G62.9 NEUROPATHY: Primary | ICD-10-CM

## 2021-10-27 DIAGNOSIS — Z12.5 PROSTATE CANCER SCREENING: ICD-10-CM

## 2021-10-27 DIAGNOSIS — Z12.11 COLON CANCER SCREENING: ICD-10-CM

## 2021-10-27 DIAGNOSIS — Z23 NEED FOR INFLUENZA VACCINATION: ICD-10-CM

## 2021-10-27 DIAGNOSIS — I25.10 CORONARY ARTERY DISEASE INVOLVING NATIVE HEART WITHOUT ANGINA PECTORIS, UNSPECIFIED VESSEL OR LESION TYPE: ICD-10-CM

## 2021-10-27 DIAGNOSIS — E78.00 PURE HYPERCHOLESTEROLEMIA: ICD-10-CM

## 2021-10-27 DIAGNOSIS — R60.0 BILATERAL LOWER EXTREMITY EDEMA: ICD-10-CM

## 2021-10-27 PROCEDURE — 99214 OFFICE O/P EST MOD 30 MIN: CPT | Performed by: PHYSICIAN ASSISTANT

## 2021-10-27 PROCEDURE — 3075F SYST BP GE 130 - 139MM HG: CPT | Performed by: PHYSICIAN ASSISTANT

## 2021-10-27 PROCEDURE — 90471 IMMUNIZATION ADMIN: CPT | Performed by: PHYSICIAN ASSISTANT

## 2021-10-27 PROCEDURE — 90662 IIV NO PRSV INCREASED AG IM: CPT | Performed by: PHYSICIAN ASSISTANT

## 2021-10-27 PROCEDURE — 3078F DIAST BP <80 MM HG: CPT | Performed by: PHYSICIAN ASSISTANT

## 2021-10-27 PROCEDURE — 3008F BODY MASS INDEX DOCD: CPT | Performed by: PHYSICIAN ASSISTANT

## 2021-10-27 NOTE — PROGRESS NOTES
Patient presents with: Foot Pain: DD Rm 2, Bilateral foot pain x 1.5 M, Swelling, Tingles, Pins and Needles      HPI:  Pt presents with c/o swelling in his legs as well as sharp pins and needles X 6 weeks.   Pt has known CAD with h/o PTCA and stent placeme JARDIANCE 10 MG Oral Tab Take 1 tablet by mouth once daily 90 tablet 0   • GLIPIZIDE 5 MG Oral Tablet 24 Hr Take 1 tablet by mouth once daily 90 tablet 1   • ATENOLOL 50 MG Oral Tab Take 1 tablet by mouth once daily 90 tablet 0   • ATORVASTATIN 20 MG Oral Coronary artery disease involving native heart without angina pectoris, unspecified vessel or lesion type - Clinically stable. Bilateral lower extremity edema - Check urine Micro/Cr. Check Echo. Colon cancer screening - FIT card given to pt today.

## 2021-11-02 ENCOUNTER — LAB ENCOUNTER (OUTPATIENT)
Dept: LAB | Age: 71
End: 2021-11-02
Attending: PHYSICIAN ASSISTANT
Payer: COMMERCIAL

## 2021-11-02 DIAGNOSIS — G62.9 NEUROPATHY: ICD-10-CM

## 2021-11-02 DIAGNOSIS — Z12.5 PROSTATE CANCER SCREENING: ICD-10-CM

## 2021-11-02 DIAGNOSIS — R60.0 BILATERAL LOWER EXTREMITY EDEMA: ICD-10-CM

## 2021-11-02 DIAGNOSIS — E11.9 TYPE 2 DIABETES MELLITUS WITHOUT COMPLICATION, WITHOUT LONG-TERM CURRENT USE OF INSULIN (HCC): ICD-10-CM

## 2021-11-02 PROCEDURE — 83036 HEMOGLOBIN GLYCOSYLATED A1C: CPT

## 2021-11-02 PROCEDURE — 82570 ASSAY OF URINE CREATININE: CPT

## 2021-11-02 PROCEDURE — 82607 VITAMIN B-12: CPT

## 2021-11-02 PROCEDURE — 3044F HG A1C LEVEL LT 7.0%: CPT | Performed by: INTERNAL MEDICINE

## 2021-11-02 PROCEDURE — 80061 LIPID PANEL: CPT

## 2021-11-02 PROCEDURE — 84443 ASSAY THYROID STIM HORMONE: CPT

## 2021-11-02 PROCEDURE — 36415 COLL VENOUS BLD VENIPUNCTURE: CPT

## 2021-11-02 PROCEDURE — 80053 COMPREHEN METABOLIC PANEL: CPT

## 2021-11-02 PROCEDURE — 82043 UR ALBUMIN QUANTITATIVE: CPT

## 2021-11-02 PROCEDURE — 85025 COMPLETE CBC W/AUTO DIFF WBC: CPT

## 2021-11-02 PROCEDURE — 3061F NEG MICROALBUMINURIA REV: CPT | Performed by: INTERNAL MEDICINE

## 2021-11-03 NOTE — PROGRESS NOTES
DM is well controlled. Chol well controlled. Normal PSA. CBC/plts are stable. B12 is low. He will need to start injections, Cyanocobalamin 1000 mcg IM weekly X 4 weeks then monthly thereafter. Pt should repeat B12 level in 6 mos.   I entered order for

## 2021-11-04 ENCOUNTER — TELEPHONE (OUTPATIENT)
Dept: INTERNAL MEDICINE CLINIC | Facility: CLINIC | Age: 71
End: 2021-11-04

## 2021-11-08 ENCOUNTER — NURSE ONLY (OUTPATIENT)
Dept: INTERNAL MEDICINE CLINIC | Facility: CLINIC | Age: 71
End: 2021-11-08

## 2021-11-08 DIAGNOSIS — E53.8 B12 DEFICIENCY: Primary | ICD-10-CM

## 2021-11-08 PROCEDURE — 96372 THER/PROPH/DIAG INJ SC/IM: CPT | Performed by: PHYSICIAN ASSISTANT

## 2021-11-08 RX ORDER — ATORVASTATIN CALCIUM 20 MG/1
TABLET, FILM COATED ORAL
Qty: 90 TABLET | Refills: 0 | Status: SHIPPED | OUTPATIENT
Start: 2021-11-08

## 2021-11-08 RX ORDER — CYANOCOBALAMIN 1000 UG/ML
1000 INJECTION INTRAMUSCULAR; SUBCUTANEOUS ONCE
Status: COMPLETED | OUTPATIENT
Start: 2021-11-08 | End: 2021-11-08

## 2021-11-08 RX ADMIN — CYANOCOBALAMIN 1000 MCG: 1000 INJECTION INTRAMUSCULAR; SUBCUTANEOUS at 09:18:00

## 2021-11-08 NOTE — TELEPHONE ENCOUNTER
PASSED per protocol, refill sent.   Last PE 9.4.20-OVERDUE, routed to front to schedule PE   Future Appointments   Date Time Provider Linda Maddox   11/15/2021  8:45 AM [facility] NURSE [facility]   11/22/2021  8:45 AM [facility] NURSE [facility] EM

## 2021-11-14 ENCOUNTER — TELEMEDICINE (OUTPATIENT)
Dept: TELEHEALTH | Age: 71
End: 2021-11-14

## 2021-11-14 DIAGNOSIS — Z20.822 ENCOUNTER BY TELEHEALTH FOR SUSPECTED COVID-19: Primary | ICD-10-CM

## 2021-11-14 DIAGNOSIS — Z20.822 CLOSE EXPOSURE TO COVID-19 VIRUS: ICD-10-CM

## 2021-11-14 PROCEDURE — 99213 OFFICE O/P EST LOW 20 MIN: CPT | Performed by: PHYSICIAN ASSISTANT

## 2021-11-14 NOTE — PATIENT INSTRUCTIONS
Coronavirus Disease 2019 (COVID-19)     [facility] [id number] is committed to the safety and well-being of our patients, members, employees, and communities.  As concerns arise about the new strain of coronavirus that causes COVID-19, [facility] [address] exposure  • After day 7 from date of last exposure with a negative test result (test must occur on day 5 or later)  After stopping quarantine, you should  • Watch for symptoms until 14 days after exposure.   • If you have symptoms, immediately self-isolate Care     If you are awaiting test results or are confirmed positive for COVID -19, and your symptoms worsen at home with symptoms such as: extreme weakness, difficult breathing, or unrelenting fevers greater than 100.4 degrees Fahrenheit, you should contac Follow-up  If you are diagnosed with COVID, refrain from exercise until approved by your primary care provider. Please call your primary care provider within 2 days of your discharge to arrange for a telehealth follow-up.  CDC does not recommend repeat test Control & Prevention (CDC)  10 things you can do to manage your health at home, Elen.nl. pdf  [employer] Retrieved March 17, 2021, from https://health.[facility]/coronavirus/covid-19-information/covid-19-long-haulers. html  Long-term effects of covid-19. (n.d.).  Retrieved May 11, 2021, from [employer]

## 2021-11-14 NOTE — PROGRESS NOTES
Virtual/Telephone Check-In    Kashmir Fito verbally consents to a Virtual/Telephone Check-In service on 11/14/21. Patient has been referred to the [facility] website at [email or web address] to review the yearly Consent to Treat document.   Patient unders COMPLAINT:   Patient presents with:  Covid-19 Test: wife positive      HPI:   Dago Hahn is a 80year old male who presents for a video visit. Patient reports close exposure to wife who tested positive for COVID. She was tested due to exposure.  She cholesterol    • Hyperlipidemia    • Kidney stone    • Pure hypercholesterolemia    • Sleep disturbance my wife   • Stented coronary artery    • Visual impairment     contacts and glasses      Past Surgical History:   Procedure Laterality Date   • ANGIOPLA 14 days past last exposure    See patient Instructions      The patient indicates understanding of these issues and agrees to the plan. The patient is asked to return if sx's persist or worsen.     Kamari Cee understands video visit evaluation is not

## 2021-11-15 ENCOUNTER — LAB ENCOUNTER (OUTPATIENT)
Dept: LAB | Facility: HOSPITAL | Age: 71
End: 2021-11-15
Attending: PHYSICIAN ASSISTANT
Payer: COMMERCIAL

## 2021-11-15 ENCOUNTER — NURSE ONLY (OUTPATIENT)
Dept: INTERNAL MEDICINE CLINIC | Facility: CLINIC | Age: 71
End: 2021-11-15

## 2021-11-15 DIAGNOSIS — E53.8 B12 DEFICIENCY: Primary | ICD-10-CM

## 2021-11-15 DIAGNOSIS — Z20.822 CLOSE EXPOSURE TO COVID-19 VIRUS: ICD-10-CM

## 2021-11-15 DIAGNOSIS — Z20.822 ENCOUNTER BY TELEHEALTH FOR SUSPECTED COVID-19: ICD-10-CM

## 2021-11-15 PROCEDURE — 96372 THER/PROPH/DIAG INJ SC/IM: CPT | Performed by: INTERNAL MEDICINE

## 2021-11-15 RX ORDER — CYANOCOBALAMIN 1000 UG/ML
1000 INJECTION INTRAMUSCULAR; SUBCUTANEOUS ONCE
Status: COMPLETED | OUTPATIENT
Start: 2021-11-15 | End: 2021-11-15

## 2021-11-15 RX ADMIN — CYANOCOBALAMIN 1000 MCG: 1000 INJECTION INTRAMUSCULAR; SUBCUTANEOUS at 08:57:00

## 2021-11-16 ENCOUNTER — TELEPHONE (OUTPATIENT)
Dept: INTERNAL MEDICINE CLINIC | Facility: CLINIC | Age: 71
End: 2021-11-16

## 2021-11-16 NOTE — TELEPHONE ENCOUNTER
Pt calling back to check status on covid test.  Results still pending. Pt would like a call back when results are entered.  Please advise

## 2021-11-17 NOTE — TELEPHONE ENCOUNTER
Written by Ariadna Yao PA-C on 11/16/2021  6:30 PM CST View Full Comments  Seen by patient Randy Velez on 11/17/2021  9:17 AM

## 2021-11-22 ENCOUNTER — TELEPHONE (OUTPATIENT)
Dept: INTERNAL MEDICINE CLINIC | Facility: CLINIC | Age: 71
End: 2021-11-22

## 2021-11-22 RX ORDER — EMPAGLIFLOZIN 10 MG/1
TABLET, FILM COATED ORAL
Qty: 90 TABLET | Refills: 0 | Status: SHIPPED | OUTPATIENT
Start: 2021-11-22 | End: 2022-01-08

## 2021-11-22 RX ORDER — ATENOLOL 50 MG/1
TABLET ORAL
Qty: 90 TABLET | Refills: 0 | Status: SHIPPED | OUTPATIENT
Start: 2021-11-22 | End: 2022-01-08

## 2021-11-22 NOTE — TELEPHONE ENCOUNTER
Pt is requesting a letter/note stating he is healthy enough to go snorkeling. Pt needs it prior to Friday. Please send it through [email or web address].

## 2021-11-22 NOTE — TELEPHONE ENCOUNTER
Routing to provide. LOV with JL on 7/28/21. Saw CB on 10/27 for foot pain. Is this something you can do? Please advise. Thanks!

## 2021-11-24 ENCOUNTER — OFFICE VISIT (OUTPATIENT)
Dept: INTERNAL MEDICINE CLINIC | Facility: CLINIC | Age: 71
End: 2021-11-24

## 2021-11-24 VITALS
SYSTOLIC BLOOD PRESSURE: 126 MMHG | HEIGHT: 74.41 IN | BODY MASS INDEX: 24.64 KG/M2 | WEIGHT: 194 LBS | HEART RATE: 72 BPM | OXYGEN SATURATION: 97 % | RESPIRATION RATE: 16 BRPM | TEMPERATURE: 97 F | DIASTOLIC BLOOD PRESSURE: 62 MMHG

## 2021-11-24 DIAGNOSIS — E53.8 B12 DEFICIENCY: Primary | ICD-10-CM

## 2021-11-24 DIAGNOSIS — E11.9 TYPE 2 DIABETES MELLITUS WITHOUT COMPLICATION, WITHOUT LONG-TERM CURRENT USE OF INSULIN (HCC): ICD-10-CM

## 2021-11-24 PROCEDURE — 99214 OFFICE O/P EST MOD 30 MIN: CPT | Performed by: INTERNAL MEDICINE

## 2021-11-24 PROCEDURE — 96372 THER/PROPH/DIAG INJ SC/IM: CPT | Performed by: INTERNAL MEDICINE

## 2021-11-24 PROCEDURE — 3074F SYST BP LT 130 MM HG: CPT | Performed by: INTERNAL MEDICINE

## 2021-11-24 PROCEDURE — 3078F DIAST BP <80 MM HG: CPT | Performed by: INTERNAL MEDICINE

## 2021-11-24 PROCEDURE — 3008F BODY MASS INDEX DOCD: CPT | Performed by: INTERNAL MEDICINE

## 2021-11-24 RX ORDER — CYANOCOBALAMIN 1000 UG/ML
1000 INJECTION INTRAMUSCULAR; SUBCUTANEOUS ONCE
Status: COMPLETED | OUTPATIENT
Start: 2021-11-24 | End: 2021-11-24

## 2021-11-24 RX ADMIN — CYANOCOBALAMIN 1000 MCG: 1000 INJECTION INTRAMUSCULAR; SUBCUTANEOUS at 09:25:00

## 2021-11-24 NOTE — PROGRESS NOTES
Subjective:   Patient ID: Taylor Argueta is a 80year old male.     HPI  Here for letter to be able to snorkel without restriction  /62   Pulse 72   Temp 97 °F (36.1 °C)   Resp 16   Ht 6' 2.41\" (1.89 m)   Wt 194 lb (88 kg)   SpO2 97%   BMI 24.64 kg Assessment & Plan:   B12 deficiency  (primary encounter diagnosis)-IM today, start po, fu for outpt injections  Type 2 diabetes mellitus without complication, without long-term current use of insulin (HCC)-no symptomatic lows, bp ok today, initial

## 2021-12-13 RX ORDER — METFORMIN HYDROCHLORIDE 500 MG/1
TABLET, EXTENDED RELEASE ORAL
Qty: 360 TABLET | Refills: 0 | Status: SHIPPED | OUTPATIENT
Start: 2021-12-13

## 2021-12-13 NOTE — TELEPHONE ENCOUNTER
Last VISIT 11/24/21    Last CPE 09/04/20    Last REFILL 07/23/21 qty 8 w/3 refills    Last LABS 11/15/21 Covid test done    No Future Appointments      Per PROTOCOL? Not on protocol      Please Approve or Deny.

## 2021-12-14 RX ORDER — SILDENAFIL 50 MG/1
TABLET, FILM COATED ORAL
Qty: 8 TABLET | Refills: 5 | Status: SHIPPED | OUTPATIENT
Start: 2021-12-14

## 2021-12-28 ENCOUNTER — NURSE ONLY (OUTPATIENT)
Dept: INTERNAL MEDICINE CLINIC | Facility: CLINIC | Age: 71
End: 2021-12-28

## 2021-12-28 DIAGNOSIS — E53.8 B12 DEFICIENCY: Primary | ICD-10-CM

## 2021-12-28 PROCEDURE — 96372 THER/PROPH/DIAG INJ SC/IM: CPT | Performed by: PHYSICIAN ASSISTANT

## 2021-12-28 RX ORDER — CYANOCOBALAMIN 1000 UG/ML
1000 INJECTION INTRAMUSCULAR; SUBCUTANEOUS ONCE
Status: COMPLETED | OUTPATIENT
Start: 2021-12-28 | End: 2021-12-28

## 2021-12-28 RX ADMIN — CYANOCOBALAMIN 1000 MCG: 1000 INJECTION INTRAMUSCULAR; SUBCUTANEOUS at 09:08:00

## 2022-01-08 RX ORDER — ATENOLOL 50 MG/1
TABLET ORAL
Qty: 90 TABLET | Refills: 0 | Status: SHIPPED | OUTPATIENT
Start: 2022-01-08

## 2022-01-08 RX ORDER — EMPAGLIFLOZIN 10 MG/1
TABLET, FILM COATED ORAL
Qty: 90 TABLET | Refills: 0 | Status: SHIPPED | OUTPATIENT
Start: 2022-01-08

## 2022-01-08 NOTE — TELEPHONE ENCOUNTER
Medication(s) to Refill:   Requested Prescriptions     Pending Prescriptions Disp Refills   • ATENOLOL 50 MG Oral Tab [Pharmacy Med Name: Atenolol 50 MG Oral Tablet] 90 tablet 0     Sig: Take 1 tablet by mouth once daily   • JARDIANCE 10 MG Oral Tab [Pharm

## 2022-01-11 ENCOUNTER — TELEPHONE (OUTPATIENT)
Dept: INTERNAL MEDICINE CLINIC | Facility: CLINIC | Age: 72
End: 2022-01-11

## 2022-01-20 ENCOUNTER — PATIENT MESSAGE (OUTPATIENT)
Dept: INTERNAL MEDICINE CLINIC | Facility: CLINIC | Age: 72
End: 2022-01-20

## 2022-01-20 ENCOUNTER — TELEPHONE (OUTPATIENT)
Dept: INTERNAL MEDICINE CLINIC | Facility: CLINIC | Age: 72
End: 2022-01-20

## 2022-01-20 NOTE — TELEPHONE ENCOUNTER
What is the patient's vaccine status? Vaccinated and Booster  Is the patient symptomatic? Yes, Cough,congestion,weak--last evening patient got up to go to the bathroom and he fell. Patient hit his head and has a pretty good bump; above R eye.   If so, what

## 2022-01-20 NOTE — TELEPHONE ENCOUNTER
Sending to JL and on-call CB. OMAI unless either feel pt should go to IC/ED today. Spoke to wife and patient. Pt reports cough, congestion, and generalized weakness since yesterday. Took home covid test and was positive yesterday.  Pt states he's [place]

## 2022-01-20 NOTE — TELEPHONE ENCOUNTER
Jonnie Eric  to Chelo Blunt MD          1/20/22 12:24 PM  I have a cough and runny nose. I took a rapid test and it's positive. I'm tired and weak and I fell last night getting to the bathroom.   Do I need a PCR test and is there any special

## 2022-01-20 NOTE — TELEPHONE ENCOUNTER
Discussed with CB. Agrees with strong ED warning signs at this time.  States will also see what JL says as he knows the pt best.

## 2022-01-20 NOTE — TELEPHONE ENCOUNTER
From: Marvin Cohen  To: Filippo Wiley MD  Sent: 1/20/2022 12:24 PM CST  Subject: positive covid test    I have a cough and runny nose. I took a rapid test and it's positive. I'm tired and weak and I fell last night getting to the bathroom.  Do I need

## 2022-01-21 ENCOUNTER — APPOINTMENT (OUTPATIENT)
Dept: GENERAL RADIOLOGY | Facility: HOSPITAL | Age: 72
End: 2022-01-21
Attending: EMERGENCY MEDICINE
Payer: COMMERCIAL

## 2022-01-21 ENCOUNTER — APPOINTMENT (OUTPATIENT)
Dept: CT IMAGING | Facility: HOSPITAL | Age: 72
End: 2022-01-21
Attending: EMERGENCY MEDICINE
Payer: COMMERCIAL

## 2022-01-21 ENCOUNTER — HOSPITAL ENCOUNTER (OUTPATIENT)
Facility: HOSPITAL | Age: 72
Setting detail: OBSERVATION
Discharge: HOME | End: 2022-01-22
Attending: EMERGENCY MEDICINE | Admitting: INTERNAL MEDICINE
Payer: COMMERCIAL

## 2022-01-21 DIAGNOSIS — E87.6 HYPOKALEMIA: ICD-10-CM

## 2022-01-21 DIAGNOSIS — R55 SYNCOPE AND COLLAPSE: Primary | ICD-10-CM

## 2022-01-21 LAB
ALBUMIN SERPL-MCNC: 3.2 G/DL (ref 3.4–5)
ALBUMIN/GLOB SERPL: 0.8 {RATIO} (ref 1–2)
ALP LIVER SERPL-CCNC: 98 U/L
ALT SERPL-CCNC: 64 U/L
ANION GAP SERPL CALC-SCNC: 8 MMOL/L (ref 0–18)
AST SERPL-CCNC: 46 U/L (ref 15–37)
BASOPHILS # BLD AUTO: 0.01 X10(3) UL (ref 0–0.2)
BASOPHILS NFR BLD AUTO: 0.3 %
BILIRUB SERPL-MCNC: 0.9 MG/DL (ref 0.1–2)
BUN BLD-MCNC: 7 MG/DL (ref 7–18)
CALCIUM BLD-MCNC: 8.5 MG/DL (ref 8.5–10.1)
CHLORIDE SERPL-SCNC: 99 MMOL/L (ref 98–112)
CO2 SERPL-SCNC: 31 MMOL/L (ref 21–32)
CREAT BLD-MCNC: 0.8 MG/DL
D DIMER PPP FEU-MCNC: 0.69 UG/ML FEU (ref ?–0.7)
EOSINOPHIL # BLD AUTO: 0.06 X10(3) UL (ref 0–0.7)
EOSINOPHIL NFR BLD AUTO: 1.6 %
ERYTHROCYTE [DISTWIDTH] IN BLOOD BY AUTOMATED COUNT: 13.2 %
GLOBULIN PLAS-MCNC: 3.9 G/DL (ref 2.8–4.4)
GLUCOSE BLD-MCNC: 163 MG/DL (ref 70–99)
GLUCOSE BLD-MCNC: 204 MG/DL (ref 70–99)
HCT VFR BLD AUTO: 42.5 %
HGB BLD-MCNC: 14.3 G/DL
IMM GRANULOCYTES # BLD AUTO: 0.01 X10(3) UL (ref 0–1)
IMM GRANULOCYTES NFR BLD: 0.3 %
LYMPHOCYTES # BLD AUTO: 1.65 X10(3) UL (ref 1–4)
LYMPHOCYTES NFR BLD AUTO: 44.5 %
MCH RBC QN AUTO: 34 PG (ref 26–34)
MCHC RBC AUTO-ENTMCNC: 33.6 G/DL (ref 31–37)
MCV RBC AUTO: 101 FL
MONOCYTES # BLD AUTO: 0.45 X10(3) UL (ref 0.1–1)
MONOCYTES NFR BLD AUTO: 12.1 %
NEUTROPHILS # BLD AUTO: 1.53 X10 (3) UL (ref 1.5–7.7)
NEUTROPHILS # BLD AUTO: 1.53 X10(3) UL (ref 1.5–7.7)
NEUTROPHILS NFR BLD AUTO: 41.2 %
OSMOLALITY SERPL CALC.SUM OF ELEC: 290 MOSM/KG (ref 275–295)
PLATELET # BLD AUTO: 105 10(3)UL (ref 150–450)
POTASSIUM SERPL-SCNC: 3 MMOL/L (ref 3.5–5.1)
PROT SERPL-MCNC: 7.1 G/DL (ref 6.4–8.2)
RBC # BLD AUTO: 4.21 X10(6)UL
SARS-COV-2 RNA RESP QL NAA+PROBE: DETECTED
SODIUM SERPL-SCNC: 138 MMOL/L (ref 136–145)
TROPONIN I HIGH SENSITIVITY: 7 NG/L
WBC # BLD AUTO: 3.7 X10(3) UL (ref 4–11)

## 2022-01-21 PROCEDURE — 70450 CT HEAD/BRAIN W/O DYE: CPT | Performed by: EMERGENCY MEDICINE

## 2022-01-21 PROCEDURE — 99220 INITIAL OBSERVATION CARE,LEVL III: CPT | Performed by: HOSPITALIST

## 2022-01-21 PROCEDURE — 73030 X-RAY EXAM OF SHOULDER: CPT | Performed by: EMERGENCY MEDICINE

## 2022-01-21 PROCEDURE — 71045 X-RAY EXAM CHEST 1 VIEW: CPT | Performed by: EMERGENCY MEDICINE

## 2022-01-21 RX ORDER — AMLODIPINE BESYLATE 2.5 MG/1
2.5 TABLET ORAL DAILY
Status: DISCONTINUED | OUTPATIENT
Start: 2022-01-22 | End: 2022-01-22

## 2022-01-21 RX ORDER — ALBUTEROL SULFATE 90 UG/1
4 AEROSOL, METERED RESPIRATORY (INHALATION) EVERY 4 HOURS PRN
Status: DISCONTINUED | OUTPATIENT
Start: 2022-01-21 | End: 2022-01-22

## 2022-01-21 RX ORDER — ATENOLOL 50 MG/1
50 TABLET ORAL DAILY
Status: DISCONTINUED | OUTPATIENT
Start: 2022-01-22 | End: 2022-01-22

## 2022-01-21 RX ORDER — LOSARTAN POTASSIUM 100 MG/1
100 TABLET ORAL DAILY
Status: DISCONTINUED | OUTPATIENT
Start: 2022-01-22 | End: 2022-01-22

## 2022-01-21 RX ORDER — DEXTROSE MONOHYDRATE 25 G/50ML
50 INJECTION, SOLUTION INTRAVENOUS
Status: DISCONTINUED | OUTPATIENT
Start: 2022-01-21 | End: 2022-01-22

## 2022-01-21 RX ORDER — BENZONATATE 200 MG/1
200 CAPSULE ORAL 3 TIMES DAILY PRN
Status: DISCONTINUED | OUTPATIENT
Start: 2022-01-21 | End: 2022-01-22

## 2022-01-21 RX ORDER — POLYETHYLENE GLYCOL 3350 17 G/17G
17 POWDER, FOR SOLUTION ORAL DAILY PRN
Status: DISCONTINUED | OUTPATIENT
Start: 2022-01-21 | End: 2022-01-22

## 2022-01-21 RX ORDER — ATORVASTATIN CALCIUM 20 MG/1
20 TABLET, FILM COATED ORAL NIGHTLY
Status: DISCONTINUED | OUTPATIENT
Start: 2022-01-21 | End: 2022-01-22

## 2022-01-21 RX ORDER — ACETAMINOPHEN 325 MG/1
650 TABLET ORAL EVERY 6 HOURS PRN
Status: DISCONTINUED | OUTPATIENT
Start: 2022-01-21 | End: 2022-01-22

## 2022-01-21 RX ORDER — HYDRALAZINE HYDROCHLORIDE 20 MG/ML
10 INJECTION INTRAMUSCULAR; INTRAVENOUS EVERY 4 HOURS PRN
Status: DISCONTINUED | OUTPATIENT
Start: 2022-01-21 | End: 2022-01-22

## 2022-01-21 RX ORDER — ONDANSETRON 2 MG/ML
4 INJECTION INTRAMUSCULAR; INTRAVENOUS EVERY 6 HOURS PRN
Status: DISCONTINUED | OUTPATIENT
Start: 2022-01-21 | End: 2022-01-22

## 2022-01-21 RX ORDER — PANTOPRAZOLE SODIUM 40 MG/1
40 TABLET, DELAYED RELEASE ORAL
Status: DISCONTINUED | OUTPATIENT
Start: 2022-01-21 | End: 2022-01-22

## 2022-01-21 RX ORDER — POTASSIUM CHLORIDE 20 MEQ/1
40 TABLET, EXTENDED RELEASE ORAL ONCE
Status: COMPLETED | OUTPATIENT
Start: 2022-01-21 | End: 2022-01-21

## 2022-01-21 RX ORDER — ENOXAPARIN SODIUM 100 MG/ML
40 INJECTION SUBCUTANEOUS NIGHTLY
Status: DISCONTINUED | OUTPATIENT
Start: 2022-01-21 | End: 2022-01-22

## 2022-01-21 RX ORDER — ASPIRIN 81 MG/1
81 TABLET ORAL DAILY
Status: DISCONTINUED | OUTPATIENT
Start: 2022-01-22 | End: 2022-01-22

## 2022-01-21 RX ORDER — MELATONIN
3 NIGHTLY PRN
Status: DISCONTINUED | OUTPATIENT
Start: 2022-01-21 | End: 2022-01-22

## 2022-01-21 NOTE — ED INITIAL ASSESSMENT (HPI)
Pt got up during the night to go to the bathroom, syncopal event, unsure as to why he feel, abrasion to forehead.

## 2022-01-21 NOTE — TELEPHONE ENCOUNTER
Patient and pt's wife notified he will need to go to the ER now for evaluation. Patient and wife verbalizes understanding.

## 2022-01-21 NOTE — H&P
MARY HOSPITALIST  History and Physical     Genie Tai Patient Status:  Emergency    1950 MRN [id number]   Location [address] Attending [facility], [address] [facility] Day # 0 PCP Stefan Cano MD     Chief Complain Location: [address] [place] [address] ENDOSCOPY   • NECK/CHEST PROCEDURE UNLISTED  1998    ruptured vertebrae-cervical   • PERCUTANEOUS  ANGIOPLASTY  (PTCA)- PBP ONLY     • REMOVAL GALLBLADDER         Social History:  reports that he has never smoked.  He has never used smokeless t comprehensive 14 point review of systems was completed. Pertinent positives and negatives noted in the HPI. Physical Exam:    BP (!) 169/87   Pulse 61   Temp 97.3 °F (36.3 °C)   Resp 15   Ht 6' 3\" (1.905 m)   Wt 197 lb (89.4 kg)   SpO2 99%   BMI 24. be relatively asymptomatic. No treatment needed  3. Inflammatory markers are low  3. Transaminitis  4. Leukopenia  1. Likely secondary to COVID infection  5. Chronic thrombocytopenia -stable  6. Hypertension  7. Hyperlipidemia  8.  CAD status post MI    [facility] SYS

## 2022-01-21 NOTE — ED QUICK NOTES
Orders for admission, patient is aware of plan and ready to go upstairs. Any questions, please call ED EMANUEL huggins  at extension [phone]. Vaccinated?  yes  Type of COVID test sent: rapid  COVID Suspicion level: High      Titratable drug(s) infusing: N/a  Ra

## 2022-01-21 NOTE — ED PROVIDER NOTES
Patient Seen in: [facility] Emergency Department      History   Patient presents with:  Trauma    Stated Complaint: fall thursday am.  struck head    Subjective:   HPI    This is a 28-year-old male that presents with syncopal event.   Patient states ESOPHAGOGASTRODUODENOSCOPY (EGD);   Surgeon: Rl Healy DO;  Location: [address] [place] [address] ENDOSCOPY   • NECK/CHEST PROCEDURE UNLISTED  1998    ruptured vertebrae-cervical   • PERCUTANEOUS  ANGIOPLASTY  (PTCA)- PBP ONLY     • REMOVAL GALLBLADDER                  Soci Glucose 204 (*)     Potassium 3.0 (*)     AST 46 (*)     ALT 64 (*)     Albumin 3.2 (*)     A/G Ratio 0.8 (*)     All other components within normal limits   RAPID SARS-COV-2 BY PCR - Abnormal; Notable for the following components:    Rapid SARS-CoV-2 b change. SINUSES:           No sign of acute sinusitis. Mucoperiosteal thickening involves the maxillary and ethmoid sinuses. Hypo/aplastic left frontal sinus. MASTOIDS:          No sign of acute inflammation.   SKULL:             No evidence for fractu one ordered in October 2021 but never completed it. Possibly COVID related. He is unaware that he even had COVID and is really had no respiratory symptoms. Will admit to cardiac telemetry for further work-up and evaluation.  Case discussed with Joseluis Hernandez

## 2022-01-22 ENCOUNTER — APPOINTMENT (OUTPATIENT)
Dept: CV DIAGNOSTICS | Facility: HOSPITAL | Age: 72
End: 2022-01-22
Attending: INTERNAL MEDICINE
Payer: COMMERCIAL

## 2022-01-22 VITALS
RESPIRATION RATE: 17 BRPM | WEIGHT: 197 LBS | HEIGHT: 75 IN | OXYGEN SATURATION: 96 % | DIASTOLIC BLOOD PRESSURE: 73 MMHG | SYSTOLIC BLOOD PRESSURE: 137 MMHG | TEMPERATURE: 98 F | BODY MASS INDEX: 24.49 KG/M2 | HEART RATE: 60 BPM

## 2022-01-22 LAB
ALBUMIN SERPL-MCNC: 3 G/DL (ref 3.4–5)
ALBUMIN/GLOB SERPL: 0.9 {RATIO} (ref 1–2)
ALP LIVER SERPL-CCNC: 89 U/L
ALT SERPL-CCNC: 55 U/L
ANION GAP SERPL CALC-SCNC: 9 MMOL/L (ref 0–18)
AST SERPL-CCNC: 42 U/L (ref 15–37)
BASOPHILS # BLD AUTO: 0.01 X10(3) UL (ref 0–0.2)
BASOPHILS NFR BLD AUTO: 0.3 %
BILIRUB SERPL-MCNC: 1 MG/DL (ref 0.1–2)
BUN BLD-MCNC: 9 MG/DL (ref 7–18)
CALCIUM BLD-MCNC: 8.4 MG/DL (ref 8.5–10.1)
CHLORIDE SERPL-SCNC: 102 MMOL/L (ref 98–112)
CO2 SERPL-SCNC: 30 MMOL/L (ref 21–32)
CREAT BLD-MCNC: 0.71 MG/DL
CRP SERPL-MCNC: 0.73 MG/DL (ref ?–0.3)
D DIMER PPP FEU-MCNC: 0.43 UG/ML FEU (ref ?–0.7)
DEPRECATED HBV CORE AB SER IA-ACNC: 72.3 NG/ML
EOSINOPHIL # BLD AUTO: 0.06 X10(3) UL (ref 0–0.7)
EOSINOPHIL NFR BLD AUTO: 1.9 %
ERYTHROCYTE [DISTWIDTH] IN BLOOD BY AUTOMATED COUNT: 13.2 %
GLOBULIN PLAS-MCNC: 3.4 G/DL (ref 2.8–4.4)
GLUCOSE BLD-MCNC: 137 MG/DL (ref 70–99)
GLUCOSE BLD-MCNC: 155 MG/DL (ref 70–99)
GLUCOSE BLD-MCNC: 220 MG/DL (ref 70–99)
HCT VFR BLD AUTO: 41.1 %
HGB BLD-MCNC: 14.1 G/DL
IMM GRANULOCYTES # BLD AUTO: 0.01 X10(3) UL (ref 0–1)
IMM GRANULOCYTES NFR BLD: 0.3 %
LDH SERPL L TO P-CCNC: 208 U/L
LYMPHOCYTES # BLD AUTO: 1.43 X10(3) UL (ref 1–4)
LYMPHOCYTES NFR BLD AUTO: 45.5 %
MCH RBC QN AUTO: 35.1 PG (ref 26–34)
MCHC RBC AUTO-ENTMCNC: 34.3 G/DL (ref 31–37)
MCV RBC AUTO: 102.2 FL
MONOCYTES # BLD AUTO: 0.38 X10(3) UL (ref 0.1–1)
MONOCYTES NFR BLD AUTO: 12.1 %
NEUTROPHILS # BLD AUTO: 1.25 X10 (3) UL (ref 1.5–7.7)
NEUTROPHILS # BLD AUTO: 1.25 X10(3) UL (ref 1.5–7.7)
NEUTROPHILS NFR BLD AUTO: 39.9 %
NT-PROBNP SERPL-MCNC: 314 PG/ML (ref ?–125)
OSMOLALITY SERPL CALC.SUM OF ELEC: 293 MOSM/KG (ref 275–295)
PLATELET # BLD AUTO: 97 10(3)UL (ref 150–450)
POTASSIUM SERPL-SCNC: 3.4 MMOL/L (ref 3.5–5.1)
PROT SERPL-MCNC: 6.4 G/DL (ref 6.4–8.2)
RBC # BLD AUTO: 4.02 X10(6)UL
SODIUM SERPL-SCNC: 141 MMOL/L (ref 136–145)
WBC # BLD AUTO: 3.1 X10(3) UL (ref 4–11)

## 2022-01-22 PROCEDURE — 93307 TTE W/O DOPPLER COMPLETE: CPT | Performed by: INTERNAL MEDICINE

## 2022-01-22 PROCEDURE — 99217 OBSERVATION CARE DISCHARGE: CPT | Performed by: HOSPITALIST

## 2022-01-22 RX ORDER — POTASSIUM CHLORIDE 20 MEQ/1
40 TABLET, EXTENDED RELEASE ORAL ONCE
Status: COMPLETED | OUTPATIENT
Start: 2022-01-22 | End: 2022-01-22

## 2022-01-22 RX ORDER — GUAIFENESIN 600 MG
600 TABLET, EXTENDED RELEASE 12 HR ORAL 2 TIMES DAILY PRN
Status: DISCONTINUED | OUTPATIENT
Start: 2022-01-22 | End: 2022-01-22

## 2022-01-22 NOTE — PLAN OF CARE
NURSING ADMISSION NOTE      Patient admitted via Cart  Oriented to room. Safety precautions initiated. Bed in low position. Call light in reach. P/t A/ox4, VSS. Admission Navigator completed with pt.   All questions addressed

## 2022-01-22 NOTE — PROGRESS NOTES
[facility]     Cardiology Progress Note    Duncan Hudson Patient Status:  Observation    1950 MRN [id number]   [place] 5NW-A Attending Flor Raymond MD   Hosp Day # 0 PCP Tracy Bacon MD       SUBJECTIVE:      Physical 3. 7* 3.1*   HGB 14.3 14.1   .0* 102.2*   .0* 97.0*       Recent Labs   Lab 01/21/22  1352 01/22/22  0735    141   K 3.0* 3.4*   CL 99 102   CO2 31.0 30.0   BUN 7 9   CREATSERUM 0.80 0.71   CA 8.5 8.4*   * 137*       Recent Labs Summary:     Questions/concerns were discussed with patient and/or family by bedside.       Yoshi Lawson, ONEAL   1/22/2022  2:18 PM

## 2022-01-22 NOTE — PLAN OF CARE
Assumed care of pt @ 4174. Pt is A/Ox 4. On RA, VSS, SR on tele. IV saline locked  Tolerating diet. Pt denies pain  Up as tolerated  Intake/outputs WNL. Plan dc later if 2d echo negative    Updated POC with patient and family.  Will continue to mon

## 2022-01-22 NOTE — PROGRESS NOTES
orthastatic bp negative       01/22/22 0828 01/22/22 0830 01/22/22 0833   Vital Signs   Pulse 61 64 66   /84 156/86 (!) 163/86   MAP (mmHg) 101 104 107   BP Location Left arm  --   --    BP Method Automatic  --   --    Patient Position Lying Sitting

## 2022-01-22 NOTE — PLAN OF CARE
NURSING DISCHARGE NOTE    Discharged Home via Wheelchair. Accompanied by Support staff  Belongings Taken by patient/family. Aware of f/u appt. No changes to meds. Aware to  30 day event monitor. All questions answered.

## 2022-01-22 NOTE — PROGRESS NOTES
Orthostatic bp       01/21/22 1818 01/21/22 1819 01/21/22 1820   Vital Signs   Pulse 63 64 67   Heart Rate Source Monitor  --   --    Resp 18  --   --    Respiratory Quality Normal  --   --    /90 156/78 (!) 137/96   MAP (mmHg) 106 99 104   BP Locati

## 2022-01-22 NOTE — COVID NURSING ASSESSMENT
COVID-19 Daily Discharge Readiness-Nursing    O2 Sat at Rest:   95  %   O2 Sat with Exertion:    95% on   0 liters   Temperature max from last 24 hrs: Temp (24hrs), Av.2 °F (36.8 °C), Min:97.3 °F (36.3 °C), Max:98.8 °F (37.1 °C)    Inflammatory Markers

## 2022-01-22 NOTE — TREATMENT PLAN
1/22/22: Echocardiogram: Normal LVEF, no structural or valvular disease. Patient may be discharged and must obtain a 30 day EM. Follow up with Dr. Rhiannon Escamilla in 2-4 weeks. Seek emergency medical treatment for any further symptoms or syncope.  Patient refused

## 2022-01-23 NOTE — DISCHARGE SUMMARY
MARY HOSPITALIST  DISCHARGE SUMMARY     Bakari Hendrickson Patient Status:  Observation    1950 MRN [id number]   [facility] 5NW-A Attending No att. providers found   Hosp Day # 0 PCP Darnell Rice MD     Date of Admission: 2022 headache. He is vaccinated for COVID with a booster.       Brief Synopsis: pt w/ sudden syncope. COVID seems to be asymptomatic. Concern for cardiac arrythmia as a cause of syncope but none noticed on tele during observation.  Recommend f/u cards as outpt mg total) by mouth twice daily, 30 minutes prior to breakfast and 30 minutes prior to dinner.    Quantity: 180 tablet  Refills: 3     Sildenafil Citrate 50 MG Tabs  Commonly known as: VIAGRA      TAKE 1 TABLET BY MOUTH ONCE DAILY AS NEEDED FOR ERECTILE DYSF

## 2022-01-24 ENCOUNTER — TELEPHONE (OUTPATIENT)
Dept: INTERNAL MEDICINE CLINIC | Facility: CLINIC | Age: 72
End: 2022-01-24

## 2022-01-24 ENCOUNTER — PATIENT OUTREACH (OUTPATIENT)
Dept: CASE MANAGEMENT | Age: 72
End: 2022-01-24

## 2022-01-24 DIAGNOSIS — Z02.9 ENCOUNTERS FOR UNSPECIFIED ADMINISTRATIVE PURPOSE: ICD-10-CM

## 2022-01-24 LAB
ATRIAL RATE: 58 BPM
P AXIS: 22 DEGREES
P-R INTERVAL: 172 MS
Q-T INTERVAL: 466 MS
QRS DURATION: 86 MS
QTC CALCULATION (BEZET): 457 MS
R AXIS: -5 DEGREES
T AXIS: 16 DEGREES
VENTRICULAR RATE: 58 BPM

## 2022-01-24 NOTE — TELEPHONE ENCOUNTER
Future Appointments   Date Time Provider Linda Maddox   1/31/2022  9:40 AM Rebecca Barrera MD [facility]   3/11/2022  2:30 PM Iman WONG MD [facility] 4

## 2022-01-24 NOTE — TELEPHONE ENCOUNTER
Spoke to pt for TCM today. Pt does not have HFU appt scheduled at this time. Patient reports symptom onset of 1/16/22. TCM/HFU appt recommended by 2/5/22 as pt is a moderate risk for readmission. Please advise.     BOOK BY DATE (last date for TCM): 2/5/2

## 2022-01-24 NOTE — PROGRESS NOTES
Initial Post Discharge Follow Up   Discharge Date: 1/22/22  Contact Date: 1/24/2022    Consent Verification:  Assessment Completed With: Patient  HIPAA Verified?   Yes    Discharge Dx:     Syncope and collapse [R55]  Hypokalemia [E87.6]  COVID-19    Was 5 MG Oral Tablet 24 Hr Take 1 tablet by mouth once daily 90 tablet 1   • aspirin 81 MG Oral Tab EC Take 81 mg by mouth daily.      • Pantoprazole Sodium 40 MG Oral Tab EC Take one tablet (40 mg total) by mouth twice daily, 30 minutes prior to breakfast and No     NCM Reviewed upcoming Specialist Appt with patient     Not Applicable    Overall Rating:    How would you rate the care you received while in the hospital?  good     Interventions by NCM:   Spoke with patient who reports doing fine since getting home

## 2022-01-25 ENCOUNTER — TELEPHONE (OUTPATIENT)
Dept: INTERNAL MEDICINE CLINIC | Facility: CLINIC | Age: 72
End: 2022-01-25

## 2022-01-25 NOTE — TELEPHONE ENCOUNTER
Message  Received:  Today  Vilma Hensley MD  P [facility] Clinical Staff           Previous Messages            Routed Note    Author: Vilma Hensley MD Service: Adarsh Baker Author Type: Physician   Filed: 1/25/2022  4:42 PM Encounter Date: 1/24/2022 Status: David Agustin

## 2022-01-26 NOTE — TELEPHONE ENCOUNTER
Future Appointments   Date Time Provider Linda Maddox   1/31/2022  9:40 AM Bina Buckley MD [facility]   3/11/2022  2:30 PM Janene WONG MD [facility] 4

## 2022-01-31 ENCOUNTER — OFFICE VISIT (OUTPATIENT)
Dept: INTERNAL MEDICINE CLINIC | Facility: CLINIC | Age: 72
End: 2022-01-31
Payer: COMMERCIAL

## 2022-01-31 VITALS
HEIGHT: 75 IN | RESPIRATION RATE: 18 BRPM | BODY MASS INDEX: 23.55 KG/M2 | TEMPERATURE: 97 F | OXYGEN SATURATION: 98 % | SYSTOLIC BLOOD PRESSURE: 126 MMHG | DIASTOLIC BLOOD PRESSURE: 62 MMHG | HEART RATE: 78 BPM | WEIGHT: 189.38 LBS

## 2022-01-31 DIAGNOSIS — U07.1 COVID: ICD-10-CM

## 2022-01-31 DIAGNOSIS — I10 ESSENTIAL HYPERTENSION WITH GOAL BLOOD PRESSURE LESS THAN 130/80: Primary | ICD-10-CM

## 2022-01-31 DIAGNOSIS — E53.8 B12 DEFICIENCY: ICD-10-CM

## 2022-01-31 DIAGNOSIS — R55 SYNCOPE AND COLLAPSE: ICD-10-CM

## 2022-01-31 DIAGNOSIS — E11.9 TYPE 2 DIABETES MELLITUS WITHOUT COMPLICATION, WITHOUT LONG-TERM CURRENT USE OF INSULIN (HCC): ICD-10-CM

## 2022-01-31 PROBLEM — E11.40 TYPE 2 DIABETES MELLITUS WITH DIABETIC NEUROPATHY (HCC): Status: ACTIVE | Noted: 2022-01-31

## 2022-01-31 PROCEDURE — 99214 OFFICE O/P EST MOD 30 MIN: CPT | Performed by: INTERNAL MEDICINE

## 2022-01-31 PROCEDURE — 3078F DIAST BP <80 MM HG: CPT | Performed by: INTERNAL MEDICINE

## 2022-01-31 PROCEDURE — 96372 THER/PROPH/DIAG INJ SC/IM: CPT | Performed by: INTERNAL MEDICINE

## 2022-01-31 PROCEDURE — 3008F BODY MASS INDEX DOCD: CPT | Performed by: INTERNAL MEDICINE

## 2022-01-31 PROCEDURE — 1111F DSCHRG MED/CURRENT MED MERGE: CPT | Performed by: INTERNAL MEDICINE

## 2022-01-31 PROCEDURE — 3074F SYST BP LT 130 MM HG: CPT | Performed by: INTERNAL MEDICINE

## 2022-01-31 RX ORDER — CYANOCOBALAMIN 1000 UG/ML
1000 INJECTION INTRAMUSCULAR; SUBCUTANEOUS ONCE
Status: COMPLETED | OUTPATIENT
Start: 2022-01-31 | End: 2022-01-31

## 2022-01-31 RX ADMIN — CYANOCOBALAMIN 1000 MCG: 1000 INJECTION INTRAMUSCULAR; SUBCUTANEOUS at 10:05:00

## 2022-02-22 RX ORDER — AMLODIPINE BESYLATE 2.5 MG/1
TABLET ORAL
Qty: 90 TABLET | Refills: 0 | Status: ON HOLD | OUTPATIENT
Start: 2022-02-22 | End: 2022-03-07

## 2022-02-22 RX ORDER — ATORVASTATIN CALCIUM 20 MG/1
TABLET, FILM COATED ORAL
Qty: 90 TABLET | Refills: 0 | Status: ON HOLD | OUTPATIENT
Start: 2022-02-22 | End: 2022-03-07

## 2022-03-01 ENCOUNTER — NURSE ONLY (OUTPATIENT)
Dept: INTERNAL MEDICINE CLINIC | Facility: CLINIC | Age: 72
End: 2022-03-01
Payer: COMMERCIAL

## 2022-03-01 DIAGNOSIS — E53.8 B12 DEFICIENCY: Primary | ICD-10-CM

## 2022-03-01 PROCEDURE — 96372 THER/PROPH/DIAG INJ SC/IM: CPT | Performed by: INTERNAL MEDICINE

## 2022-03-01 RX ORDER — CYANOCOBALAMIN 1000 UG/ML
1000 INJECTION INTRAMUSCULAR; SUBCUTANEOUS ONCE
Status: COMPLETED | OUTPATIENT
Start: 2022-03-01 | End: 2022-03-01

## 2022-03-01 RX ADMIN — CYANOCOBALAMIN 1000 MCG: 1000 INJECTION INTRAMUSCULAR; SUBCUTANEOUS at 08:42:00

## 2022-03-07 ENCOUNTER — APPOINTMENT (OUTPATIENT)
Dept: ULTRASOUND IMAGING | Facility: HOSPITAL | Age: 72
DRG: 440 | End: 2022-03-07
Attending: EMERGENCY MEDICINE
Payer: COMMERCIAL

## 2022-03-07 ENCOUNTER — APPOINTMENT (OUTPATIENT)
Dept: CT IMAGING | Facility: HOSPITAL | Age: 72
DRG: 440 | End: 2022-03-07
Attending: EMERGENCY MEDICINE
Payer: COMMERCIAL

## 2022-03-07 ENCOUNTER — APPOINTMENT (OUTPATIENT)
Dept: GENERAL RADIOLOGY | Facility: HOSPITAL | Age: 72
DRG: 440 | End: 2022-03-07
Attending: EMERGENCY MEDICINE
Payer: COMMERCIAL

## 2022-03-07 ENCOUNTER — HOSPITAL ENCOUNTER (INPATIENT)
Facility: HOSPITAL | Age: 72
LOS: 1 days | Discharge: HOME | DRG: 440 | End: 2022-03-08
Attending: EMERGENCY MEDICINE | Admitting: INTERNAL MEDICINE
Payer: COMMERCIAL

## 2022-03-07 DIAGNOSIS — K85.90 ACUTE PANCREATITIS, UNSPECIFIED COMPLICATION STATUS, UNSPECIFIED PANCREATITIS TYPE: Primary | ICD-10-CM

## 2022-03-07 PROBLEM — R73.9 HYPERGLYCEMIA: Status: ACTIVE | Noted: 2022-03-07

## 2022-03-07 LAB
ALBUMIN SERPL-MCNC: 3.2 G/DL (ref 3.4–5)
ALBUMIN/GLOB SERPL: 0.9 {RATIO} (ref 1–2)
ALP LIVER SERPL-CCNC: 57 U/L
ALT SERPL-CCNC: 39 U/L
ANION GAP SERPL CALC-SCNC: 6 MMOL/L (ref 0–18)
AST SERPL-CCNC: 22 U/L (ref 15–37)
BASOPHILS # BLD AUTO: 0.02 X10(3) UL (ref 0–0.2)
BASOPHILS NFR BLD AUTO: 0.2 %
BILIRUB SERPL-MCNC: 1.6 MG/DL (ref 0.1–2)
BILIRUB UR QL STRIP.AUTO: NEGATIVE
BUN BLD-MCNC: 8 MG/DL (ref 7–18)
CALCIUM BLD-MCNC: 8.4 MG/DL (ref 8.5–10.1)
CHLORIDE SERPL-SCNC: 102 MMOL/L (ref 98–112)
CLARITY UR REFRACT.AUTO: CLEAR
CO2 SERPL-SCNC: 29 MMOL/L (ref 21–32)
COLOR UR AUTO: YELLOW
CREAT BLD-MCNC: 0.89 MG/DL
EOSINOPHIL # BLD AUTO: 0.02 X10(3) UL (ref 0–0.7)
ERYTHROCYTE [DISTWIDTH] IN BLOOD BY AUTOMATED COUNT: 12.7 %
EST. AVERAGE GLUCOSE BLD GHB EST-MCNC: 148 MG/DL (ref 68–126)
GLOBULIN PLAS-MCNC: 3.7 G/DL (ref 2.8–4.4)
GLUCOSE BLD-MCNC: 120 MG/DL (ref 70–99)
GLUCOSE BLD-MCNC: 123 MG/DL (ref 70–99)
GLUCOSE BLD-MCNC: 188 MG/DL (ref 70–99)
GLUCOSE UR STRIP.AUTO-MCNC: >=500 MG/DL
HBA1C MFR BLD: 6.8 % (ref ?–5.7)
HCT VFR BLD AUTO: 41.4 %
HGB BLD-MCNC: 14.2 G/DL
IMM GRANULOCYTES # BLD AUTO: 0.05 X10(3) UL (ref 0–1)
IMM GRANULOCYTES NFR BLD: 0.6 %
LEUKOCYTE ESTERASE UR QL STRIP.AUTO: NEGATIVE
LIPASE SERPL-CCNC: 1133 U/L (ref 73–393)
LYMPHOCYTES # BLD AUTO: 0.96 X10(3) UL (ref 1–4)
LYMPHOCYTES NFR BLD AUTO: 11 %
MCH RBC QN AUTO: 35.2 PG (ref 26–34)
MCHC RBC AUTO-ENTMCNC: 34.3 G/DL (ref 31–37)
MCV RBC AUTO: 102.7 FL
MONOCYTES NFR BLD AUTO: 10.9 %
NEUTROPHILS # BLD AUTO: 6.72 X10 (3) UL (ref 1.5–7.7)
NEUTROPHILS # BLD AUTO: 6.72 X10(3) UL (ref 1.5–7.7)
NEUTROPHILS NFR BLD AUTO: 77.1 %
NITRITE UR QL STRIP.AUTO: NEGATIVE
OSMOLALITY SERPL CALC.SUM OF ELEC: 287 MOSM/KG (ref 275–295)
PH UR STRIP.AUTO: 6 [PH] (ref 5–8)
PLATELET # BLD AUTO: 106 10(3)UL (ref 150–450)
POTASSIUM SERPL-SCNC: 3.3 MMOL/L (ref 3.5–5.1)
PROT SERPL-MCNC: 6.9 G/DL (ref 6.4–8.2)
PROT UR STRIP.AUTO-MCNC: NEGATIVE MG/DL
RBC # BLD AUTO: 4.03 X10(6)UL
RBC UR QL AUTO: NEGATIVE
SARS-COV-2 RNA RESP QL NAA+PROBE: NOT DETECTED
SODIUM SERPL-SCNC: 137 MMOL/L (ref 136–145)
SP GR UR STRIP.AUTO: >1.03 (ref 1–1.03)
TRIGL SERPL-MCNC: 135 MG/DL (ref 30–149)
TROPONIN I HIGH SENSITIVITY: 4 NG/L
UROBILINOGEN UR STRIP.AUTO-MCNC: 4 MG/DL
WBC # BLD AUTO: 8.7 X10(3) UL (ref 4–11)

## 2022-03-07 PROCEDURE — 99223 1ST HOSP IP/OBS HIGH 75: CPT | Performed by: INTERNAL MEDICINE

## 2022-03-07 PROCEDURE — 74177 CT ABD & PELVIS W/CONTRAST: CPT | Performed by: EMERGENCY MEDICINE

## 2022-03-07 PROCEDURE — 76700 US EXAM ABDOM COMPLETE: CPT | Performed by: EMERGENCY MEDICINE

## 2022-03-07 PROCEDURE — 3044F HG A1C LEVEL LT 7.0%: CPT | Performed by: FAMILY MEDICINE

## 2022-03-07 PROCEDURE — 71045 X-RAY EXAM CHEST 1 VIEW: CPT | Performed by: EMERGENCY MEDICINE

## 2022-03-07 RX ORDER — HYDRALAZINE HYDROCHLORIDE 20 MG/ML
10 INJECTION INTRAMUSCULAR; INTRAVENOUS EVERY 6 HOURS PRN
Status: DISCONTINUED | OUTPATIENT
Start: 2022-03-07 | End: 2022-03-08

## 2022-03-07 RX ORDER — ONDANSETRON 2 MG/ML
4 INJECTION INTRAMUSCULAR; INTRAVENOUS EVERY 6 HOURS PRN
Status: DISCONTINUED | OUTPATIENT
Start: 2022-03-07 | End: 2022-03-08

## 2022-03-07 RX ORDER — METOCLOPRAMIDE HYDROCHLORIDE 5 MG/ML
10 INJECTION INTRAMUSCULAR; INTRAVENOUS EVERY 8 HOURS PRN
Status: DISCONTINUED | OUTPATIENT
Start: 2022-03-07 | End: 2022-03-08

## 2022-03-07 RX ORDER — SODIUM CHLORIDE, SODIUM LACTATE, POTASSIUM CHLORIDE, CALCIUM CHLORIDE 600; 310; 30; 20 MG/100ML; MG/100ML; MG/100ML; MG/100ML
INJECTION, SOLUTION INTRAVENOUS CONTINUOUS
Status: DISCONTINUED | OUTPATIENT
Start: 2022-03-07 | End: 2022-03-08

## 2022-03-07 RX ORDER — HYDROMORPHONE HYDROCHLORIDE 1 MG/ML
0.2 INJECTION, SOLUTION INTRAMUSCULAR; INTRAVENOUS; SUBCUTANEOUS EVERY 2 HOUR PRN
Status: DISCONTINUED | OUTPATIENT
Start: 2022-03-07 | End: 2022-03-07

## 2022-03-07 RX ORDER — IOHEXOL 350 MG/ML
100 INJECTION, SOLUTION INTRAVENOUS
Status: COMPLETED | OUTPATIENT
Start: 2022-03-07 | End: 2022-03-07

## 2022-03-07 RX ORDER — SODIUM CHLORIDE 9 MG/ML
INJECTION, SOLUTION INTRAVENOUS CONTINUOUS
Status: DISCONTINUED | OUTPATIENT
Start: 2022-03-07 | End: 2022-03-07

## 2022-03-07 RX ORDER — HYDROMORPHONE HYDROCHLORIDE 1 MG/ML
0.4 INJECTION, SOLUTION INTRAMUSCULAR; INTRAVENOUS; SUBCUTANEOUS EVERY 2 HOUR PRN
Status: DISCONTINUED | OUTPATIENT
Start: 2022-03-07 | End: 2022-03-07

## 2022-03-07 RX ORDER — KETOROLAC TROMETHAMINE 15 MG/ML
15 INJECTION, SOLUTION INTRAMUSCULAR; INTRAVENOUS EVERY 6 HOURS PRN
Status: DISCONTINUED | OUTPATIENT
Start: 2022-03-07 | End: 2022-03-08

## 2022-03-07 RX ORDER — METFORMIN HYDROCHLORIDE 500 MG/1
1000 TABLET, EXTENDED RELEASE ORAL 2 TIMES DAILY
COMMUNITY
End: 2022-04-04

## 2022-03-07 RX ORDER — HYDROMORPHONE HYDROCHLORIDE 1 MG/ML
0.5 INJECTION, SOLUTION INTRAMUSCULAR; INTRAVENOUS; SUBCUTANEOUS EVERY 30 MIN PRN
Status: DISCONTINUED | OUTPATIENT
Start: 2022-03-07 | End: 2022-03-08

## 2022-03-07 RX ORDER — MORPHINE SULFATE 4 MG/ML
4 INJECTION, SOLUTION INTRAMUSCULAR; INTRAVENOUS EVERY 2 HOUR PRN
Status: DISCONTINUED | OUTPATIENT
Start: 2022-03-07 | End: 2022-03-08

## 2022-03-07 RX ORDER — HEPARIN SODIUM 5000 [USP'U]/ML
5000 INJECTION, SOLUTION INTRAVENOUS; SUBCUTANEOUS EVERY 8 HOURS SCHEDULED
Status: DISCONTINUED | OUTPATIENT
Start: 2022-03-07 | End: 2022-03-08

## 2022-03-07 RX ORDER — ATENOLOL 50 MG/1
50 TABLET ORAL DAILY
COMMUNITY

## 2022-03-07 RX ORDER — HYDROMORPHONE HYDROCHLORIDE 1 MG/ML
0.8 INJECTION, SOLUTION INTRAMUSCULAR; INTRAVENOUS; SUBCUTANEOUS EVERY 2 HOUR PRN
Status: DISCONTINUED | OUTPATIENT
Start: 2022-03-07 | End: 2022-03-07

## 2022-03-07 RX ORDER — GLIPIZIDE 5 MG/1
5 TABLET, FILM COATED, EXTENDED RELEASE ORAL DAILY
COMMUNITY
End: 2022-03-14

## 2022-03-07 RX ORDER — MORPHINE SULFATE 2 MG/ML
1 INJECTION, SOLUTION INTRAMUSCULAR; INTRAVENOUS EVERY 2 HOUR PRN
Status: DISCONTINUED | OUTPATIENT
Start: 2022-03-07 | End: 2022-03-08

## 2022-03-07 RX ORDER — MELATONIN
3 NIGHTLY PRN
Status: DISCONTINUED | OUTPATIENT
Start: 2022-03-07 | End: 2022-03-08

## 2022-03-07 RX ORDER — SENNOSIDES 8.6 MG
17.2 TABLET ORAL NIGHTLY PRN
Status: DISCONTINUED | OUTPATIENT
Start: 2022-03-07 | End: 2022-03-08

## 2022-03-07 RX ORDER — DEXTROSE MONOHYDRATE 25 G/50ML
50 INJECTION, SOLUTION INTRAVENOUS
Status: DISCONTINUED | OUTPATIENT
Start: 2022-03-07 | End: 2022-03-08

## 2022-03-07 RX ORDER — MULTIVITAMIN WITH FOLIC ACID 400 MCG
1 TABLET ORAL DAILY
COMMUNITY

## 2022-03-07 RX ORDER — ACETAMINOPHEN 325 MG/1
650 TABLET ORAL EVERY 6 HOURS PRN
Status: DISCONTINUED | OUTPATIENT
Start: 2022-03-07 | End: 2022-03-08

## 2022-03-07 RX ORDER — MORPHINE SULFATE 2 MG/ML
2 INJECTION, SOLUTION INTRAMUSCULAR; INTRAVENOUS EVERY 2 HOUR PRN
Status: DISCONTINUED | OUTPATIENT
Start: 2022-03-07 | End: 2022-03-08

## 2022-03-07 RX ORDER — METOPROLOL TARTRATE 5 MG/5ML
5 INJECTION INTRAVENOUS EVERY 6 HOURS
Status: DISCONTINUED | OUTPATIENT
Start: 2022-03-07 | End: 2022-03-08

## 2022-03-07 RX ORDER — NICOTINE POLACRILEX 4 MG
30 LOZENGE BUCCAL
Status: DISCONTINUED | OUTPATIENT
Start: 2022-03-07 | End: 2022-03-08

## 2022-03-07 RX ORDER — ENALAPRILAT 2.5 MG/2ML
1.25 INJECTION INTRAVENOUS EVERY 6 HOURS PRN
Status: DISCONTINUED | OUTPATIENT
Start: 2022-03-07 | End: 2022-03-08

## 2022-03-07 RX ORDER — NICOTINE POLACRILEX 4 MG
15 LOZENGE BUCCAL
Status: DISCONTINUED | OUTPATIENT
Start: 2022-03-07 | End: 2022-03-08

## 2022-03-07 RX ORDER — HYDROMORPHONE HYDROCHLORIDE 1 MG/ML
0.5 INJECTION, SOLUTION INTRAMUSCULAR; INTRAVENOUS; SUBCUTANEOUS EVERY 30 MIN PRN
Status: DISCONTINUED | OUTPATIENT
Start: 2022-03-07 | End: 2022-03-07

## 2022-03-07 RX ORDER — POLYETHYLENE GLYCOL 3350 17 G/17G
17 POWDER, FOR SOLUTION ORAL DAILY PRN
Status: DISCONTINUED | OUTPATIENT
Start: 2022-03-07 | End: 2022-03-08

## 2022-03-07 RX ORDER — ATORVASTATIN CALCIUM 20 MG/1
20 TABLET, FILM COATED ORAL NIGHTLY
COMMUNITY

## 2022-03-07 RX ORDER — LOSARTAN POTASSIUM 100 MG/1
100 TABLET ORAL DAILY
COMMUNITY

## 2022-03-07 RX ORDER — BISACODYL 10 MG
10 SUPPOSITORY, RECTAL RECTAL
Status: DISCONTINUED | OUTPATIENT
Start: 2022-03-07 | End: 2022-03-08

## 2022-03-07 RX ORDER — AMLODIPINE BESYLATE 2.5 MG/1
2.5 TABLET ORAL DAILY
COMMUNITY

## 2022-03-07 RX ORDER — SODIUM PHOSPHATE, DIBASIC AND SODIUM PHOSPHATE, MONOBASIC 7; 19 G/133ML; G/133ML
1 ENEMA RECTAL ONCE AS NEEDED
Status: DISCONTINUED | OUTPATIENT
Start: 2022-03-07 | End: 2022-03-08

## 2022-03-07 RX ORDER — ONDANSETRON 2 MG/ML
4 INJECTION INTRAMUSCULAR; INTRAVENOUS EVERY 4 HOURS PRN
Status: DISCONTINUED | OUTPATIENT
Start: 2022-03-07 | End: 2022-03-07

## 2022-03-07 NOTE — ED INITIAL ASSESSMENT (HPI)
Pt c/o of right sided abdominal pain that developed yesterday at 0600. Pt denies CP, flank pain, or N/V/D.

## 2022-03-07 NOTE — ED QUICK NOTES
Orders for admission, patient is aware of plan and ready to go upstairs. Any questions, please call ED RN Olman Sethi at extension #[phone].      Patient Covid vaccination status: Fully vaccinated     COVID Test Ordered in ED: Rapid SARS-CoV-2 by PCR    COVID Suspicion at Admission: N/A    Running Infusions:  None    Mental Status/LOC at time of transport: A&Ox4    Other pertinent information:   CIWA score: N/A   NIH score:  N/A

## 2022-03-08 ENCOUNTER — APPOINTMENT (OUTPATIENT)
Dept: MRI IMAGING | Facility: HOSPITAL | Age: 72
DRG: 440 | End: 2022-03-08
Attending: INTERNAL MEDICINE
Payer: COMMERCIAL

## 2022-03-08 VITALS
WEIGHT: 195 LBS | HEART RATE: 66 BPM | HEIGHT: 75.98 IN | SYSTOLIC BLOOD PRESSURE: 145 MMHG | TEMPERATURE: 99 F | OXYGEN SATURATION: 97 % | DIASTOLIC BLOOD PRESSURE: 101 MMHG | RESPIRATION RATE: 18 BRPM | BODY MASS INDEX: 23.75 KG/M2

## 2022-03-08 LAB
ALBUMIN SERPL-MCNC: 2.7 G/DL (ref 3.4–5)
ALBUMIN/GLOB SERPL: 0.8 {RATIO} (ref 1–2)
ALT SERPL-CCNC: 32 U/L
ANION GAP SERPL CALC-SCNC: 4 MMOL/L (ref 0–18)
AST SERPL-CCNC: 19 U/L (ref 15–37)
ATRIAL RATE: 64 BPM
BASOPHILS # BLD AUTO: 0.01 X10(3) UL (ref 0–0.2)
BASOPHILS NFR BLD AUTO: 0.2 %
BILIRUB SERPL-MCNC: 1.6 MG/DL (ref 0.1–2)
BUN BLD-MCNC: 8 MG/DL (ref 7–18)
CALCIUM BLD-MCNC: 8.3 MG/DL (ref 8.5–10.1)
CHLORIDE SERPL-SCNC: 104 MMOL/L (ref 98–112)
CO2 SERPL-SCNC: 30 MMOL/L (ref 21–32)
CREAT BLD-MCNC: 0.77 MG/DL
EOSINOPHIL # BLD AUTO: 0.06 X10(3) UL (ref 0–0.7)
EOSINOPHIL NFR BLD AUTO: 1 %
GLOBULIN PLAS-MCNC: 3.6 G/DL (ref 2.8–4.4)
GLUCOSE BLD-MCNC: 106 MG/DL (ref 70–99)
GLUCOSE BLD-MCNC: 108 MG/DL (ref 70–99)
GLUCOSE BLD-MCNC: 132 MG/DL (ref 70–99)
HCT VFR BLD AUTO: 38.7 %
HGB BLD-MCNC: 12.9 G/DL
IMM GRANULOCYTES # BLD AUTO: 0.03 X10(3) UL (ref 0–1)
IMM GRANULOCYTES NFR BLD: 0.5 %
LYMPHOCYTES # BLD AUTO: 1.56 X10(3) UL (ref 1–4)
LYMPHOCYTES NFR BLD AUTO: 26 %
MCH RBC QN AUTO: 35 PG (ref 26–34)
MCHC RBC AUTO-ENTMCNC: 33.3 G/DL (ref 31–37)
MCV RBC AUTO: 104.9 FL
MONOCYTES # BLD AUTO: 0.73 X10(3) UL (ref 0.1–1)
MONOCYTES NFR BLD AUTO: 12.2 %
NEUTROPHILS # BLD AUTO: 3.6 X10 (3) UL (ref 1.5–7.7)
NEUTROPHILS # BLD AUTO: 3.6 X10(3) UL (ref 1.5–7.7)
NEUTROPHILS NFR BLD AUTO: 60.1 %
OSMOLALITY SERPL CALC.SUM OF ELEC: 285 MOSM/KG (ref 275–295)
P AXIS: 29 DEGREES
P-R INTERVAL: 164 MS
PLATELET # BLD AUTO: 88 10(3)UL (ref 150–450)
PLATELET MORPHOLOGY: NORMAL
POTASSIUM SERPL-SCNC: 3 MMOL/L (ref 3.5–5.1)
PROT SERPL-MCNC: 6.3 G/DL (ref 6.4–8.2)
Q-T INTERVAL: 424 MS
QRS DURATION: 82 MS
QTC CALCULATION (BEZET): 437 MS
R AXIS: 2 DEGREES
RBC # BLD AUTO: 3.69 X10(6)UL
SODIUM SERPL-SCNC: 138 MMOL/L (ref 136–145)
T AXIS: 5 DEGREES
VENTRICULAR RATE: 64 BPM
WBC # BLD AUTO: 6 X10(3) UL (ref 4–11)

## 2022-03-08 PROCEDURE — 74183 MRI ABD W/O CNTR FLWD CNTR: CPT | Performed by: INTERNAL MEDICINE

## 2022-03-08 PROCEDURE — 99239 HOSP IP/OBS DSCHRG MGMT >30: CPT | Performed by: HOSPITALIST

## 2022-03-08 PROCEDURE — 76376 3D RENDER W/INTRP POSTPROCES: CPT | Performed by: INTERNAL MEDICINE

## 2022-03-08 RX ORDER — ASPIRIN 81 MG/1
81 TABLET ORAL DAILY
Refills: 0 | Status: SHIPPED | COMMUNITY
Start: 2022-03-11

## 2022-03-08 RX ORDER — POTASSIUM CHLORIDE 14.9 MG/ML
20 INJECTION INTRAVENOUS ONCE
Status: DISCONTINUED | OUTPATIENT
Start: 2022-03-08 | End: 2022-03-08

## 2022-03-08 RX ORDER — TRAMADOL HYDROCHLORIDE 50 MG/1
TABLET ORAL EVERY 8 HOURS PRN
Qty: 10 TABLET | Refills: 0 | Status: ON HOLD | OUTPATIENT
Start: 2022-03-08 | End: 2022-03-27

## 2022-03-08 NOTE — PLAN OF CARE
Pt A&Ox4. Room air. . Tele, NSR. Afebrile. Heparin. IVF. Scds. Voids, up SBA. C/o of RUQ pain, prn pain meds given. Tolerated clear liquid diet well at dinner. NPO at midnight for MRCP in am. QID accucheck. No c/o of sob, n/v/d. Pt updated on poc. No further needs at this time. Safety precautions in place WCTM.        Problem: Patient/Family Goals  Goal: Patient/Family Long Term Goal  Description: Patient's Long Term Goal: discharge home with adequate resources    Interventions:  - IVF, pain management, MRCP, tolerating diet  - See additional Care Plan goals for specific interventions  Outcome: Progressing  Goal: Patient/Family Short Term Goal  Description: Patient's Short Term Goal: 3/7NOC: manage pain    Interventions:   - prn pain medications  - See additional Care Plan goals for specific interventions  Outcome: Progressing     Problem: PAIN - ADULT  Goal: Verbalizes/displays adequate comfort level or patient's stated pain goal  Description: INTERVENTIONS:  - Encourage pt to monitor pain and request assistance  - Assess pain using appropriate pain scale  - Administer analgesics based on type and severity of pain and evaluate response  - Implement non-pharmacological measures as appropriate and evaluate response  - Consider cultural and social influences on pain and pain management  - Manage/alleviate anxiety  - Utilize distraction and/or relaxation techniques  - Monitor for opioid side effects  - Notify MD/LIP if interventions unsuccessful or patient reports new pain  - Anticipate increased pain with activity and pre-medicate as appropriate  Outcome: Progressing     Problem: RISK FOR INFECTION - ADULT  Goal: Absence of fever/infection during anticipated neutropenic period  Description: INTERVENTIONS  - Monitor WBC  - Administer growth factors as ordered  - Implement neutropenic guidelines  Outcome: Progressing     Problem: SAFETY ADULT - FALL  Goal: Free from fall injury  Description: INTERVENTIONS:  - Assess pt frequently for physical needs  - Identify cognitive and physical deficits and behaviors that affect risk of falls.   - [place] fall precautions as indicated by assessment.  - Educate pt/family on patient safety including physical limitations  - Instruct pt to call for assistance with activity based on assessment  - Modify environment to reduce risk of injury  - Provide assistive devices as appropriate  - Consider OT/PT consult to assist with strengthening/mobility  - Encourage toileting schedule  Outcome: Progressing     Problem: DISCHARGE PLANNING  Goal: Discharge to home or other facility with appropriate resources  Description: INTERVENTIONS:  - Identify barriers to discharge w/pt and caregiver  - Include patient/family/discharge partner in discharge planning  - Arrange for needed discharge resources and transportation as appropriate  - Identify discharge learning needs (meds, wound care, etc)  - Arrange for interpreters to assist at discharge as needed  - Consider post-discharge preferences of patient/family/discharge partner  - Complete POLST form as appropriate  - Assess patient's ability to be responsible for managing their own health  - Refer to Case Management Department for coordinating discharge planning if the patient needs post-hospital services based on physician/LIP order or complex needs related to functional status, cognitive ability or social support system  Outcome: Progressing

## 2022-03-08 NOTE — PROGRESS NOTES
Patient alert and oriented x4. VSS. Tele NSR. :RA. Reports only mild pain 3/10 to RUQ at this time. Clear liq diet according to GI. Advance to full liquid tomorrow if tolerating. MRCP ordered. MRI screening form completed. LR at 150ml/hr running. Up standby assist. Updated on plan of care and condition. No other needs at this time.

## 2022-03-08 NOTE — PROGRESS NOTES
NURSING DISCHARGE NOTE    Discharged Home via Wheelchair. Accompanied by Support staff  Belongings Taken by patient/family. Patient discharged home in stable condition. PIV removed. Discharge instructions, including f/u appts, new medications, and alcohol cessation reviewed with pt, pt verbalized understanding. Pt drove himself to the hospital and feels comfortable driving himself home.

## 2022-03-09 ENCOUNTER — PATIENT OUTREACH (OUTPATIENT)
Dept: CASE MANAGEMENT | Age: 72
End: 2022-03-09

## 2022-03-09 PROCEDURE — 1111F DSCHRG MED/CURRENT MED MERGE: CPT

## 2022-03-10 LAB
IMMUNOGLOBULIN G SUBCLASS 1: 648 MG/DL
IMMUNOGLOBULIN G SUBCLASS 2: 283 MG/DL
IMMUNOGLOBULIN G SUBCLASS 3: 68 MG/DL
IMMUNOGLOBULIN G SUBCLASS 4: 53 MG/DL

## 2022-03-14 RX ORDER — GLIPIZIDE 5 MG/1
TABLET, FILM COATED, EXTENDED RELEASE ORAL
Qty: 90 TABLET | Refills: 0 | Status: SHIPPED | OUTPATIENT
Start: 2022-03-14

## 2022-03-17 ENCOUNTER — TELEPHONE (OUTPATIENT)
Dept: INTERNAL MEDICINE CLINIC | Facility: CLINIC | Age: 72
End: 2022-03-17

## 2022-03-17 NOTE — TELEPHONE ENCOUNTER
[facility] ED for missed [facility] [phone]

## 2022-03-22 ENCOUNTER — TELEPHONE (OUTPATIENT)
Dept: INTERNAL MEDICINE CLINIC | Facility: CLINIC | Age: 72
End: 2022-03-22

## 2022-03-22 ENCOUNTER — LAB ENCOUNTER (OUTPATIENT)
Dept: LAB | Age: 72
End: 2022-03-22
Attending: PHYSICIAN ASSISTANT
Payer: COMMERCIAL

## 2022-03-26 ENCOUNTER — APPOINTMENT (OUTPATIENT)
Dept: CT IMAGING | Facility: HOSPITAL | Age: 72
DRG: 440 | End: 2022-03-26
Attending: EMERGENCY MEDICINE
Payer: COMMERCIAL

## 2022-03-26 ENCOUNTER — HOSPITAL ENCOUNTER (INPATIENT)
Facility: HOSPITAL | Age: 72
LOS: 3 days | Discharge: HOME | DRG: 440 | End: 2022-03-30
Attending: EMERGENCY MEDICINE | Admitting: HOSPITALIST
Payer: COMMERCIAL

## 2022-03-26 DIAGNOSIS — M54.50 BACK PAIN OF THORACOLUMBAR REGION: ICD-10-CM

## 2022-03-26 DIAGNOSIS — M54.6 BACK PAIN OF THORACOLUMBAR REGION: ICD-10-CM

## 2022-03-26 DIAGNOSIS — K86.89 PANCREATIC MASS: Primary | ICD-10-CM

## 2022-03-26 LAB
ALBUMIN SERPL-MCNC: 2.7 G/DL (ref 3.4–5)
ALBUMIN/GLOB SERPL: 0.5 {RATIO} (ref 1–2)
ALP LIVER SERPL-CCNC: 89 U/L
ALT SERPL-CCNC: 23 U/L
ANION GAP SERPL CALC-SCNC: 13 MMOL/L (ref 0–18)
AST SERPL-CCNC: 25 U/L (ref 15–37)
BASOPHILS # BLD AUTO: 0.01 X10(3) UL (ref 0–0.2)
BASOPHILS NFR BLD AUTO: 0.1 %
BILIRUB SERPL-MCNC: 0.8 MG/DL (ref 0.1–2)
BILIRUB UR QL STRIP.AUTO: NEGATIVE
BUN BLD-MCNC: 13 MG/DL (ref 7–18)
CALCIUM BLD-MCNC: 9.5 MG/DL (ref 8.5–10.1)
CHLORIDE SERPL-SCNC: 100 MMOL/L (ref 98–112)
CLARITY UR REFRACT.AUTO: CLEAR
CO2 SERPL-SCNC: 23 MMOL/L (ref 21–32)
COLOR UR AUTO: YELLOW
CREAT BLD-MCNC: 0.98 MG/DL
EOSINOPHIL # BLD AUTO: 0.02 X10(3) UL (ref 0–0.7)
EOSINOPHIL NFR BLD AUTO: 0.2 %
ERYTHROCYTE [DISTWIDTH] IN BLOOD BY AUTOMATED COUNT: 11.9 %
ETHANOL SERPL-MCNC: <3 MG/DL (ref ?–3)
GLOBULIN PLAS-MCNC: 5.2 G/DL (ref 2.8–4.4)
GLUCOSE BLD-MCNC: 194 MG/DL (ref 70–99)
GLUCOSE UR STRIP.AUTO-MCNC: >=500 MG/DL
HCT VFR BLD AUTO: 39 %
HGB BLD-MCNC: 13.4 G/DL
IMM GRANULOCYTES # BLD AUTO: 0.04 X10(3) UL (ref 0–1)
IMM GRANULOCYTES NFR BLD: 0.5 %
KETONES UR STRIP.AUTO-MCNC: 20 MG/DL
LEUKOCYTE ESTERASE UR QL STRIP.AUTO: NEGATIVE
LIPASE SERPL-CCNC: 27 U/L (ref 73–393)
LYMPHOCYTES # BLD AUTO: 1.07 X10(3) UL (ref 1–4)
LYMPHOCYTES NFR BLD AUTO: 12.6 %
MCH RBC QN AUTO: 34.1 PG (ref 26–34)
MCHC RBC AUTO-ENTMCNC: 34.4 G/DL (ref 31–37)
MCV RBC AUTO: 99.2 FL
MONOCYTES # BLD AUTO: 1.24 X10(3) UL (ref 0.1–1)
MONOCYTES NFR BLD AUTO: 14.6 %
NEUTROPHILS # BLD AUTO: 6.09 X10 (3) UL (ref 1.5–7.7)
NEUTROPHILS # BLD AUTO: 6.09 X10(3) UL (ref 1.5–7.7)
NEUTROPHILS NFR BLD AUTO: 72 %
NITRITE UR QL STRIP.AUTO: NEGATIVE
OSMOLALITY SERPL CALC.SUM OF ELEC: 287 MOSM/KG (ref 275–295)
PH UR STRIP.AUTO: 5 [PH] (ref 5–8)
PLATELET # BLD AUTO: 194 10(3)UL (ref 150–450)
POTASSIUM SERPL-SCNC: 4 MMOL/L (ref 3.5–5.1)
PROT SERPL-MCNC: 7.9 G/DL (ref 6.4–8.2)
PROT UR STRIP.AUTO-MCNC: 30 MG/DL
RBC # BLD AUTO: 3.93 X10(6)UL
RBC UR QL AUTO: NEGATIVE
SARS-COV-2 RNA RESP QL NAA+PROBE: NOT DETECTED
SODIUM SERPL-SCNC: 136 MMOL/L (ref 136–145)
SP GR UR STRIP.AUTO: 1.03 (ref 1–1.03)
UROBILINOGEN UR STRIP.AUTO-MCNC: 4 MG/DL
WBC # BLD AUTO: 8.5 X10(3) UL (ref 4–11)

## 2022-03-26 PROCEDURE — 80053 COMPREHEN METABOLIC PANEL: CPT | Performed by: EMERGENCY MEDICINE

## 2022-03-26 PROCEDURE — 99285 EMERGENCY DEPT VISIT HI MDM: CPT | Performed by: EMERGENCY MEDICINE

## 2022-03-26 PROCEDURE — 96361 HYDRATE IV INFUSION ADD-ON: CPT | Performed by: EMERGENCY MEDICINE

## 2022-03-26 PROCEDURE — 96374 THER/PROPH/DIAG INJ IV PUSH: CPT | Performed by: EMERGENCY MEDICINE

## 2022-03-26 PROCEDURE — 82077 ASSAY SPEC XCP UR&BREATH IA: CPT | Performed by: EMERGENCY MEDICINE

## 2022-03-26 PROCEDURE — 85025 COMPLETE CBC W/AUTO DIFF WBC: CPT | Performed by: EMERGENCY MEDICINE

## 2022-03-26 PROCEDURE — 81001 URINALYSIS AUTO W/SCOPE: CPT | Performed by: EMERGENCY MEDICINE

## 2022-03-26 PROCEDURE — 70450 CT HEAD/BRAIN W/O DYE: CPT | Performed by: EMERGENCY MEDICINE

## 2022-03-26 PROCEDURE — 80053 COMPREHEN METABOLIC PANEL: CPT

## 2022-03-26 PROCEDURE — 84145 PROCALCITONIN (PCT): CPT | Performed by: HOSPITALIST

## 2022-03-26 PROCEDURE — 83690 ASSAY OF LIPASE: CPT | Performed by: EMERGENCY MEDICINE

## 2022-03-26 PROCEDURE — 83690 ASSAY OF LIPASE: CPT

## 2022-03-26 PROCEDURE — 96376 TX/PRO/DX INJ SAME DRUG ADON: CPT | Performed by: EMERGENCY MEDICINE

## 2022-03-26 PROCEDURE — 74177 CT ABD & PELVIS W/CONTRAST: CPT | Performed by: EMERGENCY MEDICINE

## 2022-03-26 PROCEDURE — 85025 COMPLETE CBC W/AUTO DIFF WBC: CPT

## 2022-03-26 RX ORDER — SODIUM CHLORIDE 9 MG/ML
INJECTION, SOLUTION INTRAVENOUS CONTINUOUS
Status: CANCELLED | OUTPATIENT
Start: 2022-03-26

## 2022-03-26 RX ORDER — ASPIRIN 81 MG/1
81 TABLET ORAL DAILY
Status: CANCELLED | OUTPATIENT
Start: 2022-03-26

## 2022-03-26 RX ORDER — NICOTINE POLACRILEX 4 MG
15 LOZENGE BUCCAL
Status: CANCELLED | OUTPATIENT
Start: 2022-03-26

## 2022-03-26 RX ORDER — AMLODIPINE BESYLATE 5 MG/1
2.5 TABLET ORAL DAILY
Status: CANCELLED | OUTPATIENT
Start: 2022-03-26

## 2022-03-26 RX ORDER — DEXTROSE MONOHYDRATE 25 G/50ML
50 INJECTION, SOLUTION INTRAVENOUS
Status: CANCELLED | OUTPATIENT
Start: 2022-03-26

## 2022-03-26 RX ORDER — ATORVASTATIN CALCIUM 20 MG/1
20 TABLET, FILM COATED ORAL NIGHTLY
Status: CANCELLED | OUTPATIENT
Start: 2022-03-26

## 2022-03-26 RX ORDER — PANTOPRAZOLE SODIUM 40 MG/1
40 TABLET, DELAYED RELEASE ORAL
Status: CANCELLED | OUTPATIENT
Start: 2022-03-27

## 2022-03-26 RX ORDER — MORPHINE SULFATE 2 MG/ML
2 INJECTION, SOLUTION INTRAMUSCULAR; INTRAVENOUS ONCE
Status: COMPLETED | OUTPATIENT
Start: 2022-03-26 | End: 2022-03-26

## 2022-03-26 RX ORDER — TRAMADOL HYDROCHLORIDE 50 MG/1
TABLET ORAL EVERY 8 HOURS PRN
Status: CANCELLED | OUTPATIENT
Start: 2022-03-26

## 2022-03-26 RX ORDER — IOHEXOL 350 MG/ML
100 INJECTION, SOLUTION INTRAVENOUS
Status: COMPLETED | OUTPATIENT
Start: 2022-03-26 | End: 2022-03-26

## 2022-03-26 RX ORDER — NICOTINE POLACRILEX 4 MG
30 LOZENGE BUCCAL
Status: CANCELLED | OUTPATIENT
Start: 2022-03-26

## 2022-03-26 RX ORDER — ATENOLOL 50 MG/1
50 TABLET ORAL DAILY
Status: CANCELLED | OUTPATIENT
Start: 2022-03-26

## 2022-03-26 RX ORDER — MORPHINE SULFATE 4 MG/ML
4 INJECTION, SOLUTION INTRAMUSCULAR; INTRAVENOUS ONCE
Status: COMPLETED | OUTPATIENT
Start: 2022-03-26 | End: 2022-03-27

## 2022-03-26 RX ORDER — LOSARTAN POTASSIUM 50 MG/1
100 TABLET ORAL DAILY
Status: CANCELLED | OUTPATIENT
Start: 2022-03-26

## 2022-03-27 PROBLEM — K86.1 CHRONIC PANCREATITIS (HCC): Status: ACTIVE | Noted: 2019-11-20

## 2022-03-27 PROBLEM — M54.6 BACK PAIN OF THORACOLUMBAR REGION: Status: ACTIVE | Noted: 2022-03-27

## 2022-03-27 PROBLEM — M54.50 BACK PAIN OF THORACOLUMBAR REGION: Status: ACTIVE | Noted: 2022-03-27

## 2022-03-27 LAB
CANCER AG19-9 SERPL-ACNC: 44.9 U/ML (ref ?–37)
GLUCOSE BLD-MCNC: 130 MG/DL (ref 70–99)
GLUCOSE BLD-MCNC: 138 MG/DL (ref 70–99)
GLUCOSE BLD-MCNC: 140 MG/DL (ref 70–99)
GLUCOSE BLD-MCNC: 260 MG/DL (ref 70–99)
PROCALCITONIN SERPL-MCNC: 0.75 NG/ML (ref ?–0.16)

## 2022-03-27 PROCEDURE — 82962 GLUCOSE BLOOD TEST: CPT

## 2022-03-27 PROCEDURE — 83521 IG LIGHT CHAINS FREE EACH: CPT | Performed by: INTERNAL MEDICINE

## 2022-03-27 PROCEDURE — 86334 IMMUNOFIX E-PHORESIS SERUM: CPT | Performed by: INTERNAL MEDICINE

## 2022-03-27 PROCEDURE — 86301 IMMUNOASSAY TUMOR CA 19-9: CPT | Performed by: STUDENT IN AN ORGANIZED HEALTH CARE EDUCATION/TRAINING PROGRAM

## 2022-03-27 PROCEDURE — 84165 PROTEIN E-PHORESIS SERUM: CPT | Performed by: INTERNAL MEDICINE

## 2022-03-27 PROCEDURE — 87040 BLOOD CULTURE FOR BACTERIA: CPT | Performed by: HOSPITALIST

## 2022-03-27 RX ORDER — SODIUM CHLORIDE, SODIUM LACTATE, POTASSIUM CHLORIDE, CALCIUM CHLORIDE 600; 310; 30; 20 MG/100ML; MG/100ML; MG/100ML; MG/100ML
INJECTION, SOLUTION INTRAVENOUS CONTINUOUS
Status: DISCONTINUED | OUTPATIENT
Start: 2022-03-27 | End: 2022-03-27

## 2022-03-27 RX ORDER — SODIUM CHLORIDE, SODIUM LACTATE, POTASSIUM CHLORIDE, CALCIUM CHLORIDE 600; 310; 30; 20 MG/100ML; MG/100ML; MG/100ML; MG/100ML
INJECTION, SOLUTION INTRAVENOUS CONTINUOUS
Status: DISCONTINUED | OUTPATIENT
Start: 2022-03-27 | End: 2022-03-30

## 2022-03-27 RX ORDER — ONDANSETRON 2 MG/ML
4 INJECTION INTRAMUSCULAR; INTRAVENOUS EVERY 6 HOURS PRN
Status: DISCONTINUED | OUTPATIENT
Start: 2022-03-27 | End: 2022-03-30

## 2022-03-27 RX ORDER — PANTOPRAZOLE SODIUM 40 MG/1
40 TABLET, DELAYED RELEASE ORAL
Status: DISCONTINUED | OUTPATIENT
Start: 2022-03-27 | End: 2022-03-30

## 2022-03-27 RX ORDER — AMLODIPINE BESYLATE 2.5 MG/1
2.5 TABLET ORAL DAILY
Status: DISCONTINUED | OUTPATIENT
Start: 2022-03-27 | End: 2022-03-30

## 2022-03-27 RX ORDER — MORPHINE SULFATE 2 MG/ML
2 INJECTION, SOLUTION INTRAMUSCULAR; INTRAVENOUS EVERY 2 HOUR PRN
Status: DISCONTINUED | OUTPATIENT
Start: 2022-03-27 | End: 2022-03-30

## 2022-03-27 RX ORDER — ACETAMINOPHEN 500 MG
500 TABLET ORAL EVERY 6 HOURS PRN
Status: DISCONTINUED | OUTPATIENT
Start: 2022-03-27 | End: 2022-03-30

## 2022-03-27 RX ORDER — ACETAMINOPHEN 325 MG/1
650 TABLET ORAL EVERY 6 HOURS PRN
Status: DISCONTINUED | OUTPATIENT
Start: 2022-03-27 | End: 2022-03-30

## 2022-03-27 RX ORDER — ONDANSETRON 2 MG/ML
4 INJECTION INTRAMUSCULAR; INTRAVENOUS EVERY 4 HOURS PRN
Status: ACTIVE | OUTPATIENT
Start: 2022-03-27 | End: 2022-03-27

## 2022-03-27 RX ORDER — DEXTROSE MONOHYDRATE 25 G/50ML
50 INJECTION, SOLUTION INTRAVENOUS
Status: DISCONTINUED | OUTPATIENT
Start: 2022-03-27 | End: 2022-03-30

## 2022-03-27 RX ORDER — ENOXAPARIN SODIUM 100 MG/ML
40 INJECTION SUBCUTANEOUS DAILY
Status: DISCONTINUED | OUTPATIENT
Start: 2022-03-27 | End: 2022-03-30

## 2022-03-27 RX ORDER — NICOTINE POLACRILEX 4 MG
30 LOZENGE BUCCAL
Status: DISCONTINUED | OUTPATIENT
Start: 2022-03-27 | End: 2022-03-30

## 2022-03-27 RX ORDER — METOCLOPRAMIDE HYDROCHLORIDE 5 MG/ML
10 INJECTION INTRAMUSCULAR; INTRAVENOUS EVERY 8 HOURS PRN
Status: DISCONTINUED | OUTPATIENT
Start: 2022-03-27 | End: 2022-03-30

## 2022-03-27 RX ORDER — MORPHINE SULFATE 2 MG/ML
1 INJECTION, SOLUTION INTRAMUSCULAR; INTRAVENOUS EVERY 2 HOUR PRN
Status: DISCONTINUED | OUTPATIENT
Start: 2022-03-27 | End: 2022-03-30

## 2022-03-27 RX ORDER — MORPHINE SULFATE 4 MG/ML
4 INJECTION, SOLUTION INTRAMUSCULAR; INTRAVENOUS EVERY 2 HOUR PRN
Status: DISCONTINUED | OUTPATIENT
Start: 2022-03-27 | End: 2022-03-30

## 2022-03-27 RX ORDER — ATENOLOL 50 MG/1
50 TABLET ORAL
Status: DISCONTINUED | OUTPATIENT
Start: 2022-03-27 | End: 2022-03-30

## 2022-03-27 RX ORDER — DOCUSATE SODIUM 100 MG/1
100 CAPSULE, LIQUID FILLED ORAL 2 TIMES DAILY
Status: DISCONTINUED | OUTPATIENT
Start: 2022-03-27 | End: 2022-03-30

## 2022-03-27 RX ORDER — NICOTINE POLACRILEX 4 MG
15 LOZENGE BUCCAL
Status: DISCONTINUED | OUTPATIENT
Start: 2022-03-27 | End: 2022-03-30

## 2022-03-27 NOTE — PLAN OF CARE
Problem: Patient/Family Goals  Goal: Patient/Family Short Term Goal  Description: Patient's Short Term Goal: Hav back pain improve and have fever resolve    Interventions:   - IV Antibiotics  -IV Fluids  -Monitor fever  -GI consult  - See additional Care Plan goals for specific interventions  Outcome: Progressing        Problem: GASTROINTESTINAL - ADULT  Goal: Minimal or absence of nausea and vomiting  Description: INTERVENTIONS:  - Maintain adequate hydration with IV or PO as ordered and tolerated  - Nasogastric tube to low intermittent suction as ordered  - Evaluate effectiveness of ordered antiemetic medications  - Provide nonpharmacologic comfort measures as appropriate  - Advance diet as tolerated, if ordered  - Obtain nutritional consult as needed  - Evaluate fluid balance  Outcome: Progressing     Problem: MUSCULOSKELETAL - ADULT  Goal: Return mobility to safest level of function  Description: INTERVENTIONS:  - Assess patient stability and activity tolerance for standing, transferring and ambulating w/ or w/o assistive devices  - Assist with transfers and ambulation using safe patient handling equipment as needed  - Ensure adequate protection for wounds/incisions during mobilization  - Obtain PT/OT consults as needed  - Advance activity as appropriate  - Communicate ordered activity level and limitations with patient/family  Outcome: Progressing  Goal: Maintain proper alignment of affected body part  Description: INTERVENTIONS:  - Support and protect limb and body alignment per provider's orders  - Instruct and reinforce with patient and family use of appropriate assistive device and precautions (e.g. spinal or hip dislocation precautions)  Outcome: Progressing     Problem: PAIN - ADULT  Goal: Verbalizes/displays adequate comfort level or patient's stated pain goal  Description: INTERVENTION AS NEEDED:  - Encourage patient to monitor pain and request assistance  - Assess pain using appropriate pain scale  - Administer analgesics based on type and severity of pain and evaluate response  - Implement non-pharmacological measures as appropriate and evaluate response  - Consider cultural and social influences on pain and pain management  - Manage/alleviate anxiety  - Utilize distraction and/or relaxation techniques  - Monitor for opioid side effects  - Notify internist if intervention unsuccessful or patient reports new pain  - Anticipate increased pain with activity and pre-medicate as appropriate  - Responsible Professional:  RN    - Evaluate and order additional treatment as indicated  - Responsible Professional: MD  INTERVENTIONS:  - Encourage pt to monitor pain and request assistance  - Assess pain using appropriate pain scale  - Administer analgesics based on type and severity of pain and evaluate response  - Implement non-pharmacological measures as appropriate and evaluate response  - Consider cultural and social influences on pain and pain management  - Manage/alleviate anxiety  - Utilize distraction and/or relaxation techniques  - Monitor for opioid side effects  - Notify MD/LIP if interventions unsuccessful or patient reports new pain  - Anticipate increased pain with activity and pre-medicate as appropriate  Outcome: Progressing     Problem: RISK FOR INFECTION - ADULT  Goal: Absence of fever/infection during anticipated neutropenic period  Description: INTERVENTIONS  - Monitor WBC  - Administer growth factors as ordered  - Implement neutropenic guidelines  Outcome: Progressing            Pt is alert and oriented x 4. Vitals stable. Still with low grade temperature. Npo maintained. IV antibiotics started. C/o Low back pain , IV Morphine given as needed with some pain relief. GI consult notified to  with no ne orders. Plan of care updated to the pt and family. Will continue to monitor. 1800 : Started on diet for dinner. To keep pt NPO after midnight.  Consent For Endoscopic Ultrasound with possible biopsy obtained from the pt.

## 2022-03-27 NOTE — ED QUICK NOTES
Orders for admission, patient is aware of plan and ready to go upstairs. Any questions, please call ED RN Edda Cockayne  at extension [phone]    Patient Covid vaccination status: Fully vaccinated     COVID Test Ordered in ED: Rapid SARS-CoV-2 by PCR    COVID Suspicion at Admission: N/A    Running Infusions:  None    Mental Status/LOC at time of transport: A/O X 3    Other pertinent information: PT AWARE OF RECENT CT RESULTS.   CIWA score: N/A   NIH score:  N/A

## 2022-03-27 NOTE — ED INITIAL ASSESSMENT (HPI)
Pt c/o chronic LBP, states worse tonight, states had pancreatitis a few weeks ago with similar pain. Denies N/V/D.

## 2022-03-27 NOTE — PLAN OF CARE
NURSING ADMISSION NOTE    Patient admitted via Cart  Oriented to room. Safety precautions initiated. Bed in low position. Call light in reach. Pt received from ED. A/o x3. VSS on RA. Temp 101.3- ice packs given. Pain on back reported. No nausea reported, however pt did have 1 emesis occurrence. Pt with unsteady balance. Voids clear yellow urine- increased urgency to urinate with small amounts each time. Admission navigator completed with help of wife at bedside. Fall risk protocol followed, call light within reach. 200: MD paged for meds, temp of 101.3 and emesis. 1062: MD paged for temp 102.3, PRN tylenol given.      9104: temp recheck 99.4

## 2022-03-28 ENCOUNTER — TELEPHONE (OUTPATIENT)
Dept: INTERNAL MEDICINE CLINIC | Facility: CLINIC | Age: 72
End: 2022-03-28

## 2022-03-28 ENCOUNTER — ANESTHESIA (OUTPATIENT)
Dept: ENDOSCOPY | Facility: HOSPITAL | Age: 72
DRG: 440 | End: 2022-03-28
Payer: COMMERCIAL

## 2022-03-28 ENCOUNTER — ANESTHESIA EVENT (OUTPATIENT)
Dept: ENDOSCOPY | Facility: HOSPITAL | Age: 72
DRG: 440 | End: 2022-03-28
Payer: COMMERCIAL

## 2022-03-28 ENCOUNTER — APPOINTMENT (OUTPATIENT)
Dept: CT IMAGING | Facility: HOSPITAL | Age: 72
DRG: 440 | End: 2022-03-28
Attending: INTERNAL MEDICINE
Payer: COMMERCIAL

## 2022-03-28 LAB
ALBUMIN SERPL-MCNC: 2.1 G/DL (ref 3.4–5)
ALBUMIN/GLOB SERPL: 0.5 {RATIO} (ref 1–2)
ALP LIVER SERPL-CCNC: 84 U/L
ALT SERPL-CCNC: 26 U/L
ANION GAP SERPL CALC-SCNC: 10 MMOL/L (ref 0–18)
AST SERPL-CCNC: 24 U/L (ref 15–37)
BASOPHILS # BLD AUTO: 0.02 X10(3) UL (ref 0–0.2)
BASOPHILS NFR BLD AUTO: 0.2 %
BILIRUB SERPL-MCNC: 0.8 MG/DL (ref 0.1–2)
BUN BLD-MCNC: 9 MG/DL (ref 7–18)
CALCIUM BLD-MCNC: 9 MG/DL (ref 8.5–10.1)
CHLORIDE SERPL-SCNC: 102 MMOL/L (ref 98–112)
CO2 SERPL-SCNC: 24 MMOL/L (ref 21–32)
CREAT BLD-MCNC: 0.72 MG/DL
EOSINOPHIL # BLD AUTO: 0.04 X10(3) UL (ref 0–0.7)
EOSINOPHIL NFR BLD AUTO: 0.4 %
ERYTHROCYTE [DISTWIDTH] IN BLOOD BY AUTOMATED COUNT: 12.2 %
FOLATE SERPL-MCNC: 30.6 NG/ML (ref 8.7–?)
GLOBULIN PLAS-MCNC: 4.6 G/DL (ref 2.8–4.4)
GLUCOSE BLD-MCNC: 130 MG/DL (ref 70–99)
GLUCOSE BLD-MCNC: 144 MG/DL (ref 70–99)
GLUCOSE BLD-MCNC: 145 MG/DL (ref 70–99)
GLUCOSE BLD-MCNC: 149 MG/DL (ref 70–99)
GLUCOSE BLD-MCNC: 152 MG/DL (ref 70–99)
HCT VFR BLD AUTO: 39.2 %
HGB BLD-MCNC: 12.2 G/DL
IMM GRANULOCYTES # BLD AUTO: 0.03 X10(3) UL (ref 0–1)
IMM GRANULOCYTES NFR BLD: 0.3 %
LDH SERPL L TO P-CCNC: 113 U/L
LYMPHOCYTES # BLD AUTO: 1.44 X10(3) UL (ref 1–4)
LYMPHOCYTES NFR BLD AUTO: 15.2 %
MCH RBC QN AUTO: 32.7 PG (ref 26–34)
MCHC RBC AUTO-ENTMCNC: 31.1 G/DL (ref 31–37)
MCV RBC AUTO: 105.1 FL
MONOCYTES # BLD AUTO: 1.21 X10(3) UL (ref 0.1–1)
MONOCYTES NFR BLD AUTO: 12.8 %
NEUTROPHILS # BLD AUTO: 6.75 X10 (3) UL (ref 1.5–7.7)
NEUTROPHILS # BLD AUTO: 6.75 X10(3) UL (ref 1.5–7.7)
NEUTROPHILS NFR BLD AUTO: 71.1 %
OSMOLALITY SERPL CALC.SUM OF ELEC: 283 MOSM/KG (ref 275–295)
PLATELET # BLD AUTO: 172 10(3)UL (ref 150–450)
POTASSIUM SERPL-SCNC: 4 MMOL/L (ref 3.5–5.1)
PROT SERPL-MCNC: 6.7 G/DL (ref 6.4–8.2)
PSA SERPL-MCNC: 0.66 NG/ML (ref ?–4)
RBC # BLD AUTO: 3.73 X10(6)UL
SODIUM SERPL-SCNC: 136 MMOL/L (ref 136–145)
VIT B12 SERPL-MCNC: 1969 PG/ML (ref 193–986)
WBC # BLD AUTO: 9.5 X10(3) UL (ref 4–11)

## 2022-03-28 PROCEDURE — 71260 CT THORAX DX C+: CPT | Performed by: INTERNAL MEDICINE

## 2022-03-28 PROCEDURE — 0DJ08ZZ INSPECTION OF UPPER INTESTINAL TRACT, VIA NATURAL OR ARTIFICIAL OPENING ENDOSCOPIC: ICD-10-PCS | Performed by: INTERNAL MEDICINE

## 2022-03-28 PROCEDURE — 97530 THERAPEUTIC ACTIVITIES: CPT

## 2022-03-28 PROCEDURE — 0DB58ZX EXCISION OF ESOPHAGUS, VIA NATURAL OR ARTIFICIAL OPENING ENDOSCOPIC, DIAGNOSTIC: ICD-10-PCS | Performed by: INTERNAL MEDICINE

## 2022-03-28 PROCEDURE — 80053 COMPREHEN METABOLIC PANEL: CPT | Performed by: HOSPITALIST

## 2022-03-28 PROCEDURE — 97161 PT EVAL LOW COMPLEX 20 MIN: CPT

## 2022-03-28 PROCEDURE — 82607 VITAMIN B-12: CPT | Performed by: INTERNAL MEDICINE

## 2022-03-28 PROCEDURE — 82746 ASSAY OF FOLIC ACID SERUM: CPT | Performed by: INTERNAL MEDICINE

## 2022-03-28 PROCEDURE — 70491 CT SOFT TISSUE NECK W/DYE: CPT | Performed by: INTERNAL MEDICINE

## 2022-03-28 PROCEDURE — 84153 ASSAY OF PSA TOTAL: CPT | Performed by: INTERNAL MEDICINE

## 2022-03-28 PROCEDURE — 83615 LACTATE (LD) (LDH) ENZYME: CPT | Performed by: INTERNAL MEDICINE

## 2022-03-28 PROCEDURE — 82962 GLUCOSE BLOOD TEST: CPT

## 2022-03-28 PROCEDURE — 85025 COMPLETE CBC W/AUTO DIFF WBC: CPT | Performed by: HOSPITALIST

## 2022-03-28 PROCEDURE — 88305 TISSUE EXAM BY PATHOLOGIST: CPT | Performed by: INTERNAL MEDICINE

## 2022-03-28 RX ORDER — IOHEXOL 350 MG/ML
100 INJECTION, SOLUTION INTRAVENOUS
Status: COMPLETED | OUTPATIENT
Start: 2022-03-28 | End: 2022-03-28

## 2022-03-28 RX ORDER — LABETALOL HYDROCHLORIDE 5 MG/ML
5 INJECTION, SOLUTION INTRAVENOUS EVERY 5 MIN PRN
Status: DISCONTINUED | OUTPATIENT
Start: 2022-03-28 | End: 2022-03-28 | Stop reason: HOSPADM

## 2022-03-28 RX ORDER — ONDANSETRON 2 MG/ML
4 INJECTION INTRAMUSCULAR; INTRAVENOUS AS NEEDED
Status: DISCONTINUED | OUTPATIENT
Start: 2022-03-28 | End: 2022-03-28 | Stop reason: HOSPADM

## 2022-03-28 RX ORDER — LIDOCAINE HYDROCHLORIDE 10 MG/ML
INJECTION, SOLUTION EPIDURAL; INFILTRATION; INTRACAUDAL; PERINEURAL AS NEEDED
Status: DISCONTINUED | OUTPATIENT
Start: 2022-03-28 | End: 2022-03-28 | Stop reason: SURG

## 2022-03-28 RX ORDER — SODIUM CHLORIDE, SODIUM LACTATE, POTASSIUM CHLORIDE, CALCIUM CHLORIDE 600; 310; 30; 20 MG/100ML; MG/100ML; MG/100ML; MG/100ML
INJECTION, SOLUTION INTRAVENOUS CONTINUOUS
Status: DISCONTINUED | OUTPATIENT
Start: 2022-03-28 | End: 2022-03-30

## 2022-03-28 RX ORDER — METOCLOPRAMIDE HYDROCHLORIDE 5 MG/ML
10 INJECTION INTRAMUSCULAR; INTRAVENOUS AS NEEDED
Status: DISCONTINUED | OUTPATIENT
Start: 2022-03-28 | End: 2022-03-28 | Stop reason: HOSPADM

## 2022-03-28 RX ORDER — HYDROMORPHONE HYDROCHLORIDE 1 MG/ML
0.2 INJECTION, SOLUTION INTRAMUSCULAR; INTRAVENOUS; SUBCUTANEOUS EVERY 5 MIN PRN
Status: DISCONTINUED | OUTPATIENT
Start: 2022-03-28 | End: 2022-03-28 | Stop reason: HOSPADM

## 2022-03-28 RX ORDER — NALOXONE HYDROCHLORIDE 0.4 MG/ML
80 INJECTION, SOLUTION INTRAMUSCULAR; INTRAVENOUS; SUBCUTANEOUS AS NEEDED
Status: DISCONTINUED | OUTPATIENT
Start: 2022-03-28 | End: 2022-03-28 | Stop reason: HOSPADM

## 2022-03-28 RX ADMIN — SODIUM CHLORIDE, SODIUM LACTATE, POTASSIUM CHLORIDE, CALCIUM CHLORIDE: 600; 310; 30; 20 INJECTION, SOLUTION INTRAVENOUS at 08:28:00

## 2022-03-28 RX ADMIN — SODIUM CHLORIDE, SODIUM LACTATE, POTASSIUM CHLORIDE, CALCIUM CHLORIDE: 600; 310; 30; 20 INJECTION, SOLUTION INTRAVENOUS at 08:12:00

## 2022-03-28 RX ADMIN — LIDOCAINE HYDROCHLORIDE 50 MG: 10 INJECTION, SOLUTION EPIDURAL; INFILTRATION; INTRACAUDAL; PERINEURAL at 08:16:00

## 2022-03-28 NOTE — PLAN OF CARE
Pt received A&Ox3-4, forgetful at times. Wife at bedside. Afebrile. VSS. Denies pain. Pt to endo this AM. Started on CLD per GI. Tolerating well. Consult placed to ID. Dr. Marilin Wade at the bedside. Heme/onc consulted per orders. Up in halls w/ PT. Recommending home for pt. Orthostatic vitals WNL. IV zosyn per MAR. LR infusing @ 125ml/hr. Fall precautions in place. Call light in reach.

## 2022-03-28 NOTE — PLAN OF CARE
Pt a/o x3. VSS on RA. No n/v. Pain 7/10- PRN morphine given. Meds/abx taken per [facility] and tolerated. NPO maintained for procedure. Wife at bedside. Tmax: 101.6- PRN tylenol given per MAR. Recheck 99.8. Fall risk protocol followed, call light within reach.      Problem: GASTROINTESTINAL - ADULT  Goal: Minimal or absence of nausea and vomiting  Description: INTERVENTIONS:  - Maintain adequate hydration with IV or PO as ordered and tolerated  - Nasogastric tube to low intermittent suction as ordered  - Evaluate effectiveness of ordered antiemetic medications  - Provide nonpharmacologic comfort measures as appropriate  - Advance diet as tolerated, if ordered  - Obtain nutritional consult as needed  - Evaluate fluid balance  Outcome: Progressing    Problem: RISK FOR INFECTION - ADULT  Goal: Absence of fever/infection during anticipated neutropenic period  Description: INTERVENTIONS  - Monitor WBC  - Administer growth factors as ordered  - Implement neutropenic guidelines  Outcome: Not Progressing

## 2022-03-28 NOTE — TELEPHONE ENCOUNTER
Pt's wife stated pt is in the hospital. Pt stated pt had CT scan done and they saw a mass, they went in with a scope and determined it was inflammation.   GAVIN

## 2022-03-28 NOTE — ANESTHESIA POSTPROCEDURE EVALUATION
[address] [address] [place] [address] Patient Status:  Inpatient   Age/Gender 80year old male MRN [id number]   Location [address] [address] Attending Marco A Milner, [address] Day # 1 PCP Flavio Moore MD       Anesthesia Post-op Note    ENDOSCOPIC ULTRASOUND (EUS) , Esophagogastroduodenoscopy with biopsy    Procedure Summary     Date: 03/28/22 Room / Location: [facility] ENDOSCOPY 04 / [facility] ENDOSCOPY    Anesthesia Start: 2239 Anesthesia Stop: [place]    Procedures:       ENDOSCOPIC ULTRASOUND (EUS) , Esophagogastroduodenoscopy with biopsy (N/A )      ESOPHAGOGASTRODUODENOSCOPY (EGD) (N/A ) Diagnosis:       Pancreatic mass      (fatty liver, chronic pancreatitis, hiatal hernia, barretts)    Surgeons: Junaid Allen MD Anesthesiologist: Nicolás Islas MD    Anesthesia Type: MAC ASA Status: 2          Anesthesia Type: MAC    Vitals Value Taken Time   BP 98/60 03/28/22 0838   Temp  03/28/22 0842   Pulse 59 03/28/22 0841   Resp 16 03/28/22 0838   SpO2 95 % 03/28/22 0841   Vitals shown include unvalidated device data. Patient Location: Endoscopy    Anesthesia Type: MAC    Airway Patency: patent    Postop Pain Control: adequate    Mental Status: mildly sedated but able to meaningfully participate in the post-anesthesia evaluation    Nausea/Vomiting: none    Cardiopulmonary/Hydration status: stable euvolemic    Complications: no apparent anesthesia related complications    Postop vital signs: stable    Dental Exam: Unchanged from Preop    Patient to be discharged home when criteria met.

## 2022-03-29 ENCOUNTER — APPOINTMENT (OUTPATIENT)
Dept: NUCLEAR MEDICINE | Facility: HOSPITAL | Age: 72
DRG: 440 | End: 2022-03-29
Attending: INTERNAL MEDICINE
Payer: COMMERCIAL

## 2022-03-29 LAB
GLUCOSE BLD-MCNC: 125 MG/DL (ref 70–99)
GLUCOSE BLD-MCNC: 164 MG/DL (ref 70–99)
GLUCOSE BLD-MCNC: 208 MG/DL (ref 70–99)
GLUCOSE BLD-MCNC: 227 MG/DL (ref 70–99)

## 2022-03-29 PROCEDURE — 97530 THERAPEUTIC ACTIVITIES: CPT

## 2022-03-29 PROCEDURE — 78306 BONE IMAGING WHOLE BODY: CPT | Performed by: INTERNAL MEDICINE

## 2022-03-29 PROCEDURE — 97116 GAIT TRAINING THERAPY: CPT

## 2022-03-29 PROCEDURE — 78832 RP LOCLZJ TUM SPECT W/CT 2: CPT | Performed by: INTERNAL MEDICINE

## 2022-03-29 PROCEDURE — 82962 GLUCOSE BLOOD TEST: CPT

## 2022-03-29 NOTE — PLAN OF CARE
Pt a/o x4. VSS on RA. Afebrile. No c/o pain. Meds/abx taken per [facility] and tolerated. Wife assisted pt with a sponge bath. Wife at bedside. Fall risk protocol followed, call light within reach.      Problem: GASTROINTESTINAL - ADULT  Goal: Minimal or absence of nausea and vomiting  Description: INTERVENTIONS:  - Maintain adequate hydration with IV or PO as ordered and tolerated  - Nasogastric tube to low intermittent suction as ordered  - Evaluate effectiveness of ordered antiemetic medications  - Provide nonpharmacologic comfort measures as appropriate  - Advance diet as tolerated, if ordered  - Obtain nutritional consult as needed  - Evaluate fluid balance  Outcome: Progressing     Problem: MUSCULOSKELETAL - ADULT  Goal: Return mobility to safest level of function  Description: INTERVENTIONS:  - Assess patient stability and activity tolerance for standing, transferring and ambulating w/ or w/o assistive devices  - Assist with transfers and ambulation using safe patient handling equipment as needed  - Ensure adequate protection for wounds/incisions during mobilization  - Obtain PT/OT consults as needed  - Advance activity as appropriate  - Communicate ordered activity level and limitations with patient/family  Outcome: Progressing  Goal: Maintain proper alignment of affected body part  Description: INTERVENTIONS:  - Support and protect limb and body alignment per provider's orders  - Instruct and reinforce with patient and family use of appropriate assistive device and precautions (e.g. spinal or hip dislocation precautions)  Outcome: Progressing     Problem: RISK FOR INFECTION - ADULT  Goal: Absence of fever/infection during anticipated neutropenic period  Description: INTERVENTIONS  - Monitor WBC  - Administer growth factors as ordered  - Implement neutropenic guidelines  Outcome: Progressing     Problem: Diabetes/Glucose Control  Goal: Glucose maintained within prescribed range  Description: INTERVENTIONS:  - Monitor Blood Glucose as ordered  - Assess for signs and symptoms of hyperglycemia and hypoglycemia  - Administer ordered medications to maintain glucose within target range  - Assess barriers to adequate nutritional intake and initiate nutrition consult as needed  - Instruct patient on self management of diabetes  Outcome: Progressing

## 2022-03-29 NOTE — PLAN OF CARE
Pt received A&Ox4, forgetful at times. Afebrile. VSS. Tylenol given for generalized body aches. Denies pain. Pt's wife at the bedside. Bone scan completed this afternoon. Seen by VANESSA, okay to advance diet as tolerated per MD. Pt tolerating well. Up in halls w/ PT. LR running @ 125ml/hr. IV zosyn per MAR. Fall precautions in place. Call light in reach.

## 2022-03-30 ENCOUNTER — TELEPHONE (OUTPATIENT)
Dept: HEMATOLOGY/ONCOLOGY | Facility: HOSPITAL | Age: 72
End: 2022-03-30

## 2022-03-30 ENCOUNTER — TELEPHONE (OUTPATIENT)
Dept: ADMINISTRATIVE | Age: 72
End: 2022-03-30

## 2022-03-30 VITALS
HEART RATE: 57 BPM | BODY MASS INDEX: 22.57 KG/M2 | SYSTOLIC BLOOD PRESSURE: 126 MMHG | RESPIRATION RATE: 17 BRPM | WEIGHT: 181.5 LBS | DIASTOLIC BLOOD PRESSURE: 77 MMHG | TEMPERATURE: 98 F | HEIGHT: 75 IN | OXYGEN SATURATION: 100 %

## 2022-03-30 PROBLEM — K22.719 BARRETT'S ESOPHAGUS WITH DYSPLASIA: Status: ACTIVE | Noted: 2019-04-05

## 2022-03-30 LAB
ALBUMIN SERPL ELPH-MCNC: 2.9 G/DL (ref 3.75–5.21)
ALBUMIN/GLOB SERPL: 0.75 {RATIO} (ref 1–2)
ALPHA1 GLOB SERPL ELPH-MCNC: 0.54 G/DL (ref 0.19–0.46)
ALPHA2 GLOB SERPL ELPH-MCNC: 1.02 G/DL (ref 0.48–1.05)
B-GLOBULIN SERPL ELPH-MCNC: 0.92 G/DL (ref 0.68–1.23)
GAMMA GLOB SERPL ELPH-MCNC: 1.42 G/DL (ref 0.62–1.7)
GLUCOSE BLD-MCNC: 166 MG/DL (ref 70–99)
GLUCOSE BLD-MCNC: 228 MG/DL (ref 70–99)
KAPPA LC FREE SER-MCNC: 4.53 MG/DL (ref 0.33–1.94)
KAPPA LC FREE/LAMBDA FREE SER NEPH: 1.59 {RATIO} (ref 0.26–1.65)
LAMBDA LC FREE SERPL-MCNC: 2.85 MG/DL (ref 0.57–2.63)
PROT SERPL-MCNC: 6.8 G/DL (ref 6.4–8.2)

## 2022-03-30 PROCEDURE — 82962 GLUCOSE BLOOD TEST: CPT

## 2022-03-30 RX ORDER — TAMSULOSIN HYDROCHLORIDE 0.4 MG/1
0.4 CAPSULE ORAL DAILY
Qty: 30 CAPSULE | Refills: 0 | Status: SHIPPED | OUTPATIENT
Start: 2022-03-30 | End: 2022-04-05

## 2022-03-30 RX ORDER — LEVOFLOXACIN 500 MG/1
500 TABLET, FILM COATED ORAL DAILY
Qty: 7 TABLET | Refills: 0 | Status: SHIPPED | OUTPATIENT
Start: 2022-03-30 | End: 2022-04-06

## 2022-03-30 NOTE — TELEPHONE ENCOUNTER
Patient requested a new referral to see Oncology Jose,Please review and sign  Plan and care if you agree Thank you. Radha Caputo V      [facility] REFERRALS.

## 2022-03-30 NOTE — PLAN OF CARE
Patient alert and oriented, lungs clear on room air, abdomen soft, bowel sounds present, passing flatus, voiding adequate amounts, denies pain, IVF infusing, medications per MAR, VSS, afebrile.

## 2022-03-30 NOTE — TELEPHONE ENCOUNTER
----- Message from [facility]. Boby Gibson sent at 3/30/2022  1:13 PM CDT -----  Regarding: Hospital Discharge Follow up  Pt has been discharged. Can you please call him to set up follow up appointment with Dr. Jennifer Redding for 4 weeks from now? Thank you so much!     Dayanna Lagos

## 2022-03-31 ENCOUNTER — TELEPHONE (OUTPATIENT)
Dept: INTERNAL MEDICINE CLINIC | Facility: CLINIC | Age: 72
End: 2022-03-31

## 2022-03-31 ENCOUNTER — PATIENT OUTREACH (OUTPATIENT)
Dept: CASE MANAGEMENT | Age: 72
End: 2022-03-31

## 2022-03-31 NOTE — TELEPHONE ENCOUNTER
JL, would you advise pt to take Vasopro Plus, Nitric oxide, and Neurosoothe? Pt's chiropractor recommended pt start taking them.

## 2022-03-31 NOTE — TELEPHONE ENCOUNTER
Spoke to pt's spouse for TCM today. Pt does not have HFU appt scheduled at this time. Spouse states that they prefer to follow-up all with all his specialist first.  TCM/HFU appt recommended by 4/6/22 as pt is a high risk for readmission. Please advise. BOOK BY DATE (last date for TCM): 4/13/22    TRIAGE:  Please f/u with pt and try to get them to schedule as pt would greatly benefit from TCM/HFU appt. Thank you!

## 2022-03-31 NOTE — TELEPHONE ENCOUNTER
I called pt to sched hosp fup and subhash w/spouse Gogo-rachell appt for 4/13-she then stated pt went to chiropractor today and they want him to start taking a few supplements and not sure if he can since they have grapefruit in them?:  Vasopro Plus  Nitric oxide  Neurosoothe

## 2022-03-31 NOTE — TELEPHONE ENCOUNTER
Future Appointments   Date Time Provider Linda Maddox   4/9/2022  8:15 AM [address] [facility]   4/12/2022  4:00 PM Marvin Singh MD [address] [address] ECC SUB GI   4/13/2022  3:20 PM Andres Gil MD [facility] [facility]   4/28/2022 10:30 AM ONEAL Colin [facility] E[facility] SUB GI   5/5/2022 11:15 AM Margret Davis MD [facility] EC [address]

## 2022-04-01 NOTE — TELEPHONE ENCOUNTER
Patient notified per JL he would recommend not taking the medications advised by Chiropractor. Pt verbalizes understanding and agreeable, will not be taking.

## 2022-04-04 RX ORDER — METFORMIN HYDROCHLORIDE 500 MG/1
TABLET, EXTENDED RELEASE ORAL
Qty: 360 TABLET | Refills: 0 | Status: SHIPPED | OUTPATIENT
Start: 2022-04-04

## 2022-04-05 ENCOUNTER — OFFICE VISIT (OUTPATIENT)
Dept: INTERNAL MEDICINE CLINIC | Facility: CLINIC | Age: 72
End: 2022-04-05
Payer: COMMERCIAL

## 2022-04-05 ENCOUNTER — TELEPHONE (OUTPATIENT)
Dept: HEMATOLOGY/ONCOLOGY | Facility: HOSPITAL | Age: 72
End: 2022-04-05

## 2022-04-05 VITALS
HEART RATE: 66 BPM | DIASTOLIC BLOOD PRESSURE: 66 MMHG | RESPIRATION RATE: 18 BRPM | SYSTOLIC BLOOD PRESSURE: 108 MMHG | BODY MASS INDEX: 22.11 KG/M2 | HEIGHT: 75 IN | WEIGHT: 177.81 LBS

## 2022-04-05 DIAGNOSIS — K86.3 PSEUDOCYST OF PANCREAS: ICD-10-CM

## 2022-04-05 DIAGNOSIS — E53.8 B12 DEFICIENCY: Primary | ICD-10-CM

## 2022-04-05 DIAGNOSIS — I10 ESSENTIAL HYPERTENSION WITH GOAL BLOOD PRESSURE LESS THAN 130/80: ICD-10-CM

## 2022-04-05 DIAGNOSIS — R41.3 POOR SHORT TERM MEMORY: ICD-10-CM

## 2022-04-05 DIAGNOSIS — K22.719 BARRETT'S ESOPHAGUS WITH DYSPLASIA: ICD-10-CM

## 2022-04-05 DIAGNOSIS — M89.9 LESION OF LUMBAR SPINE: ICD-10-CM

## 2022-04-05 DIAGNOSIS — R39.15 URINARY URGENCY: ICD-10-CM

## 2022-04-05 DIAGNOSIS — E11.40 TYPE 2 DIABETES MELLITUS WITH DIABETIC NEUROPATHY, WITHOUT LONG-TERM CURRENT USE OF INSULIN (HCC): ICD-10-CM

## 2022-04-05 DIAGNOSIS — K85.90 ACUTE PANCREATITIS, UNSPECIFIED COMPLICATION STATUS, UNSPECIFIED PANCREATITIS TYPE: ICD-10-CM

## 2022-04-05 PROCEDURE — 3078F DIAST BP <80 MM HG: CPT | Performed by: FAMILY MEDICINE

## 2022-04-05 PROCEDURE — 3008F BODY MASS INDEX DOCD: CPT | Performed by: FAMILY MEDICINE

## 2022-04-05 PROCEDURE — 99496 TRANSJ CARE MGMT HIGH F2F 7D: CPT | Performed by: FAMILY MEDICINE

## 2022-04-05 PROCEDURE — 96372 THER/PROPH/DIAG INJ SC/IM: CPT | Performed by: FAMILY MEDICINE

## 2022-04-05 PROCEDURE — 3074F SYST BP LT 130 MM HG: CPT | Performed by: FAMILY MEDICINE

## 2022-04-05 PROCEDURE — 1111F DSCHRG MED/CURRENT MED MERGE: CPT | Performed by: FAMILY MEDICINE

## 2022-04-05 RX ORDER — THIAMINE HCL 100 MG
TABLET ORAL
COMMUNITY

## 2022-04-05 RX ORDER — CYANOCOBALAMIN 1000 UG/ML
1000 INJECTION INTRAMUSCULAR; SUBCUTANEOUS ONCE
Status: COMPLETED | OUTPATIENT
Start: 2022-04-05 | End: 2022-04-05

## 2022-04-05 RX ORDER — TAMSULOSIN HYDROCHLORIDE 0.4 MG/1
0.4 CAPSULE ORAL DAILY
Qty: 30 CAPSULE | Refills: 0 | Status: SHIPPED | OUTPATIENT
Start: 2022-04-05 | End: 2022-05-05

## 2022-04-05 RX ADMIN — CYANOCOBALAMIN 1000 MCG: 1000 INJECTION INTRAMUSCULAR; SUBCUTANEOUS at 12:06:00

## 2022-04-05 NOTE — TELEPHONE ENCOUNTER
Patient had a biopsy last week at [facility] and patient's daughter in asking when they will receive the results

## 2022-04-05 NOTE — TELEPHONE ENCOUNTER
Spoke with patient's daughter. Per Dr. Alyssa Cotto, patient should contact GI for biopsy results. Patient and daughter verbalized understanding and will folllow-up with [facility].

## 2022-04-05 NOTE — PROGRESS NOTES
Date: 2022    To: Daphne Jones  : 1950     I hope this letter finds you doing well. 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 The cells lose their normal make-up and become altered. Consequently, we will have to keep a careful watch on your esophagus in the years to come. I am advising you to undergo repeat upper endoscopy in 3 years. We will send you a reminder card when the time of your procedure is near. Please call the office at [phone] if there are any questions.     Anastasiya Haynes M.D.

## 2022-04-09 ENCOUNTER — LAB ENCOUNTER (OUTPATIENT)
Dept: LAB | Facility: HOSPITAL | Age: 72
End: 2022-04-09
Attending: STUDENT IN AN ORGANIZED HEALTH CARE EDUCATION/TRAINING PROGRAM
Payer: COMMERCIAL

## 2022-04-09 DIAGNOSIS — Z01.818 PRE-OP TESTING: ICD-10-CM

## 2022-04-10 LAB — SARS-COV-2 RNA RESP QL NAA+PROBE: NOT DETECTED

## 2022-04-12 PROBLEM — K22.70 BARRETT'S ESOPHAGUS: Status: ACTIVE | Noted: 2022-04-12

## 2022-04-12 PROBLEM — K44.9 HIATAL HERNIA: Status: ACTIVE | Noted: 2022-04-12

## 2022-04-12 PROBLEM — K22.70 BARRETT'S ESOPHAGUS WITHOUT DYSPLASIA: Status: ACTIVE | Noted: 2022-04-12

## 2022-04-12 PROCEDURE — 88305 TISSUE EXAM BY PATHOLOGIST: CPT | Performed by: STUDENT IN AN ORGANIZED HEALTH CARE EDUCATION/TRAINING PROGRAM

## 2022-04-22 ENCOUNTER — HOSPITAL ENCOUNTER (OUTPATIENT)
Dept: CT IMAGING | Facility: HOSPITAL | Age: 72
Discharge: HOME | End: 2022-04-22
Attending: STUDENT IN AN ORGANIZED HEALTH CARE EDUCATION/TRAINING PROGRAM
Payer: COMMERCIAL

## 2022-04-22 DIAGNOSIS — K86.89 PANCREATIC MASS: ICD-10-CM

## 2022-04-22 PROCEDURE — 74170 CT ABD WO CNTRST FLWD CNTRST: CPT | Performed by: STUDENT IN AN ORGANIZED HEALTH CARE EDUCATION/TRAINING PROGRAM

## 2022-04-22 RX ORDER — IOHEXOL 350 MG/ML
100 INJECTION, SOLUTION INTRAVENOUS
Status: COMPLETED | OUTPATIENT
Start: 2022-04-22 | End: 2022-04-22

## 2022-04-22 RX ADMIN — IOHEXOL 100 ML: 350 INJECTION, SOLUTION INTRAVENOUS at 08:20:00

## 2022-04-22 NOTE — PROGRESS NOTES
Dianna Moody,    Good news with the repeat CT scan results. The mass appears to have decreased in size, suggesting its likely from inflammation, rather than a cancer or tumor. The evaluation you had in the hospital also supports an inflammatory process, not a cancer. Let's repeat the CT scan again in 4-6 weeks.   Please call with any questions,    lAexander Estrada MD

## 2022-04-27 ENCOUNTER — APPOINTMENT (OUTPATIENT)
Dept: HEMATOLOGY/ONCOLOGY | Facility: HOSPITAL | Age: 72
End: 2022-04-27
Attending: INTERNAL MEDICINE
Payer: COMMERCIAL

## 2022-04-28 ENCOUNTER — IMMUNIZATION (OUTPATIENT)
Dept: LAB | Age: 72
End: 2022-04-28
Attending: EMERGENCY MEDICINE
Payer: COMMERCIAL

## 2022-04-28 DIAGNOSIS — Z23 NEED FOR VACCINATION: Primary | ICD-10-CM

## 2022-04-28 PROCEDURE — 0054A SARSCOV2 VAC 30MCG TRS SUCR: CPT

## 2022-05-05 ENCOUNTER — TELEPHONE (OUTPATIENT)
Dept: INTERNAL MEDICINE CLINIC | Facility: CLINIC | Age: 72
End: 2022-05-05

## 2022-05-05 ENCOUNTER — OFFICE VISIT (OUTPATIENT)
Dept: SURGERY | Facility: CLINIC | Age: 72
End: 2022-05-05
Payer: COMMERCIAL

## 2022-05-05 ENCOUNTER — OFFICE VISIT (OUTPATIENT)
Dept: INTERNAL MEDICINE CLINIC | Facility: CLINIC | Age: 72
End: 2022-05-05
Payer: COMMERCIAL

## 2022-05-05 VITALS
WEIGHT: 183 LBS | HEIGHT: 75 IN | HEART RATE: 62 BPM | OXYGEN SATURATION: 98 % | SYSTOLIC BLOOD PRESSURE: 106 MMHG | DIASTOLIC BLOOD PRESSURE: 68 MMHG | BODY MASS INDEX: 22.75 KG/M2

## 2022-05-05 DIAGNOSIS — R39.15 URINARY URGENCY: ICD-10-CM

## 2022-05-05 DIAGNOSIS — R39.9 LOWER URINARY TRACT SYMPTOMS: ICD-10-CM

## 2022-05-05 DIAGNOSIS — I10 ESSENTIAL HYPERTENSION WITH GOAL BLOOD PRESSURE LESS THAN 130/80: ICD-10-CM

## 2022-05-05 DIAGNOSIS — R82.90 URINE FINDING: Primary | ICD-10-CM

## 2022-05-05 DIAGNOSIS — E11.9 TYPE 2 DIABETES MELLITUS WITHOUT COMPLICATION, WITHOUT LONG-TERM CURRENT USE OF INSULIN (HCC): ICD-10-CM

## 2022-05-05 DIAGNOSIS — E11.40 TYPE 2 DIABETES MELLITUS WITH DIABETIC NEUROPATHY, WITHOUT LONG-TERM CURRENT USE OF INSULIN (HCC): Primary | ICD-10-CM

## 2022-05-05 DIAGNOSIS — Z00.00 ROUTINE GENERAL MEDICAL EXAMINATION AT A HEALTH CARE FACILITY: Primary | ICD-10-CM

## 2022-05-05 DIAGNOSIS — E53.8 B12 DEFICIENCY: ICD-10-CM

## 2022-05-05 DIAGNOSIS — E78.00 PURE HYPERCHOLESTEROLEMIA: ICD-10-CM

## 2022-05-05 DIAGNOSIS — N40.0 ENLARGED PROSTATE: ICD-10-CM

## 2022-05-05 DIAGNOSIS — K85.90 ACUTE PANCREATITIS, UNSPECIFIED COMPLICATION STATUS, UNSPECIFIED PANCREATITIS TYPE: ICD-10-CM

## 2022-05-05 DIAGNOSIS — K22.70 BARRETT'S ESOPHAGUS WITHOUT DYSPLASIA: ICD-10-CM

## 2022-05-05 DIAGNOSIS — K86.3 PSEUDOCYST OF PANCREAS: ICD-10-CM

## 2022-05-05 LAB
APPEARANCE: CLEAR
BILIRUBIN: NEGATIVE
CREAT UR-SCNC: 68.3 MG/DL
GLUCOSE (URINE DIPSTICK): >=1000 MG/DL
KETONES (URINE DIPSTICK): NEGATIVE MG/DL
LEUKOCYTES: NEGATIVE
MICROALBUMIN UR-MCNC: 0.82 MG/DL
MICROALBUMIN/CREAT 24H UR-RTO: 12 UG/MG (ref ?–30)
MULTISTIX LOT#: ABNORMAL NUMERIC
NITRITE, URINE: NEGATIVE
OCCULT BLOOD: NEGATIVE
PH, URINE: 5 (ref 4.5–8)
PROTEIN (URINE DIPSTICK): NEGATIVE MG/DL
SPECIFIC GRAVITY: <=1.005 (ref 1–1.03)
URINE-COLOR: YELLOW
UROBILINOGEN,SEMI-QN: 0.2 MG/DL (ref 0–1.9)

## 2022-05-05 PROCEDURE — 3074F SYST BP LT 130 MM HG: CPT | Performed by: FAMILY MEDICINE

## 2022-05-05 PROCEDURE — 82570 ASSAY OF URINE CREATININE: CPT | Performed by: FAMILY MEDICINE

## 2022-05-05 PROCEDURE — 3008F BODY MASS INDEX DOCD: CPT | Performed by: FAMILY MEDICINE

## 2022-05-05 PROCEDURE — 3061F NEG MICROALBUMINURIA REV: CPT | Performed by: FAMILY MEDICINE

## 2022-05-05 PROCEDURE — 3078F DIAST BP <80 MM HG: CPT | Performed by: FAMILY MEDICINE

## 2022-05-05 PROCEDURE — 82043 UR ALBUMIN QUANTITATIVE: CPT | Performed by: FAMILY MEDICINE

## 2022-05-05 PROCEDURE — 99214 OFFICE O/P EST MOD 30 MIN: CPT | Performed by: FAMILY MEDICINE

## 2022-05-05 PROCEDURE — 81003 URINALYSIS AUTO W/O SCOPE: CPT | Performed by: UROLOGY

## 2022-05-05 PROCEDURE — 99213 OFFICE O/P EST LOW 20 MIN: CPT | Performed by: UROLOGY

## 2022-05-05 NOTE — TELEPHONE ENCOUNTER
Orders to Barbara Howe  aware must fast no call back required  Future Appointments   Date Time Provider Linda Maddox   6/2/2022 12:00 PM [address] [facility] MAIN RM[address]   6/9/2022  1:00 PM Alfonso Quiñonez MD [facility] [facility]

## 2022-05-06 ENCOUNTER — TELEPHONE (OUTPATIENT)
Dept: SURGERY | Facility: CLINIC | Age: 72
End: 2022-05-06

## 2022-05-06 NOTE — TELEPHONE ENCOUNTER
RN called wife, Cami Aranda [phone]   in response to her call. No answer. Office of Dr Raisa English did not call. Referred to call Dr Adriel Orellana' office.

## 2022-05-06 NOTE — TELEPHONE ENCOUNTER
Per pt's spouse returning call regarding test results, states it could be from medication pt is currently on.  Please advise

## 2022-05-09 NOTE — TELEPHONE ENCOUNTER
Labs placed for [facility] lab, per protocol.   Pending to [address]  Microalb done 5.5.22

## 2022-05-23 RX ORDER — GLIPIZIDE 5 MG/1
TABLET, FILM COATED, EXTENDED RELEASE ORAL
Qty: 90 TABLET | Refills: 0 | OUTPATIENT
Start: 2022-05-23

## 2022-05-23 RX ORDER — EMPAGLIFLOZIN 10 MG/1
TABLET, FILM COATED ORAL
Qty: 90 TABLET | Refills: 0 | Status: SHIPPED | OUTPATIENT
Start: 2022-05-23

## 2022-05-23 RX ORDER — ATENOLOL 50 MG/1
TABLET ORAL
Qty: 90 TABLET | Refills: 0 | Status: SHIPPED | OUTPATIENT
Start: 2022-05-23

## 2022-05-23 RX ORDER — AMLODIPINE BESYLATE 2.5 MG/1
TABLET ORAL
Qty: 90 TABLET | Refills: 0 | Status: SHIPPED | OUTPATIENT
Start: 2022-05-23

## 2022-05-23 NOTE — TELEPHONE ENCOUNTER
PASSED per protocol, refill sent. Last PE: 11--Physical JL    Per last note:  GLIPIZIDE ER 5 MG Oral Tablet 24 Hr     The original prescription was discontinued on 4/11/2022 by Eligio Zacarias for the following reason: Dose adjustment. Renewing this prescription may not be appropriate.          Future Appointments   Date Time Provider Linda Maddox   6/2/2022 12:00 PM [address] [facility] MAIN RM3 [address]   6/9/2022  1:00 PM Ar Doan MD [facility] [address] [facility]      Patient taking 10 mg Glipizide

## 2022-06-02 ENCOUNTER — HOSPITAL ENCOUNTER (OUTPATIENT)
Dept: CT IMAGING | Facility: HOSPITAL | Age: 72
Discharge: HOME | End: 2022-06-02
Attending: STUDENT IN AN ORGANIZED HEALTH CARE EDUCATION/TRAINING PROGRAM
Payer: COMMERCIAL

## 2022-06-02 DIAGNOSIS — K86.89 PANCREATIC MASS: ICD-10-CM

## 2022-06-02 LAB — CREAT BLD-MCNC: 0.9 MG/DL

## 2022-06-02 PROCEDURE — 82565 ASSAY OF CREATININE: CPT

## 2022-06-02 PROCEDURE — 74177 CT ABD & PELVIS W/CONTRAST: CPT | Performed by: STUDENT IN AN ORGANIZED HEALTH CARE EDUCATION/TRAINING PROGRAM

## 2022-06-07 RX ORDER — GLIPIZIDE 5 MG/1
TABLET, FILM COATED, EXTENDED RELEASE ORAL
Qty: 90 TABLET | Refills: 0 | OUTPATIENT
Start: 2022-06-07

## 2022-06-09 ENCOUNTER — OFFICE VISIT (OUTPATIENT)
Dept: INTERNAL MEDICINE CLINIC | Facility: CLINIC | Age: 72
End: 2022-06-09
Payer: COMMERCIAL

## 2022-06-09 VITALS
DIASTOLIC BLOOD PRESSURE: 56 MMHG | HEIGHT: 75 IN | TEMPERATURE: 97 F | WEIGHT: 184 LBS | BODY MASS INDEX: 22.88 KG/M2 | HEART RATE: 57 BPM | SYSTOLIC BLOOD PRESSURE: 118 MMHG

## 2022-06-09 DIAGNOSIS — Z00.00 WELLNESS EXAMINATION: Primary | ICD-10-CM

## 2022-06-09 DIAGNOSIS — K85.90 ACUTE PANCREATITIS, UNSPECIFIED COMPLICATION STATUS, UNSPECIFIED PANCREATITIS TYPE: ICD-10-CM

## 2022-06-09 DIAGNOSIS — K86.3 PSEUDOCYST OF PANCREAS: ICD-10-CM

## 2022-06-09 DIAGNOSIS — I10 ESSENTIAL HYPERTENSION WITH GOAL BLOOD PRESSURE LESS THAN 130/80: ICD-10-CM

## 2022-06-09 DIAGNOSIS — I25.10 CORONARY ARTERY DISEASE INVOLVING NATIVE HEART WITHOUT ANGINA PECTORIS, UNSPECIFIED VESSEL OR LESION TYPE: ICD-10-CM

## 2022-06-09 DIAGNOSIS — K22.70 BARRETT'S ESOPHAGUS WITHOUT DYSPLASIA: ICD-10-CM

## 2022-06-09 DIAGNOSIS — E11.9 TYPE 2 DIABETES MELLITUS WITHOUT COMPLICATION, WITHOUT LONG-TERM CURRENT USE OF INSULIN (HCC): ICD-10-CM

## 2022-06-09 DIAGNOSIS — E78.00 PURE HYPERCHOLESTEROLEMIA: ICD-10-CM

## 2022-06-09 DIAGNOSIS — M89.9 LESION OF LUMBOSACRAL VERTEBRA: ICD-10-CM

## 2022-06-09 LAB
CARTRIDGE LOT#: 889 NUMERIC
HEMOGLOBIN A1C: 7.1 % (ref 4.3–5.6)

## 2022-06-09 PROCEDURE — 3008F BODY MASS INDEX DOCD: CPT | Performed by: FAMILY MEDICINE

## 2022-06-09 PROCEDURE — 3074F SYST BP LT 130 MM HG: CPT | Performed by: FAMILY MEDICINE

## 2022-06-09 PROCEDURE — 3078F DIAST BP <80 MM HG: CPT | Performed by: FAMILY MEDICINE

## 2022-06-09 PROCEDURE — 99397 PER PM REEVAL EST PAT 65+ YR: CPT | Performed by: FAMILY MEDICINE

## 2022-06-09 RX ORDER — GLIPIZIDE 10 MG/1
10 TABLET ORAL DAILY
Qty: 90 TABLET | Refills: 1 | Status: SHIPPED | OUTPATIENT
Start: 2022-06-09

## 2022-06-09 RX ORDER — EMPAGLIFLOZIN 25 MG/1
1 TABLET, FILM COATED ORAL DAILY
Qty: 90 TABLET | Refills: 1 | Status: SHIPPED | OUTPATIENT
Start: 2022-06-09

## 2022-06-12 DIAGNOSIS — R39.15 URINARY URGENCY: ICD-10-CM

## 2022-06-14 RX ORDER — TAMSULOSIN HYDROCHLORIDE 0.4 MG/1
CAPSULE ORAL
Qty: 30 CAPSULE | Refills: 0 | Status: SHIPPED | OUTPATIENT
Start: 2022-06-14

## 2022-06-14 RX ORDER — ATORVASTATIN CALCIUM 20 MG/1
TABLET, FILM COATED ORAL
Qty: 90 TABLET | Refills: 0 | Status: SHIPPED | OUTPATIENT
Start: 2022-06-14

## 2022-06-14 NOTE — TELEPHONE ENCOUNTER
PASSED per protocol    Last PE--6-9-2022-        Future Appointments   Date Time Provider Linda Varsha   9/8/2022 11:20 AM Bennie Duran MD [facility] [address] [facility]

## 2022-07-06 RX ORDER — GLIPIZIDE 10 MG/1
10 TABLET, FILM COATED, EXTENDED RELEASE ORAL DAILY
Qty: 90 TABLET | Refills: 0 | Status: SHIPPED | OUTPATIENT
Start: 2022-07-06

## 2022-07-06 RX ORDER — METFORMIN HYDROCHLORIDE 500 MG/1
TABLET, EXTENDED RELEASE ORAL
Qty: 360 TABLET | Refills: 0 | Status: SHIPPED | OUTPATIENT
Start: 2022-07-06

## 2022-07-18 DIAGNOSIS — R39.15 URINARY URGENCY: ICD-10-CM

## 2022-07-19 RX ORDER — TAMSULOSIN HYDROCHLORIDE 0.4 MG/1
CAPSULE ORAL
Qty: 30 CAPSULE | Refills: 0 | Status: SHIPPED | OUTPATIENT
Start: 2022-07-19

## 2022-07-21 ENCOUNTER — TELEPHONE (OUTPATIENT)
Dept: INTERNAL MEDICINE CLINIC | Facility: CLINIC | Age: 72
End: 2022-07-21

## 2022-07-21 DIAGNOSIS — R41.3 MEMORY CHANGES: Primary | ICD-10-CM

## 2022-07-21 NOTE — TELEPHONE ENCOUNTER
Referral needed; Patient is seeing Neuropsychology, Schuyler Young [phone], [facility] [address] (for Cognitive Eval)  Bouts of Memory Issues.   Appt is 8/11/22

## 2022-07-21 NOTE — TELEPHONE ENCOUNTER
Please reach out to get name of provider pt is seeing/ensure pt knows they are in their plan. Need this to place on referral. Thanks!

## 2022-08-12 RX ORDER — SILDENAFIL 50 MG/1
TABLET, FILM COATED ORAL
COMMUNITY
Start: 2022-07-28

## 2022-08-24 ENCOUNTER — TELEPHONE (OUTPATIENT)
Dept: INTERNAL MEDICINE CLINIC | Facility: CLINIC | Age: 72
End: 2022-08-24

## 2022-09-01 ENCOUNTER — HOSPITAL ENCOUNTER (OUTPATIENT)
Dept: CT IMAGING | Facility: HOSPITAL | Age: 72
Discharge: HOME | End: 2022-09-01
Attending: STUDENT IN AN ORGANIZED HEALTH CARE EDUCATION/TRAINING PROGRAM
Payer: COMMERCIAL

## 2022-09-01 DIAGNOSIS — K86.89 PANCREATIC MASS: ICD-10-CM

## 2022-09-01 LAB
CREAT BLD-MCNC: 1 MG/DL
GFR SERPLBLD BASED ON 1.73 SQ M-ARVRAT: 80 ML/MIN/1.73M2 (ref 60–?)

## 2022-09-01 PROCEDURE — 82565 ASSAY OF CREATININE: CPT

## 2022-09-01 PROCEDURE — 74160 CT ABDOMEN W/CONTRAST: CPT | Performed by: STUDENT IN AN ORGANIZED HEALTH CARE EDUCATION/TRAINING PROGRAM

## 2022-09-01 RX ORDER — IOHEXOL 350 MG/ML
100 INJECTION, SOLUTION INTRAVENOUS
Status: COMPLETED | OUTPATIENT
Start: 2022-09-01 | End: 2022-09-01

## 2022-09-01 RX ADMIN — IOHEXOL 100 ML: 350 INJECTION, SOLUTION INTRAVENOUS at 13:05:00

## 2022-09-02 DIAGNOSIS — R39.15 URINARY URGENCY: ICD-10-CM

## 2022-09-02 RX ORDER — TAMSULOSIN HYDROCHLORIDE 0.4 MG/1
CAPSULE ORAL
Qty: 30 CAPSULE | Refills: 0 | Status: SHIPPED | OUTPATIENT
Start: 2022-09-02

## 2022-09-02 RX ORDER — GLIPIZIDE 10 MG/1
TABLET, FILM COATED, EXTENDED RELEASE ORAL
Qty: 90 TABLET | Refills: 0 | Status: SHIPPED | OUTPATIENT
Start: 2022-09-02

## 2022-09-06 ENCOUNTER — TELEPHONE (OUTPATIENT)
Dept: INTERNAL MEDICINE CLINIC | Facility: CLINIC | Age: 72
End: 2022-09-06

## 2022-09-06 RX ORDER — GLIPIZIDE 10 MG/1
10 TABLET, FILM COATED, EXTENDED RELEASE ORAL DAILY
Qty: 90 TABLET | Refills: 0 | Status: SHIPPED | OUTPATIENT
Start: 2022-09-06

## 2022-09-06 NOTE — TELEPHONE ENCOUNTER
FWD to [facility] last OV doesn't states medication increase to 2x daily.  Please advise

## 2022-09-07 ENCOUNTER — LAB ENCOUNTER (OUTPATIENT)
Dept: LAB | Facility: HOSPITAL | Age: 72
End: 2022-09-07
Attending: FAMILY MEDICINE
Payer: COMMERCIAL

## 2022-09-07 DIAGNOSIS — E78.00 PURE HYPERCHOLESTEROLEMIA: ICD-10-CM

## 2022-09-07 DIAGNOSIS — I10 ESSENTIAL HYPERTENSION WITH GOAL BLOOD PRESSURE LESS THAN 130/80: ICD-10-CM

## 2022-09-07 DIAGNOSIS — Z00.00 ROUTINE GENERAL MEDICAL EXAMINATION AT A HEALTH CARE FACILITY: ICD-10-CM

## 2022-09-07 DIAGNOSIS — E53.8 B12 DEFICIENCY: ICD-10-CM

## 2022-09-07 DIAGNOSIS — E11.9 TYPE 2 DIABETES MELLITUS WITHOUT COMPLICATION, WITHOUT LONG-TERM CURRENT USE OF INSULIN (HCC): ICD-10-CM

## 2022-09-07 LAB
ALBUMIN SERPL-MCNC: 3.6 G/DL (ref 3.4–5)
ALBUMIN/GLOB SERPL: 1 {RATIO} (ref 1–2)
ALP LIVER SERPL-CCNC: 81 U/L
ALT SERPL-CCNC: 31 U/L
ANION GAP SERPL CALC-SCNC: 3 MMOL/L (ref 0–18)
AST SERPL-CCNC: 10 U/L (ref 15–37)
BASOPHILS # BLD AUTO: 0.02 X10(3) UL (ref 0–0.2)
BASOPHILS NFR BLD AUTO: 0.3 %
BILIRUB SERPL-MCNC: 1.1 MG/DL (ref 0.1–2)
BUN BLD-MCNC: 13 MG/DL (ref 7–18)
CALCIUM BLD-MCNC: 9.3 MG/DL (ref 8.5–10.1)
CHLORIDE SERPL-SCNC: 108 MMOL/L (ref 98–112)
CHOLEST SERPL-MCNC: 116 MG/DL (ref ?–200)
CO2 SERPL-SCNC: 28 MMOL/L (ref 21–32)
CREAT BLD-MCNC: 1 MG/DL
EOSINOPHIL # BLD AUTO: 0.17 X10(3) UL (ref 0–0.7)
EOSINOPHIL NFR BLD AUTO: 2.4 %
ERYTHROCYTE [DISTWIDTH] IN BLOOD BY AUTOMATED COUNT: 13.4 %
EST. AVERAGE GLUCOSE BLD GHB EST-MCNC: 154 MG/DL (ref 68–126)
FASTING PATIENT LIPID ANSWER: YES
FASTING STATUS PATIENT QL REPORTED: YES
GFR SERPLBLD BASED ON 1.73 SQ M-ARVRAT: 80 ML/MIN/1.73M2 (ref 60–?)
GLOBULIN PLAS-MCNC: 3.5 G/DL (ref 2.8–4.4)
GLUCOSE BLD-MCNC: 146 MG/DL (ref 70–99)
HBA1C MFR BLD: 7 % (ref ?–5.7)
HCT VFR BLD AUTO: 44.7 %
HDLC SERPL-MCNC: 46 MG/DL (ref 40–59)
HGB BLD-MCNC: 14.2 G/DL
IMM GRANULOCYTES # BLD AUTO: 0.03 X10(3) UL (ref 0–1)
IMM GRANULOCYTES NFR BLD: 0.4 %
LDLC SERPL CALC-MCNC: 52 MG/DL (ref ?–100)
LYMPHOCYTES # BLD AUTO: 2.04 X10(3) UL (ref 1–4)
LYMPHOCYTES NFR BLD AUTO: 29.1 %
MCH RBC QN AUTO: 29.6 PG (ref 26–34)
MCHC RBC AUTO-ENTMCNC: 31.8 G/DL (ref 31–37)
MCV RBC AUTO: 93.3 FL
MONOCYTES # BLD AUTO: 0.8 X10(3) UL (ref 0.1–1)
MONOCYTES NFR BLD AUTO: 11.4 %
NEUTROPHILS # BLD AUTO: 3.95 X10 (3) UL (ref 1.5–7.7)
NEUTROPHILS # BLD AUTO: 3.95 X10(3) UL (ref 1.5–7.7)
NEUTROPHILS NFR BLD AUTO: 56.4 %
NONHDLC SERPL-MCNC: 70 MG/DL (ref ?–130)
OSMOLALITY SERPL CALC.SUM OF ELEC: 291 MOSM/KG (ref 275–295)
PLATELET # BLD AUTO: 174 10(3)UL (ref 150–450)
POTASSIUM SERPL-SCNC: 5.1 MMOL/L (ref 3.5–5.1)
PROT SERPL-MCNC: 7.1 G/DL (ref 6.4–8.2)
RBC # BLD AUTO: 4.79 X10(6)UL
SODIUM SERPL-SCNC: 139 MMOL/L (ref 136–145)
TRIGL SERPL-MCNC: 97 MG/DL (ref 30–149)
VIT B12 SERPL-MCNC: 1375 PG/ML (ref 193–986)
VLDLC SERPL CALC-MCNC: 14 MG/DL (ref 0–30)
WBC # BLD AUTO: 7 X10(3) UL (ref 4–11)

## 2022-09-07 PROCEDURE — 3051F HG A1C>EQUAL 7.0%<8.0%: CPT | Performed by: FAMILY MEDICINE

## 2022-09-07 PROCEDURE — 80061 LIPID PANEL: CPT

## 2022-09-07 PROCEDURE — 83036 HEMOGLOBIN GLYCOSYLATED A1C: CPT

## 2022-09-07 PROCEDURE — 85025 COMPLETE CBC W/AUTO DIFF WBC: CPT

## 2022-09-07 PROCEDURE — 36415 COLL VENOUS BLD VENIPUNCTURE: CPT

## 2022-09-07 PROCEDURE — 80053 COMPREHEN METABOLIC PANEL: CPT

## 2022-09-07 PROCEDURE — 82607 VITAMIN B-12: CPT

## 2022-09-07 NOTE — PROGRESS NOTES
B12 is a little high. Please confirm with pt that he is taking B12 2500 mcg SL daily as is listed in his med list.  If this is correct then OK to decrease to 1500 mcg daily. Message back to me if he is taking a different amount.

## 2022-09-08 ENCOUNTER — OFFICE VISIT (OUTPATIENT)
Dept: INTERNAL MEDICINE CLINIC | Facility: CLINIC | Age: 72
End: 2022-09-08
Payer: COMMERCIAL

## 2022-09-08 VITALS
DIASTOLIC BLOOD PRESSURE: 64 MMHG | WEIGHT: 181 LBS | BODY MASS INDEX: 22.5 KG/M2 | SYSTOLIC BLOOD PRESSURE: 100 MMHG | RESPIRATION RATE: 18 BRPM | HEIGHT: 75 IN

## 2022-09-08 DIAGNOSIS — I10 ESSENTIAL HYPERTENSION WITH GOAL BLOOD PRESSURE LESS THAN 130/80: ICD-10-CM

## 2022-09-08 DIAGNOSIS — E11.40 TYPE 2 DIABETES MELLITUS WITH DIABETIC NEUROPATHY, WITHOUT LONG-TERM CURRENT USE OF INSULIN (HCC): Primary | ICD-10-CM

## 2022-09-08 DIAGNOSIS — E53.8 B12 DEFICIENCY: ICD-10-CM

## 2022-09-08 PROBLEM — E87.6 HYPOKALEMIA: Status: RESOLVED | Noted: 2022-01-21 | Resolved: 2022-09-08

## 2022-09-08 PROBLEM — R55 SYNCOPE AND COLLAPSE: Status: RESOLVED | Noted: 2022-01-21 | Resolved: 2022-09-08

## 2022-09-08 PROBLEM — K22.719 BARRETT'S ESOPHAGUS WITH DYSPLASIA: Status: RESOLVED | Noted: 2019-04-05 | Resolved: 2022-09-08

## 2022-09-08 PROCEDURE — 3074F SYST BP LT 130 MM HG: CPT | Performed by: FAMILY MEDICINE

## 2022-09-08 PROCEDURE — 3078F DIAST BP <80 MM HG: CPT | Performed by: FAMILY MEDICINE

## 2022-09-08 PROCEDURE — 3008F BODY MASS INDEX DOCD: CPT | Performed by: FAMILY MEDICINE

## 2022-09-08 PROCEDURE — 99214 OFFICE O/P EST MOD 30 MIN: CPT | Performed by: FAMILY MEDICINE

## 2022-09-08 RX ORDER — GABAPENTIN 100 MG/1
100 CAPSULE ORAL 3 TIMES DAILY
Qty: 90 CAPSULE | Refills: 0 | Status: SHIPPED | OUTPATIENT
Start: 2022-09-08

## 2022-10-02 DIAGNOSIS — R39.15 URINARY URGENCY: ICD-10-CM

## 2022-10-02 DIAGNOSIS — E11.9 TYPE 2 DIABETES MELLITUS WITHOUT COMPLICATION, WITHOUT LONG-TERM CURRENT USE OF INSULIN (HCC): ICD-10-CM

## 2022-10-03 RX ORDER — ATORVASTATIN CALCIUM 20 MG/1
20 TABLET, FILM COATED ORAL NIGHTLY
Qty: 90 TABLET | Refills: 0 | Status: SHIPPED | OUTPATIENT
Start: 2022-10-03

## 2022-10-03 RX ORDER — TAMSULOSIN HYDROCHLORIDE 0.4 MG/1
0.4 CAPSULE ORAL DAILY
Qty: 30 CAPSULE | Refills: 0 | Status: SHIPPED | OUTPATIENT
Start: 2022-10-03

## 2022-10-03 RX ORDER — METFORMIN HYDROCHLORIDE 500 MG/1
1000 TABLET, EXTENDED RELEASE ORAL 2 TIMES DAILY
Qty: 360 TABLET | Refills: 0 | Status: SHIPPED | OUTPATIENT
Start: 2022-10-03

## 2022-10-03 RX ORDER — EMPAGLIFLOZIN 25 MG/1
1 TABLET, FILM COATED ORAL DAILY
Qty: 90 TABLET | Refills: 1 | Status: SHIPPED | OUTPATIENT
Start: 2022-10-03

## 2022-10-03 RX ORDER — ATENOLOL 50 MG/1
50 TABLET ORAL DAILY
Qty: 90 TABLET | Refills: 0 | Status: SHIPPED | OUTPATIENT
Start: 2022-10-03

## 2022-10-30 DIAGNOSIS — R39.15 URINARY URGENCY: ICD-10-CM

## 2022-10-31 RX ORDER — TAMSULOSIN HYDROCHLORIDE 0.4 MG/1
CAPSULE ORAL
Qty: 90 CAPSULE | Refills: 0 | Status: SHIPPED | OUTPATIENT
Start: 2022-10-31

## 2022-10-31 NOTE — TELEPHONE ENCOUNTER
Last OV 9.8. 22 w/ [address] (f/up)    Last PE 6.9.22  Last REFILL 10.3.22 Flomax #30 0R  Last LABS 9.7.22 HgA1c, Lipid, CMP, CBC    Future Appointments   Date Time Provider Linda Maddox   12/22/2022 11:20 AM Ezio Rogel MD [facility] [address] [facility]         Per PROTOCOL?  Not on protocol     Please Advise

## 2022-11-15 ENCOUNTER — IMMUNIZATION (OUTPATIENT)
Dept: INTERNAL MEDICINE CLINIC | Facility: CLINIC | Age: 72
End: 2022-11-15
Payer: COMMERCIAL

## 2022-11-15 DIAGNOSIS — Z23 NEED FOR VACCINATION: Primary | ICD-10-CM

## 2022-11-15 PROCEDURE — 90662 IIV NO PRSV INCREASED AG IM: CPT | Performed by: INTERNAL MEDICINE

## 2022-11-15 PROCEDURE — 90471 IMMUNIZATION ADMIN: CPT | Performed by: INTERNAL MEDICINE

## 2022-11-21 RX ORDER — GLIPIZIDE 10 MG/1
10 TABLET, FILM COATED, EXTENDED RELEASE ORAL DAILY
Qty: 90 TABLET | Refills: 0 | Status: SHIPPED | OUTPATIENT
Start: 2022-11-21

## 2022-11-21 NOTE — TELEPHONE ENCOUNTER
PASSED per protocol, refill sent.   Last PE 6.9.22   Future Appointments   Date Time Provider Linda Pagei   12/22/2022 11:20 AM Yovani Sanchez MD [facility] [address] [facility]   2/1/2023  1:40 PM Tobin Singh MD [facility] ECC SUB GI

## 2022-12-18 RX ORDER — ATENOLOL 50 MG/1
50 TABLET ORAL DAILY
Qty: 90 TABLET | Refills: 0 | Status: CANCELLED | OUTPATIENT
Start: 2022-12-18

## 2022-12-19 RX ORDER — ATENOLOL 50 MG/1
TABLET ORAL
Qty: 90 TABLET | Refills: 0 | Status: SHIPPED | OUTPATIENT
Start: 2022-12-19

## 2022-12-19 NOTE — TELEPHONE ENCOUNTER
atenolol 50 MG Oral Tab 90 tablet 0 10/3/2022     Sig - Route: Take 1 tablet (50 mg total) by mouth daily. - Oral    Sent to pharmacy as:  Atenolol 50 MG Oral Tablet (Tenormin)    E-Prescribing Status: Receipt confirmed by pharmacy (10/3/2022 12:40 PM CDT)      Last seen by [facility] in 9/2022

## 2022-12-22 ENCOUNTER — OFFICE VISIT (OUTPATIENT)
Dept: INTERNAL MEDICINE CLINIC | Facility: CLINIC | Age: 72
End: 2022-12-22
Payer: COMMERCIAL

## 2022-12-22 VITALS
OXYGEN SATURATION: 97 % | WEIGHT: 182.38 LBS | DIASTOLIC BLOOD PRESSURE: 70 MMHG | HEART RATE: 62 BPM | BODY MASS INDEX: 22.68 KG/M2 | SYSTOLIC BLOOD PRESSURE: 118 MMHG | RESPIRATION RATE: 18 BRPM | HEIGHT: 75 IN

## 2022-12-22 DIAGNOSIS — Z12.11 SCREENING FOR COLON CANCER: ICD-10-CM

## 2022-12-22 DIAGNOSIS — E11.9 TYPE 2 DIABETES MELLITUS WITHOUT COMPLICATION, WITHOUT LONG-TERM CURRENT USE OF INSULIN (HCC): Primary | ICD-10-CM

## 2022-12-22 DIAGNOSIS — R22.2 LOCALIZED SWELLING OF CHEST WALL: ICD-10-CM

## 2022-12-22 LAB
CARTRIDGE LOT#: 535 NUMERIC
HEMOGLOBIN A1C: 6.9 % (ref 4.3–5.6)

## 2022-12-22 PROCEDURE — 3074F SYST BP LT 130 MM HG: CPT | Performed by: FAMILY MEDICINE

## 2022-12-22 PROCEDURE — 83036 HEMOGLOBIN GLYCOSYLATED A1C: CPT | Performed by: FAMILY MEDICINE

## 2022-12-22 PROCEDURE — 3008F BODY MASS INDEX DOCD: CPT | Performed by: FAMILY MEDICINE

## 2022-12-22 PROCEDURE — 99214 OFFICE O/P EST MOD 30 MIN: CPT | Performed by: FAMILY MEDICINE

## 2022-12-22 PROCEDURE — 3044F HG A1C LEVEL LT 7.0%: CPT | Performed by: FAMILY MEDICINE

## 2022-12-22 PROCEDURE — 3078F DIAST BP <80 MM HG: CPT | Performed by: FAMILY MEDICINE

## 2023-01-10 RX ORDER — METFORMIN HYDROCHLORIDE 500 MG/1
1000 TABLET, EXTENDED RELEASE ORAL 2 TIMES DAILY
Qty: 360 TABLET | Refills: 0 | Status: SHIPPED | OUTPATIENT
Start: 2023-01-10

## 2023-01-10 RX ORDER — ATORVASTATIN CALCIUM 20 MG/1
20 TABLET, FILM COATED ORAL NIGHTLY
Qty: 90 TABLET | Refills: 0 | Status: SHIPPED | OUTPATIENT
Start: 2023-01-10

## 2023-01-10 NOTE — TELEPHONE ENCOUNTER
PASSED per protocol. F/U 3 MONTHS- AROUND 9-9-2022    Last PE: 6-9-2022-MK    Future Appointments   Date Time Provider Linda Maddox   2/1/2023  1:40 PM Jeremy Singh MD [facility] E[facility] SUB GI   3/23/2023 11:20 AM Lam Jarquin MD [facility] [address] [facility]      Patient overdue for 3 months f/u , ok to send 90 days since he has upcoming appt?

## 2023-02-12 DIAGNOSIS — R39.15 URINARY URGENCY: ICD-10-CM

## 2023-02-13 RX ORDER — GLIPIZIDE 10 MG/1
10 TABLET, FILM COATED, EXTENDED RELEASE ORAL DAILY
Qty: 90 TABLET | Refills: 0 | Status: SHIPPED | OUTPATIENT
Start: 2023-02-13

## 2023-02-13 RX ORDER — TAMSULOSIN HYDROCHLORIDE 0.4 MG/1
0.4 CAPSULE ORAL DAILY
Qty: 90 CAPSULE | Refills: 0 | Status: SHIPPED | OUTPATIENT
Start: 2023-02-13

## 2023-02-13 NOTE — TELEPHONE ENCOUNTER
Glipizide-PASSED, refill sent     Last OV 12.22.22 w/ [address] (f/up)   Last PE 6.9.22  Last REFILL 10.31.22 Tamsulosin 0.4mg #90 0R  Last LABS 12.22.22 HgA1c    Future Appointments   Date Time Provider Linda Maddox   3/23/2023 11:20 AM Blake Bower MD [facility] [address] [facility]         Per PROTOCOL?  Not on protocol     Please Advise

## 2023-03-08 NOTE — TELEPHONE ENCOUNTER
Received results from [facility] in regards to pt's DM eye exam.  Results abstracted, placed in MD bin for review. Underweight

## 2023-04-02 DIAGNOSIS — E11.9 TYPE 2 DIABETES MELLITUS WITHOUT COMPLICATION, WITHOUT LONG-TERM CURRENT USE OF INSULIN (HCC): ICD-10-CM

## 2023-04-03 RX ORDER — METFORMIN HYDROCHLORIDE 500 MG/1
1000 TABLET, EXTENDED RELEASE ORAL 2 TIMES DAILY
Qty: 360 TABLET | Refills: 0 | Status: SHIPPED | OUTPATIENT
Start: 2023-04-03

## 2023-04-03 RX ORDER — ATENOLOL 50 MG/1
50 TABLET ORAL DAILY
Qty: 90 TABLET | Refills: 0 | Status: SHIPPED | OUTPATIENT
Start: 2023-04-03

## 2023-04-03 RX ORDER — ATORVASTATIN CALCIUM 20 MG/1
20 TABLET, FILM COATED ORAL NIGHTLY
Qty: 90 TABLET | Refills: 0 | Status: SHIPPED | OUTPATIENT
Start: 2023-04-03

## 2023-04-04 RX ORDER — EMPAGLIFLOZIN 25 MG/1
1 TABLET, FILM COATED ORAL DAILY
Qty: 90 TABLET | Refills: 1 | Status: SHIPPED | OUTPATIENT
Start: 2023-04-04

## 2023-04-24 RX ORDER — SILDENAFIL 50 MG/1
TABLET, FILM COATED ORAL
Qty: 8 TABLET | Refills: 0 | Status: SHIPPED | OUTPATIENT
Start: 2023-04-24

## 2023-04-27 ENCOUNTER — HOSPITAL ENCOUNTER (OUTPATIENT)
Dept: MRI IMAGING | Facility: HOSPITAL | Age: 73
Discharge: HOME | End: 2023-04-27
Attending: INTERNAL MEDICINE
Payer: COMMERCIAL

## 2023-04-27 ENCOUNTER — LAB ENCOUNTER (OUTPATIENT)
Dept: LAB | Facility: HOSPITAL | Age: 73
End: 2023-04-27
Attending: INTERNAL MEDICINE
Payer: COMMERCIAL

## 2023-04-27 DIAGNOSIS — E11.9 TYPE 2 DIABETES MELLITUS WITHOUT COMPLICATION, WITHOUT LONG-TERM CURRENT USE OF INSULIN (HCC): ICD-10-CM

## 2023-04-27 DIAGNOSIS — R93.89 ABNORMAL FINDING ON IMAGING: ICD-10-CM

## 2023-04-27 LAB
ALBUMIN SERPL-MCNC: 3.8 G/DL (ref 3.4–5)
ALBUMIN/GLOB SERPL: 1 {RATIO} (ref 1–2)
ALP LIVER SERPL-CCNC: 77 U/L
ALT SERPL-CCNC: 23 U/L
ANION GAP SERPL CALC-SCNC: 2 MMOL/L (ref 0–18)
AST SERPL-CCNC: 7 U/L (ref 15–37)
BILIRUB SERPL-MCNC: 0.9 MG/DL (ref 0.1–2)
BUN BLD-MCNC: 14 MG/DL (ref 7–18)
CALCIUM BLD-MCNC: 9.1 MG/DL (ref 8.5–10.1)
CHLORIDE SERPL-SCNC: 108 MMOL/L (ref 98–112)
CHOLEST SERPL-MCNC: 130 MG/DL (ref ?–200)
CO2 SERPL-SCNC: 28 MMOL/L (ref 21–32)
CREAT BLD-MCNC: 1.01 MG/DL
EST. AVERAGE GLUCOSE BLD GHB EST-MCNC: 194 MG/DL (ref 68–126)
FASTING PATIENT LIPID ANSWER: YES
FASTING STATUS PATIENT QL REPORTED: YES
GFR SERPLBLD BASED ON 1.73 SQ M-ARVRAT: 80 ML/MIN/1.73M2 (ref 60–?)
GLOBULIN PLAS-MCNC: 3.7 G/DL (ref 2.8–4.4)
GLUCOSE BLD-MCNC: 195 MG/DL (ref 70–99)
HBA1C MFR BLD: 8.4 % (ref ?–5.7)
HDLC SERPL-MCNC: 47 MG/DL (ref 40–59)
LDLC SERPL CALC-MCNC: 64 MG/DL (ref ?–100)
NONHDLC SERPL-MCNC: 83 MG/DL (ref ?–130)
OSMOLALITY SERPL CALC.SUM OF ELEC: 292 MOSM/KG (ref 275–295)
POTASSIUM SERPL-SCNC: 4.5 MMOL/L (ref 3.5–5.1)
PROT SERPL-MCNC: 7.5 G/DL (ref 6.4–8.2)
SODIUM SERPL-SCNC: 138 MMOL/L (ref 136–145)
TRIGL SERPL-MCNC: 105 MG/DL (ref 30–149)
VLDLC SERPL CALC-MCNC: 16 MG/DL (ref 0–30)

## 2023-04-27 PROCEDURE — 76376 3D RENDER W/INTRP POSTPROCES: CPT | Performed by: INTERNAL MEDICINE

## 2023-04-27 PROCEDURE — 36415 COLL VENOUS BLD VENIPUNCTURE: CPT

## 2023-04-27 PROCEDURE — 74183 MRI ABD W/O CNTR FLWD CNTR: CPT | Performed by: INTERNAL MEDICINE

## 2023-04-27 PROCEDURE — 3052F HG A1C>EQUAL 8.0%<EQUAL 9.0%: CPT | Performed by: FAMILY MEDICINE

## 2023-04-27 PROCEDURE — 80061 LIPID PANEL: CPT

## 2023-04-27 PROCEDURE — 83036 HEMOGLOBIN GLYCOSYLATED A1C: CPT

## 2023-04-27 PROCEDURE — A9575 INJ GADOTERATE MEGLUMI 0.1ML: HCPCS | Performed by: INTERNAL MEDICINE

## 2023-04-27 PROCEDURE — 80053 COMPREHEN METABOLIC PANEL: CPT

## 2023-04-27 RX ORDER — GADOTERATE MEGLUMINE 376.9 MG/ML
20 INJECTION INTRAVENOUS
Status: COMPLETED | OUTPATIENT
Start: 2023-04-27 | End: 2023-04-27

## 2023-04-27 RX ADMIN — GADOTERATE MEGLUMINE 16 ML: 376.9 INJECTION INTRAVENOUS at 09:28:00

## 2023-06-02 ENCOUNTER — TELEPHONE (OUTPATIENT)
Dept: INTERNAL MEDICINE CLINIC | Facility: CLINIC | Age: 73
End: 2023-06-02

## 2023-06-02 DIAGNOSIS — R39.15 URINARY URGENCY: ICD-10-CM

## 2023-06-02 DIAGNOSIS — K86.1 CHRONIC PANCREATITIS, UNSPECIFIED PANCREATITIS TYPE (HCC): Primary | ICD-10-CM

## 2023-06-02 RX ORDER — TAMSULOSIN HYDROCHLORIDE 0.4 MG/1
0.4 CAPSULE ORAL DAILY
Qty: 90 CAPSULE | Refills: 0 | Status: SHIPPED | OUTPATIENT
Start: 2023-06-02

## 2023-06-02 RX ORDER — GLIPIZIDE 10 MG/1
10 TABLET, FILM COATED, EXTENDED RELEASE ORAL DAILY
Qty: 90 TABLET | Refills: 0 | Status: SHIPPED | OUTPATIENT
Start: 2023-06-02

## 2023-06-14 ENCOUNTER — OFFICE VISIT (OUTPATIENT)
Dept: INTERNAL MEDICINE CLINIC | Facility: CLINIC | Age: 73
End: 2023-06-14
Payer: COMMERCIAL

## 2023-06-14 VITALS
DIASTOLIC BLOOD PRESSURE: 72 MMHG | WEIGHT: 178 LBS | OXYGEN SATURATION: 99 % | HEART RATE: 66 BPM | TEMPERATURE: 98 F | RESPIRATION RATE: 16 BRPM | SYSTOLIC BLOOD PRESSURE: 116 MMHG | BODY MASS INDEX: 22.13 KG/M2 | HEIGHT: 75 IN

## 2023-06-14 DIAGNOSIS — N40.1 BENIGN PROSTATIC HYPERPLASIA WITH URINARY FREQUENCY: ICD-10-CM

## 2023-06-14 DIAGNOSIS — Z00.00 WELLNESS EXAMINATION: Primary | ICD-10-CM

## 2023-06-14 DIAGNOSIS — I10 ESSENTIAL HYPERTENSION WITH GOAL BLOOD PRESSURE LESS THAN 130/80: ICD-10-CM

## 2023-06-14 DIAGNOSIS — E11.9 TYPE 2 DIABETES MELLITUS WITHOUT COMPLICATION, WITHOUT LONG-TERM CURRENT USE OF INSULIN (HCC): ICD-10-CM

## 2023-06-14 DIAGNOSIS — K22.70 BARRETT'S ESOPHAGUS WITHOUT DYSPLASIA: ICD-10-CM

## 2023-06-14 DIAGNOSIS — R35.0 BENIGN PROSTATIC HYPERPLASIA WITH URINARY FREQUENCY: ICD-10-CM

## 2023-06-14 DIAGNOSIS — E11.40 TYPE 2 DIABETES MELLITUS WITH DIABETIC NEUROPATHY, WITHOUT LONG-TERM CURRENT USE OF INSULIN (HCC): ICD-10-CM

## 2023-06-14 DIAGNOSIS — Z12.11 SCREENING FOR COLON CANCER: ICD-10-CM

## 2023-06-14 DIAGNOSIS — I25.10 CORONARY ARTERY DISEASE INVOLVING NATIVE HEART WITHOUT ANGINA PECTORIS, UNSPECIFIED VESSEL OR LESION TYPE: ICD-10-CM

## 2023-06-14 DIAGNOSIS — E78.00 PURE HYPERCHOLESTEROLEMIA: ICD-10-CM

## 2023-06-14 LAB
CREAT UR-SCNC: 41 MG/DL
MICROALBUMIN UR-MCNC: 0.73 MG/DL
MICROALBUMIN/CREAT 24H UR-RTO: 17.8 UG/MG (ref ?–30)

## 2023-06-14 PROCEDURE — 82570 ASSAY OF URINE CREATININE: CPT | Performed by: FAMILY MEDICINE

## 2023-06-14 PROCEDURE — 3078F DIAST BP <80 MM HG: CPT | Performed by: FAMILY MEDICINE

## 2023-06-14 PROCEDURE — 82043 UR ALBUMIN QUANTITATIVE: CPT | Performed by: FAMILY MEDICINE

## 2023-06-14 PROCEDURE — 3008F BODY MASS INDEX DOCD: CPT | Performed by: FAMILY MEDICINE

## 2023-06-14 PROCEDURE — 3061F NEG MICROALBUMINURIA REV: CPT | Performed by: FAMILY MEDICINE

## 2023-06-14 PROCEDURE — 3074F SYST BP LT 130 MM HG: CPT | Performed by: FAMILY MEDICINE

## 2023-06-14 PROCEDURE — 99397 PER PM REEVAL EST PAT 65+ YR: CPT | Performed by: FAMILY MEDICINE

## 2023-06-14 RX ORDER — GABAPENTIN 100 MG/1
100 CAPSULE ORAL 3 TIMES DAILY
Qty: 90 CAPSULE | Refills: 1 | Status: SHIPPED | OUTPATIENT
Start: 2023-06-14

## 2023-06-14 RX ORDER — TAMSULOSIN HYDROCHLORIDE 0.4 MG/1
0.8 CAPSULE ORAL DAILY
Qty: 180 CAPSULE | Refills: 0 | Status: SHIPPED | OUTPATIENT
Start: 2023-06-14 | End: 2023-09-12

## 2023-06-18 RX ORDER — ATENOLOL 50 MG/1
50 TABLET ORAL DAILY
Qty: 90 TABLET | Refills: 0 | Status: CANCELLED | OUTPATIENT
Start: 2023-06-18

## 2023-06-19 RX ORDER — ATENOLOL 50 MG/1
TABLET ORAL
Qty: 90 TABLET | Refills: 0 | Status: SHIPPED | OUTPATIENT
Start: 2023-06-19

## 2023-07-03 RX ORDER — SILDENAFIL 50 MG/1
50 TABLET, FILM COATED ORAL AS NEEDED
Qty: 8 TABLET | Refills: 2 | Status: SHIPPED | OUTPATIENT
Start: 2023-07-03

## 2023-07-16 DIAGNOSIS — N40.1 BENIGN PROSTATIC HYPERPLASIA WITH URINARY FREQUENCY: ICD-10-CM

## 2023-07-16 DIAGNOSIS — R35.0 BENIGN PROSTATIC HYPERPLASIA WITH URINARY FREQUENCY: ICD-10-CM

## 2023-07-17 RX ORDER — TAMSULOSIN HYDROCHLORIDE 0.4 MG/1
0.8 CAPSULE ORAL DAILY
Qty: 180 CAPSULE | Refills: 0 | Status: SHIPPED | OUTPATIENT
Start: 2023-07-17 | End: 2023-10-15

## 2023-07-19 ENCOUNTER — TELEPHONE (OUTPATIENT)
Dept: INTERNAL MEDICINE CLINIC | Facility: CLINIC | Age: 73
End: 2023-07-19

## 2023-07-19 NOTE — TELEPHONE ENCOUNTER
Received consult notes from [facility]. Abstracted. Placed on [facility] desk.

## 2023-07-24 RX ORDER — ATORVASTATIN CALCIUM 20 MG/1
20 TABLET, FILM COATED ORAL NIGHTLY
Qty: 90 TABLET | Refills: 0 | Status: SHIPPED | OUTPATIENT
Start: 2023-07-24

## 2023-07-24 RX ORDER — METFORMIN HYDROCHLORIDE 500 MG/1
1000 TABLET, EXTENDED RELEASE ORAL 2 TIMES DAILY
Qty: 360 TABLET | Refills: 0 | Status: SHIPPED | OUTPATIENT
Start: 2023-07-24

## 2023-07-24 NOTE — TELEPHONE ENCOUNTER
Atorvastatin  Last OV: 06/14/23  Last refill date: 04/03/23     #/refills: 90/0  Upcoming appt:    Future Appointments   Date Time Provider Linda Maddox   12/15/2023  9:00 AM Herson Hernandez MD [facility]

## 2023-08-09 ENCOUNTER — LAB ENCOUNTER (OUTPATIENT)
Dept: LAB | Facility: HOSPITAL | Age: 73
End: 2023-08-09
Attending: FAMILY MEDICINE
Payer: COMMERCIAL

## 2023-08-09 DIAGNOSIS — E11.40 TYPE 2 DIABETES MELLITUS WITH DIABETIC NEUROPATHY, WITHOUT LONG-TERM CURRENT USE OF INSULIN (HCC): ICD-10-CM

## 2023-08-09 DIAGNOSIS — K86.0 ALCOHOL-INDUCED CHRONIC PANCREATITIS (HCC): ICD-10-CM

## 2023-08-09 LAB
ALBUMIN SERPL-MCNC: 3.7 G/DL (ref 3.4–5)
ALBUMIN/GLOB SERPL: 1 {RATIO} (ref 1–2)
ALP LIVER SERPL-CCNC: 79 U/L
ALT SERPL-CCNC: 23 U/L
ANION GAP SERPL CALC-SCNC: 3 MMOL/L (ref 0–18)
AST SERPL-CCNC: 9 U/L (ref 15–37)
BILIRUB SERPL-MCNC: 0.9 MG/DL (ref 0.1–2)
BUN BLD-MCNC: 19 MG/DL (ref 7–18)
CALCIUM BLD-MCNC: 8.9 MG/DL (ref 8.5–10.1)
CHLORIDE SERPL-SCNC: 107 MMOL/L (ref 98–112)
CO2 SERPL-SCNC: 27 MMOL/L (ref 21–32)
CREAT BLD-MCNC: 1 MG/DL
EGFRCR SERPLBLD CKD-EPI 2021: 80 ML/MIN/1.73M2 (ref 60–?)
EST. AVERAGE GLUCOSE BLD GHB EST-MCNC: 186 MG/DL (ref 68–126)
FASTING STATUS PATIENT QL REPORTED: YES
GLOBULIN PLAS-MCNC: 3.6 G/DL (ref 2.8–4.4)
GLUCOSE BLD-MCNC: 190 MG/DL (ref 70–99)
HBA1C MFR BLD: 8.1 % (ref ?–5.7)
OSMOLALITY SERPL CALC.SUM OF ELEC: 291 MOSM/KG (ref 275–295)
POTASSIUM SERPL-SCNC: 4.6 MMOL/L (ref 3.5–5.1)
PROT SERPL-MCNC: 7.3 G/DL (ref 6.4–8.2)
SODIUM SERPL-SCNC: 137 MMOL/L (ref 136–145)

## 2023-08-09 PROCEDURE — 82787 IGG 1 2 3 OR 4 EACH: CPT

## 2023-08-09 PROCEDURE — 80053 COMPREHEN METABOLIC PANEL: CPT

## 2023-08-09 PROCEDURE — 83036 HEMOGLOBIN GLYCOSYLATED A1C: CPT

## 2023-08-09 PROCEDURE — 36415 COLL VENOUS BLD VENIPUNCTURE: CPT

## 2023-08-09 PROCEDURE — 3052F HG A1C>EQUAL 8.0%<EQUAL 9.0%: CPT | Performed by: FAMILY MEDICINE

## 2023-08-10 LAB
IGG SUBCLASS 1: 694 MG/DL
IGG SUBCLASS 2: 334 MG/DL
IGG SUBCLASS 3: 64 MG/DL
IGG SUBCLASS 4: 46 MG/DL

## 2023-08-13 ENCOUNTER — IMMUNIZATION (OUTPATIENT)
Dept: LAB | Age: 73
End: 2023-08-13
Attending: EMERGENCY MEDICINE
Payer: COMMERCIAL

## 2023-08-13 DIAGNOSIS — Z23 NEED FOR VACCINATION: Primary | ICD-10-CM

## 2023-08-13 PROCEDURE — 82274 ASSAY TEST FOR BLOOD FECAL: CPT

## 2023-08-13 PROCEDURE — 82656 EL-1 FECAL QUAL/SEMIQ: CPT

## 2023-08-13 PROCEDURE — 0124A SARSCOV2 VAC BVL 30MCG/0.3ML: CPT

## 2023-08-13 NOTE — PROGRESS NOTES
Torres,    Normal immunoglobulin G levels, no indication of immune-related pancreatitis.   Please call with any questions,    Manjinder Weir MD

## 2023-08-14 ENCOUNTER — LAB ENCOUNTER (OUTPATIENT)
Dept: LAB | Facility: HOSPITAL | Age: 73
End: 2023-08-14
Attending: FAMILY MEDICINE
Payer: COMMERCIAL

## 2023-08-14 DIAGNOSIS — K86.0 ALCOHOL-INDUCED CHRONIC PANCREATITIS (HCC): ICD-10-CM

## 2023-08-14 DIAGNOSIS — Z12.11 SCREENING FOR COLON CANCER: ICD-10-CM

## 2023-08-14 LAB — HEMOCCULT STL QL: NEGATIVE

## 2023-08-17 LAB — PANCREATIC ELAST FECAL: <50 UG ELAST./G

## 2023-09-05 DIAGNOSIS — R35.0 BENIGN PROSTATIC HYPERPLASIA WITH URINARY FREQUENCY: ICD-10-CM

## 2023-09-05 DIAGNOSIS — N40.1 BENIGN PROSTATIC HYPERPLASIA WITH URINARY FREQUENCY: ICD-10-CM

## 2023-09-07 ENCOUNTER — OFFICE VISIT (OUTPATIENT)
Dept: INTERNAL MEDICINE CLINIC | Facility: CLINIC | Age: 73
End: 2023-09-07
Payer: COMMERCIAL

## 2023-09-07 VITALS
BODY MASS INDEX: 22 KG/M2 | SYSTOLIC BLOOD PRESSURE: 120 MMHG | DIASTOLIC BLOOD PRESSURE: 58 MMHG | OXYGEN SATURATION: 98 % | WEIGHT: 179 LBS | HEART RATE: 68 BPM

## 2023-09-07 DIAGNOSIS — R35.0 BENIGN PROSTATIC HYPERPLASIA WITH URINARY FREQUENCY: ICD-10-CM

## 2023-09-07 DIAGNOSIS — I10 ESSENTIAL HYPERTENSION WITH GOAL BLOOD PRESSURE LESS THAN 130/80: ICD-10-CM

## 2023-09-07 DIAGNOSIS — N40.1 BENIGN PROSTATIC HYPERPLASIA WITH URINARY FREQUENCY: ICD-10-CM

## 2023-09-07 DIAGNOSIS — E11.40 TYPE 2 DIABETES MELLITUS WITH DIABETIC NEUROPATHY, WITHOUT LONG-TERM CURRENT USE OF INSULIN (HCC): Primary | ICD-10-CM

## 2023-09-07 DIAGNOSIS — N52.9 ERECTILE DYSFUNCTION, UNSPECIFIED ERECTILE DYSFUNCTION TYPE: ICD-10-CM

## 2023-09-07 DIAGNOSIS — E78.5 DYSLIPIDEMIA: ICD-10-CM

## 2023-09-07 LAB
CARTRIDGE LOT#: 595 NUMERIC
HEMOGLOBIN A1C: 7.5 % (ref 4.3–5.6)

## 2023-09-07 PROCEDURE — 3074F SYST BP LT 130 MM HG: CPT | Performed by: FAMILY MEDICINE

## 2023-09-07 PROCEDURE — 3051F HG A1C>EQUAL 7.0%<8.0%: CPT | Performed by: FAMILY MEDICINE

## 2023-09-07 PROCEDURE — 3078F DIAST BP <80 MM HG: CPT | Performed by: FAMILY MEDICINE

## 2023-09-07 PROCEDURE — 99214 OFFICE O/P EST MOD 30 MIN: CPT | Performed by: FAMILY MEDICINE

## 2023-09-07 PROCEDURE — 83036 HEMOGLOBIN GLYCOSYLATED A1C: CPT | Performed by: FAMILY MEDICINE

## 2023-09-07 RX ORDER — TAMSULOSIN HYDROCHLORIDE 0.4 MG/1
0.8 CAPSULE ORAL DAILY
Qty: 180 CAPSULE | Refills: 0 | OUTPATIENT
Start: 2023-09-07 | End: 2023-12-06

## 2023-09-07 RX ORDER — SILDENAFIL 100 MG/1
100 TABLET, FILM COATED ORAL
Qty: 30 TABLET | Refills: 0 | Status: SHIPPED | OUTPATIENT
Start: 2023-09-07

## 2023-09-07 RX ORDER — GLIPIZIDE 10 MG/1
10 TABLET, FILM COATED, EXTENDED RELEASE ORAL DAILY
Qty: 90 TABLET | Refills: 0 | OUTPATIENT
Start: 2023-09-07

## 2023-09-07 RX ORDER — TAMSULOSIN HYDROCHLORIDE 0.4 MG/1
0.8 CAPSULE ORAL DAILY
Qty: 180 CAPSULE | Refills: 0 | Status: SHIPPED | OUTPATIENT
Start: 2023-09-07 | End: 2023-12-06

## 2023-09-07 RX ORDER — GLIPIZIDE 10 MG/1
10 TABLET, FILM COATED, EXTENDED RELEASE ORAL 2 TIMES DAILY WITH MEALS
Qty: 90 TABLET | Refills: 0 | Status: SHIPPED | OUTPATIENT
Start: 2023-09-07

## 2023-09-07 NOTE — TELEPHONE ENCOUNTER
Requested Prescriptions     Pending Prescriptions Disp Refills    glipiZIDE ER 10 MG Oral Tablet 24 Hr 90 tablet 0     Sig: Take 1 tablet (10 mg total) by mouth daily. tamsulosin 0.4 MG Oral Cap 180 capsule 0     Sig: Take 2 capsules (0.8 mg total) by mouth daily.        LOV: 6/14/23    LAST PHYSICAL: 6/14/23    LAST REFILL: [id number]/17/23  [id number]-35-3/2/23    LAST LAB: 8/9/23    Your Appointments      Thursday September 07, 2023  4:40 PM  Follow up - Extended with Joycelyn Salmon MD  [address] ([facility] [place]) [address] [place] [address]  [phone]        Friday December 15, 2023  9:00 AM  Follow up - Extended with Joycelyn Salmon MD  [address] ([facility]/ [facility]) [address] [address] [phone]

## 2023-09-11 ENCOUNTER — TELEPHONE (OUTPATIENT)
Dept: INTERNAL MEDICINE CLINIC | Facility: CLINIC | Age: 73
End: 2023-09-11

## 2023-09-11 NOTE — TELEPHONE ENCOUNTER
Received denial fax. Per fax, \"Must request either Cialis (tadalafil) 2.5 mg or 5 mg to treat your condition\". [address], would you send tadalafil or continue with using good rx for sidenafil?

## 2023-09-11 NOTE — TELEPHONE ENCOUNTER
Denied    Details of this decision are provided on the physician outcome notice which has been faxed to the number on file. Case ID: [id number]      Payer: [facility]    [phone]   Electronic appeal: Not supported   View History    Medication Being Authorized     Sildenafil Citrate 100 MG Oral Tab    Take 1 tablet (100 mg total) by mouth daily as needed for Erectile Dysfunction. Dispense: 30 tablet Refills: 0     Start: 9/7/2023      Class: Print Script Diagnoses: Erectile dysfunction, unspecified erectile dysfunction type     This order has been released to its destination. To be filled at: None             PA denied.  Advised to use Good Rx

## 2023-10-02 RX ORDER — ATORVASTATIN CALCIUM 20 MG/1
20 TABLET, FILM COATED ORAL NIGHTLY
Qty: 90 TABLET | Refills: 0 | Status: SHIPPED | OUTPATIENT
Start: 2023-10-02

## 2023-10-02 RX ORDER — ATENOLOL 50 MG/1
50 TABLET ORAL DAILY
Qty: 90 TABLET | Refills: 0 | Status: SHIPPED | OUTPATIENT
Start: 2023-10-02

## 2023-10-02 NOTE — TELEPHONE ENCOUNTER
Refill request sent to the wrong office  Last OV: 09/07/23    Atenolol 50 mg  Last refill date: 06/19/23     #/refills: 90/0    Atorvastatin 20 mg  Last refill date: 07/24/23     #/refills: 90/0      Upcoming appt:    Future Appointments   Date Time Provider Linda Varsha   11/2/2023  8:30 AM Vic Pardo MD [facility]   1/25/2024 11:20 AM Nora Marques MD [facility]

## 2023-10-15 DIAGNOSIS — R35.0 BENIGN PROSTATIC HYPERPLASIA WITH URINARY FREQUENCY: ICD-10-CM

## 2023-10-15 DIAGNOSIS — N40.1 BENIGN PROSTATIC HYPERPLASIA WITH URINARY FREQUENCY: ICD-10-CM

## 2023-10-16 RX ORDER — METFORMIN HYDROCHLORIDE 500 MG/1
1000 TABLET, EXTENDED RELEASE ORAL 2 TIMES DAILY
Qty: 360 TABLET | Refills: 0 | Status: SHIPPED | OUTPATIENT
Start: 2023-10-16

## 2023-10-16 RX ORDER — TAMSULOSIN HYDROCHLORIDE 0.4 MG/1
0.8 CAPSULE ORAL DAILY
Qty: 180 CAPSULE | Refills: 0 | Status: SHIPPED | OUTPATIENT
Start: 2023-10-16 | End: 2024-01-14

## 2023-10-16 NOTE — TELEPHONE ENCOUNTER
Metformin     Last OV: 09/07/23  Last refill date: 07/24/23 #/refills: 360/0  Upcoming appt:    Future Appointments   Date Time Provider Linda Maddox   11/2/2023  8:30 AM Bella Mixon MD [facility]   1/25/2024 11:20 AM Chao Villalpando MD [facility]

## 2023-10-16 NOTE — TELEPHONE ENCOUNTER
Last visit 9/7/23    Medication Quantity Refills Start End   tamsulosin 0.4 MG Oral Cap 180 capsule 0 9/7/2023 12/6/2023   Sig:   Take 2 capsules (0.8 mg total) by mouth daily.      Route:   Oral     Order #:   [id number]

## 2023-11-02 ENCOUNTER — OFFICE VISIT (OUTPATIENT)
Dept: SURGERY | Facility: CLINIC | Age: 73
End: 2023-11-02
Payer: COMMERCIAL

## 2023-11-02 DIAGNOSIS — N52.9 ERECTILE DYSFUNCTION OF ORGANIC ORIGIN: Primary | ICD-10-CM

## 2023-11-02 DIAGNOSIS — N13.8 BPH WITH OBSTRUCTION/LOWER URINARY TRACT SYMPTOMS: ICD-10-CM

## 2023-11-02 DIAGNOSIS — R35.0 BENIGN PROSTATIC HYPERPLASIA WITH URINARY FREQUENCY: ICD-10-CM

## 2023-11-02 DIAGNOSIS — N40.1 BPH WITH OBSTRUCTION/LOWER URINARY TRACT SYMPTOMS: ICD-10-CM

## 2023-11-02 DIAGNOSIS — N20.0 NEPHROLITHIASIS: ICD-10-CM

## 2023-11-02 DIAGNOSIS — N40.1 BENIGN PROSTATIC HYPERPLASIA WITH URINARY FREQUENCY: ICD-10-CM

## 2023-11-02 LAB
APPEARANCE: CLEAR
BILIRUBIN: NEGATIVE
GLUCOSE (URINE DIPSTICK): 500 MG/DL
KETONES (URINE DIPSTICK): NEGATIVE MG/DL
LEUKOCYTES: NEGATIVE
MULTISTIX LOT#: ABNORMAL NUMERIC
NITRITE, URINE: NEGATIVE
OCCULT BLOOD: NEGATIVE
PH, URINE: 5.5 (ref 4.5–8)
PROTEIN (URINE DIPSTICK): NEGATIVE MG/DL
SPECIFIC GRAVITY: 1.01 (ref 1–1.03)
URINE-COLOR: YELLOW
UROBILINOGEN,SEMI-QN: 0.2 MG/DL (ref 0–1.9)

## 2023-11-02 PROCEDURE — 81003 URINALYSIS AUTO W/O SCOPE: CPT | Performed by: SURGERY

## 2023-11-02 PROCEDURE — 99215 OFFICE O/P EST HI 40 MIN: CPT | Performed by: SURGERY

## 2023-11-02 PROCEDURE — 51798 US URINE CAPACITY MEASURE: CPT | Performed by: SURGERY

## 2023-11-02 RX ORDER — TROSPIUM CHLORIDE ER 60 MG/1
60 CAPSULE ORAL DAILY
Qty: 90 CAPSULE | Refills: 5 | Status: SHIPPED | OUTPATIENT
Start: 2023-11-02

## 2023-11-02 RX ORDER — TAMSULOSIN HYDROCHLORIDE 0.4 MG/1
0.8 CAPSULE ORAL DAILY
Qty: 180 CAPSULE | Refills: 5 | Status: SHIPPED | OUTPATIENT
Start: 2023-11-02

## 2023-11-02 RX ORDER — FINASTERIDE 5 MG/1
5 TABLET, FILM COATED ORAL DAILY
Qty: 90 TABLET | Refills: 6 | Status: SHIPPED | OUTPATIENT
Start: 2023-11-02

## 2023-11-02 NOTE — PROGRESS NOTES
Urology Clinic Note    Referring Provider:  Juan Trujillo MD  [address] [facility] [address] [place] [address]  232 [facility],  [address]     Primary Care Provider:  Juan Trujillo MD     Chief Complaint:   BPH/LUTS, ED, nephrolithiasis    HPI:   Radhika Carreno is a 68year old male with history of OA, nephrolithiasis, Crea [place], CAD status post PCI, diabetes, hypertension, hyperlipidemia, nephrolithiasis, BPH/LUTS on tamsulosin 0.8 mg nightly previously followed by Dr. Jake Salazar last seen on 5/5/2022. At that time he was instructed to decrease caffeinated beverages and continue tamsulosin. I reviewed his CT from 6/2/2022 and that showed prostate volume 70 to mL, several small bilateral nonobstructing renal stones largest 6 mm on the left, multiple small simple renal cysts bilaterally. He endorses weak and intermittent urinary stream, urinary frequency, urinary urgency and occasional urge incontinence, nocturia 5 times per night. He does not drink any caffeine or alcohol. He denies gross hematuria. He notes dry ejaculation, likely related to tamsulosin. Denies sleep apnea. He also has erectile dysfunction and uses sildenafil. They recently increased the dose to 100 mg but he has not tried this very much yet. AUA symptom score is 27/mostly dissatisfied with LUTS. Post-void residual bladder scan: 42 mL    Urinalysis (clinic dip): Negative    PSA:  Lab Results   Component Value Date    PSA 0.66 03/28/2022    PSA 0.99 11/15/2019    PSAS 0.74 11/02/2021    QPSA 0.5 01/20/2012        History:     Past Medical History:   Diagnosis Date    Arthritis 03/2022    back    Back pain 01/26/2022    Calculus of kidney     Coronary atherosclerosis     Diabetes ([facility]     Diabetes mellitus ([facility] 2016?     Dizziness 01/2022    3 episodes of balance issues    Elevated blood pressure reading without diagnosis of hypertension     Encounter for screening fecal occult blood testing 05/17/2017    Negative for Occult Blood    Essential hypertension     Frequent urination     Heart attack ([address]     w/stent placememnt    High blood pressure     High cholesterol     Hyperlipidemia     Kidney stone     Pure hypercholesterolemia     Rash 2019    occassioanal on shin    Sleep disturbance my wife    Stented coronary artery     Visual impairment     contacts and glasses    Wears glasses     Weight loss 03/26/2022    Quick 15 lb loss, reason? Past Surgical History:   Procedure Laterality Date    ANGIOPLASTY (CORONARY)      2 stents    CATH BARE METAL STENT (BMS)      CHOLECYSTECTOMY      1985    EGD N/A 9/30/2016    Procedure: ESOPHAGOGASTRODUODENOSCOPY (EGD); Surgeon: Fanny Arroyo DO;  [facility]: [facility] ENDOSCOPY    NECK/CHEST PROCEDURE UNLISTED  1998    ruptured vertebrae-cervical    PERCUTANEOUS  ANGIOPLASTY  (PTCA)- PBP ONLY      REMOVAL GALLBLADDER      SPINE SURGERY PROCEDURE UNLISTED  neck surgery in 2002       Family History   Problem Relation Age of Onset    Other (Acyte Appendicitis) Son     Hypertension Other         fam hx    Diabetes Mother     Diabetes Sister     Diabetes Sister     Hypertension Sister     Diabetes Brother     Hypertension Brother     Heart Attack Brother     Hypertension Sister     Hypertension Brother     Heart Attack Brother        Social History     Socioeconomic History    Marital status:    Tobacco Use    Smoking status: Never    Smokeless tobacco: Never   Vaping Use    Vaping Use: Never used   Substance and Sexual Activity    Alcohol use: Not Currently     Comment: Quit Jan 2022. Used to have a [place] a day. Drug use: No    Sexual activity: Yes     Partners: Female   Other Topics Concern    Caffeine Concern No    Exercise Yes       Medications (Active prior to today's visit):  Current Outpatient Medications   Medication Sig Dispense Refill    metFORMIN  MG Oral Tablet 24 Hr Take 2 tablets (1,000 mg total) by mouth 2 (two) times daily.  360 tablet 0    tamsulosin 0.4 MG Oral Cap Take 2 capsules (0.8 mg total) by mouth daily. 180 capsule 0    atenolol 50 MG Oral Tab Take 1 tablet (50 mg total) by mouth daily. 90 tablet 0    atorvastatin 20 MG Oral Tab Take 1 tablet (20 mg total) by mouth nightly. 90 tablet 0    Sildenafil Citrate 100 MG Oral Tab Take 1 tablet (100 mg total) by mouth daily as needed for Erectile Dysfunction. 30 tablet 0    glipiZIDE ER 10 MG Oral Tablet 24 Hr Take 1 tablet (10 mg total) by mouth 2 (two) times daily with meals. 90 tablet 0    atorvastatin 20 MG Oral Tab Take 1 tablet (20 mg total) by mouth nightly. 90 tablet 0    pantoprazole 40 MG Oral Tab EC Take one tablet (40 mg total) by mouth twice daily, 30 minutes prior to breakfast and 30 minutes prior to dinner. 180 tablet 3    Empagliflozin (JARDIANCE) 25 MG Oral Tab Take 1 tablet by mouth daily. 90 tablet 1    Cyanocobalamin (VITAMIN B-12) 2500 MCG Sublingual SL Tab Place 1,500 mcg under the tongue. 0    AMLODIPINE 2.5 MG Oral Tab Take 1 tablet by mouth once daily 90 tablet 0    BENFOTIAMINE OR Take by mouth. Coenzyme Q10 (COQ-10 OR) Take by mouth. aspirin 81 MG Oral Tab EC Take 1 tablet (81 mg total) by mouth daily. 0    losartan 100 MG Oral Tab Take 1 tablet (100 mg total) by mouth daily. Multiple Vitamin (TAB-A-VANITA) Oral Tab Take 1 tablet by mouth daily. Allergies:  No Known Allergies      Review of Systems:   A comprehensive 10-point review of systems was completed. Pertinent positives and negatives are noted in the the HPI.     Physical Exam:   CONSTITUTIONAL: Well developed, well nourished, in no acute distress  NEUROLOGIC: Alert and oriented  HEAD: Normocephalic, atraumatic  EYES: Sclera non-icteric  ENT: Hearing intact, moist mucous membranes  NECK: No obvious goiter or masses  RESPIRATORY: Normal respiratory effort  SKIN: No evident rashes  ABDOMEN: Soft, non-tender, non-distended  GENITOURINARY: Normal phallus, orthotopic meatus, normal bilateral testicles  DIGITAL RECTAL EXAM: ~60 gram prostate, no nodules or tenderness    Assessment & Plan:   Anton Clark is a 68year old male with history of OA, nephrolithiasis, Crea [place], CAD status post PCI, diabetes, hypertension, hyperlipidemia, nephrolithiasis, BPH/LUTS on tamsulosin 0.8 mg nightly previously followed by Dr. Tate Heading last seen on 5/5/2022. At that time he was instructed to decrease caffeinated beverages and continue tamsulosin. I reviewed his CT from 6/2/2022 and that showed prostate volume 70 to mL, several small bilateral nonobstructing renal stones largest 6 mm on the left, multiple small simple renal cysts bilaterally. He endorses weak and intermittent urinary stream, urinary frequency, urinary urgency and occasional urge incontinence, nocturia 5 times per night. He does not drink any caffeine or alcohol. He denies gross hematuria. He notes dry ejaculation, likely related to tamsulosin. Denies sleep apnea. He also has erectile dysfunction and uses sildenafil. They recently increased the dose to 100 mg but he has not tried this very much yet. -Continue tamsulosin 0.8 mg nightly  -Continue Viagra 100 mg as needed  -Start finasteride 5 mg daily  -Start trospium 60 mg extended release daily  -Return to clinic for cystoscopy  -CT abdomen/pelvis without contrast to assess stone burden      Thank you for this consult. I have personally reviewed all relevant medical records, labs, and imaging. In total, 40 minutes were spent on this patient encounter (including chart review, patient history, physical, and counseling, documentation, and communication). Esteban Monday.  Deborah Saldaña MD  Staff Urologist  [place] [address]  Office: [phone]

## 2023-11-05 DIAGNOSIS — E11.9 TYPE 2 DIABETES MELLITUS WITHOUT COMPLICATION, WITHOUT LONG-TERM CURRENT USE OF INSULIN (HCC): ICD-10-CM

## 2023-11-08 RX ORDER — EMPAGLIFLOZIN 25 MG/1
1 TABLET, FILM COATED ORAL DAILY
Qty: 90 TABLET | Refills: 1 | Status: SHIPPED | OUTPATIENT
Start: 2023-11-08

## 2023-11-08 NOTE — TELEPHONE ENCOUNTER
Requested Prescriptions     Pending Prescriptions Disp Refills    Empagliflozin (JARDIANCE) 25 MG Oral Tab 90 tablet 1     Sig: Take 1 tablet by mouth daily.        LOV: 9/7/23    LAST PHYSICAL: 6/14/23    LAST REFILL: [id number]-05-1/4/23    LAST LAB: 9/7/23    Your Appointments      Thursday December 21, 2023 11:30 AM  Procedure with Mary Velasquez MD  [address], [address] [place] [address] ([address] [place] [address]) [address] [facility],5[address] Floor [address] [address] [address] 730 9638        Thursday January 25, 2024 11:20 AM  Follow up - Extended with Frida Doss MD  [address], [address] ([facility]/ [facility]) 100 [facility]  [phone]

## 2023-11-19 DIAGNOSIS — E11.40 TYPE 2 DIABETES MELLITUS WITH DIABETIC NEUROPATHY, WITHOUT LONG-TERM CURRENT USE OF INSULIN (HCC): ICD-10-CM

## 2023-11-21 RX ORDER — GLIPIZIDE 10 MG/1
10 TABLET, FILM COATED, EXTENDED RELEASE ORAL 2 TIMES DAILY WITH MEALS
Qty: 90 TABLET | Refills: 0 | OUTPATIENT
Start: 2023-11-21

## 2023-11-21 RX ORDER — GLIPIZIDE 10 MG/1
10 TABLET, FILM COATED, EXTENDED RELEASE ORAL 2 TIMES DAILY WITH MEALS
Qty: 90 TABLET | Refills: 0 | Status: SHIPPED | OUTPATIENT
Start: 2023-11-21

## 2023-11-29 ENCOUNTER — HOSPITAL ENCOUNTER (OUTPATIENT)
Dept: CT IMAGING | Facility: HOSPITAL | Age: 73
Discharge: HOME | End: 2023-11-29
Attending: SURGERY
Payer: COMMERCIAL

## 2023-11-29 DIAGNOSIS — N20.0 NEPHROLITHIASIS: ICD-10-CM

## 2023-11-29 PROCEDURE — 74176 CT ABD & PELVIS W/O CONTRAST: CPT | Performed by: SURGERY

## 2023-12-07 ENCOUNTER — IMMUNIZATION (OUTPATIENT)
Dept: LAB | Age: 73
End: 2023-12-07
Attending: EMERGENCY MEDICINE
Payer: COMMERCIAL

## 2023-12-07 DIAGNOSIS — Z23 NEED FOR VACCINATION: Primary | ICD-10-CM

## 2023-12-07 PROCEDURE — 90480 ADMN SARSCOV2 VAC 1/ONLY CMP: CPT

## 2023-12-21 ENCOUNTER — TELEPHONE (OUTPATIENT)
Dept: SURGERY | Facility: CLINIC | Age: 73
End: 2023-12-21

## 2023-12-28 RX ORDER — ATENOLOL 50 MG/1
50 TABLET ORAL DAILY
Qty: 90 TABLET | Refills: 0 | OUTPATIENT
Start: 2023-12-28

## 2024-01-02 RX ORDER — ATENOLOL 50 MG/1
50 TABLET ORAL DAILY
Qty: 90 TABLET | Refills: 0 | Status: SHIPPED | OUTPATIENT
Start: 2024-01-02

## 2024-01-02 NOTE — TELEPHONE ENCOUNTER
Last VISIT:09/07/2023 MD PRANAY    Last CPE:06/14/2023 MD PRANAY    Last REFILL:10/02/2023   Medication Quantity Refills Start End   atenolol 50 MG Oral Tab 90 tablet 0         Last LABS:08/09/2023    Future Appointments   Date Time Provider Department Center   1/25/2024 11:30 AM Jose Marie MD [facility]         Per PROTOCOL?Passed Protocol

## 2024-01-12 ENCOUNTER — TELEPHONE (OUTPATIENT)
Dept: SURGERY | Facility: CLINIC | Age: 74
End: 2024-01-12

## 2024-01-15 RX ORDER — METFORMIN HYDROCHLORIDE 500 MG/1
1000 TABLET, EXTENDED RELEASE ORAL 2 TIMES DAILY
Qty: 360 TABLET | Refills: 0 | Status: SHIPPED | OUTPATIENT
Start: 2024-01-15

## 2024-01-16 RX ORDER — METFORMIN HYDROCHLORIDE 500 MG/1
1000 TABLET, EXTENDED RELEASE ORAL 2 TIMES DAILY
Qty: 360 TABLET | Refills: 0 | Status: SHIPPED | OUTPATIENT
Start: 2024-01-16

## 2024-01-17 DIAGNOSIS — E11.40 TYPE 2 DIABETES MELLITUS WITH DIABETIC NEUROPATHY, WITHOUT LONG-TERM CURRENT USE OF INSULIN (HCC): ICD-10-CM

## 2024-01-18 RX ORDER — GLIPIZIDE 10 MG/1
10 TABLET, FILM COATED, EXTENDED RELEASE ORAL 2 TIMES DAILY WITH MEALS
Qty: 90 TABLET | Refills: 0 | Status: SHIPPED | OUTPATIENT
Start: 2024-01-18

## 2024-01-18 NOTE — TELEPHONE ENCOUNTER
Last VISIT:09/07/2023 MD PRANAY    Last CPE:06/14/2023 MD PRANAY    Last REFILL:11/21/2023   Medication Quantity Refills Start End   GLIPIZIDE ER 10 MG Oral Tablet 24 Hr 90 tablet 0         Last LABS:08/09/2023    Future Appointments   Date Time Provider Department Center   1/25/2024 11:30 AM Jose Marie MD [facility]         Per PROTOCOL? Non Protocol    Please Approve or Deny.

## 2024-01-25 ENCOUNTER — OFFICE VISIT (OUTPATIENT)
Dept: INTERNAL MEDICINE CLINIC | Facility: CLINIC | Age: 74
End: 2024-01-25
Payer: COMMERCIAL

## 2024-01-25 VITALS
TEMPERATURE: 98 F | BODY MASS INDEX: 23 KG/M2 | WEIGHT: 180 LBS | OXYGEN SATURATION: 98 % | DIASTOLIC BLOOD PRESSURE: 50 MMHG | SYSTOLIC BLOOD PRESSURE: 102 MMHG | HEART RATE: 67 BPM

## 2024-01-25 DIAGNOSIS — E78.5 DYSLIPIDEMIA: ICD-10-CM

## 2024-01-25 DIAGNOSIS — E11.40 TYPE 2 DIABETES MELLITUS WITH DIABETIC NEUROPATHY, WITHOUT LONG-TERM CURRENT USE OF INSULIN (HCC): Primary | ICD-10-CM

## 2024-01-25 DIAGNOSIS — I10 ESSENTIAL HYPERTENSION WITH GOAL BLOOD PRESSURE LESS THAN 130/80: ICD-10-CM

## 2024-01-25 PROCEDURE — 3074F SYST BP LT 130 MM HG: CPT | Performed by: FAMILY MEDICINE

## 2024-01-25 PROCEDURE — 99214 OFFICE O/P EST MOD 30 MIN: CPT | Performed by: FAMILY MEDICINE

## 2024-01-25 PROCEDURE — 3078F DIAST BP <80 MM HG: CPT | Performed by: FAMILY MEDICINE

## 2024-01-25 NOTE — PROGRESS NOTES
Torres Darnell  6/13/1950    Chief Complaint   Patient presents with    Follow - Up     DM f/u       HPI:   Torres Darnell is a 73 year old male who presents for follow-up. He recently returned from vacation and his diet greatly suffered. He has been consistent with his current medications. He did not get his labs done. He has been using a cream in the AM for his neuropathy which provides improvement.     Current Outpatient Medications   Medication Sig Dispense Refill    glipiZIDE ER 10 MG Oral Tablet 24 Hr Take 1 tablet (10 mg total) by mouth 2 (two) times daily with meals. 90 tablet 0    metFORMIN  MG Oral Tablet 24 Hr Take 2 tablets (1,000 mg total) by mouth 2 (two) times daily. 360 tablet 0    ATENOLOL 50 MG Oral Tab Take 1 tablet by mouth once daily 90 tablet 0    Empagliflozin (JARDIANCE) 25 MG Oral Tab Take 1 tablet by mouth daily. 90 tablet 1    finasteride 5 MG Oral Tab Take 1 tablet (5 mg total) by mouth daily. 90 tablet 6    Trospium Chloride ER 60 MG Oral Capsule SR 24 Hr Take 1 capsule (60 mg total) by mouth daily. 90 capsule 5    tamsulosin 0.4 MG Oral Cap Take 2 capsules (0.8 mg total) by mouth daily. 180 capsule 5    atorvastatin 20 MG Oral Tab Take 1 tablet (20 mg total) by mouth nightly. 90 tablet 0    Sildenafil Citrate 100 MG Oral Tab Take 1 tablet (100 mg total) by mouth daily as needed for Erectile Dysfunction. 30 tablet 0    atorvastatin 20 MG Oral Tab Take 1 tablet (20 mg total) by mouth nightly. 90 tablet 0    pantoprazole 40 MG Oral Tab EC Take one tablet (40 mg total) by mouth twice daily, 30 minutes prior to breakfast and 30 minutes prior to dinner. 180 tablet 3    Cyanocobalamin (VITAMIN B-12) 2500 MCG Sublingual SL Tab Place 1,500 mcg under the tongue.  0    AMLODIPINE 2.5 MG Oral Tab Take 1 tablet by mouth once daily 90 tablet 0    BENFOTIAMINE OR Take by mouth.      Coenzyme Q10 (COQ-10 OR) Take by mouth.      aspirin 81 MG Oral Tab EC Take 1 tablet (81 mg total) by  mouth daily.  0    losartan 100 MG Oral Tab Take 1 tablet (100 mg total) by mouth daily.      Multiple Vitamin (TAB-A-VANITA) Oral Tab Take 1 tablet by mouth daily.        No Known Allergies   Past Medical History:   Diagnosis Date    Arthritis 03/2022    back    Back pain 01/26/2022    Calculus of kidney     Coronary atherosclerosis     Diabetes (HCC)     Diabetes mellitus (HCC) 2016?    Dizziness 01/2022    3 episodes of balance issues    Elevated blood pressure reading without diagnosis of hypertension     Encounter for screening fecal occult blood testing 05/17/2017    Negative for Occult Blood    Essential hypertension     Frequent urination     Heart attack (HCC)     w/stent placememnt    High blood pressure     High cholesterol     Hyperlipidemia     Kidney stone     Pure hypercholesterolemia     Rash 2019    occassioanal on shin    Sleep disturbance my wife    Stented coronary artery     Visual impairment     contacts and glasses    Wears glasses     Weight loss 03/26/2022    Quick 15 lb loss, reason?      Patient Active Problem List   Diagnosis    Pure hypercholesterolemia    Tremor of both hands    Gait abnormality    Poor short term memory    Coronary artery disease involving native heart without angina pectoris    Dyslipidemia    Essential hypertension with goal blood pressure less than 130/80    Thrombocytopenia (HCC)    Duodenal ulcer    Left shoulder pain    Gastroesophageal reflux disease without esophagitis    Chronic pancreatitis (HCC)    Sensory motor neuropathy    Type 2 diabetes mellitus with diabetic neuropathy (HCC)    Acute pancreatitis    Pancreatic mass    Back pain of thoracolumbar region    Lumbar disc lesion    Angeles's esophagus without dysplasia    Hiatal hernia      Past Surgical History:   Procedure Laterality Date    ANGIOPLASTY (CORONARY)      2 stents    CATH BARE METAL STENT (BMS)      CHOLECYSTECTOMY      1985    EGD N/A 9/30/2016    Procedure: ESOPHAGOGASTRODUODENOSCOPY (EGD);   Surgeon: Marimar Ziegler DO;  Location:  ENDOSCOPY    NECK/CHEST PROCEDURE UNLISTED  1998    ruptured vertebrae-cervical    PERCUTANEOUS  ANGIOPLASTY  (PTCA)- PBP ONLY      REMOVAL GALLBLADDER      SPINE SURGERY PROCEDURE UNLISTED  neck surgery in 2002      Family History   Problem Relation Age of Onset    Other (Acyte Appendicitis) Son     Hypertension Other         fam hx    Diabetes Mother     Diabetes Sister     Diabetes Sister     Hypertension Sister     Diabetes Brother     Hypertension Brother     Heart Attack Brother     Hypertension Sister     Hypertension Brother     Heart Attack Brother       Social History     Socioeconomic History    Marital status:    Tobacco Use    Smoking status: Never    Smokeless tobacco: Never   Vaping Use    Vaping Use: Never used   Substance and Sexual Activity    Alcohol use: Not Currently     Comment: Quit Jan 2022.  Used to have a [place] a day.    Drug use: No    Sexual activity: Yes     Partners: Female   Other Topics Concern    Caffeine Concern No    Exercise Yes         REVIEW OF SYSTEMS:   GENERAL: feels well otherwise, no recent illness    EXAM:   /50 (BP Location: Right arm, Patient Position: Sitting, Cuff Size: adult)   Pulse 67   Temp 97.6 °F (36.4 °C) (Temporal)   Wt 180 lb (81.6 kg)   SpO2 98%   BMI 22.50 kg/m²   GENERAL: Well developed, well nourished,in no apparent distress  SKIN: No rashes,no suspicious lesions  LUNGS: clear to auscultation  CARDIO: RRR without murmur  Bilateral barefoot skin diabetic exam is abnormal with diabetic monofilament/sensation testing abnormal     ASSESSMENT AND PLAN:   Torres Darnell is a 73 year old male who presents with for follow-up    Type 2 diabetes mellitus with diabetic neuropathy, without long-term current use of insulin (HCC)  Discussed need for intense dietary modifications to improve his glycemic control. We will continue his current medication treatment. Follow-up in 3 months with full DM  labs prior.     Dyslipidemia  Continue statin. Check lipid panel prior to follow-up    Essential hypertension with goal blood pressure less than 130/80  Controlled on current treatment.       All questions were answered and the patient agrees with the plan.     Thank you,  Jose Marie MD

## 2024-01-31 NOTE — TELEPHONE ENCOUNTER
----- Message from Ruslan Montaño MD sent at 1/9/2024  5:55 PM CST -----  Can we reschedule patient's cystoscopy with me?    Thanks  MB    CT shows small bilateral nonobstructing renal stones.

## 2024-02-01 RX ORDER — ATORVASTATIN CALCIUM 20 MG/1
20 TABLET, FILM COATED ORAL NIGHTLY
Qty: 90 TABLET | Refills: 0 | Status: SHIPPED | OUTPATIENT
Start: 2024-02-01

## 2024-02-01 NOTE — TELEPHONE ENCOUNTER
Atorvastatin    Last OV: 1/25/24  Last refill date: 10/02/23 #/refills: 90/0  Upcoming appt:   Future Appointments   Date Time Provider Department Center   4/11/2024 11:30 AM Ruslan Montaño MD [id number]   4/25/2024 10:30 AM Jose Marie MD [facility]

## 2024-03-23 DIAGNOSIS — N52.9 ERECTILE DYSFUNCTION, UNSPECIFIED ERECTILE DYSFUNCTION TYPE: ICD-10-CM

## 2024-03-26 RX ORDER — SILDENAFIL 100 MG/1
100 TABLET, FILM COATED ORAL AS NEEDED
Qty: 30 TABLET | Refills: 0 | Status: SHIPPED | OUTPATIENT
Start: 2024-03-26

## 2024-03-28 RX ORDER — ATENOLOL 50 MG/1
50 TABLET ORAL DAILY
Qty: 90 TABLET | Refills: 0 | Status: SHIPPED | OUTPATIENT
Start: 2024-03-28

## 2024-04-02 NOTE — TELEPHONE ENCOUNTER
S/w pt, first noticed blood in urine last night, and again this morning, lessing amounts of blood as the day goes on. Pt denies having before.  Denies pain, cramping, burning or reduced stream.  Appt today with CB at 4 pm Scheduled date of colonoscopy (as of today):8-7-24  Physician performing colonoscopy:schuyler  Location of colonoscopy:[facility]  Bowel prep reviewed with patient:Instructions reviewed with patient by:ml  Clearances: na

## 2024-04-09 ENCOUNTER — LAB ENCOUNTER (OUTPATIENT)
Dept: LAB | Facility: HOSPITAL | Age: 74
End: 2024-04-09
Attending: FAMILY MEDICINE
Payer: COMMERCIAL

## 2024-04-09 DIAGNOSIS — E78.5 DYSLIPIDEMIA: ICD-10-CM

## 2024-04-09 DIAGNOSIS — I10 ESSENTIAL HYPERTENSION WITH GOAL BLOOD PRESSURE LESS THAN 130/80: ICD-10-CM

## 2024-04-09 DIAGNOSIS — E11.40 TYPE 2 DIABETES MELLITUS WITH DIABETIC NEUROPATHY, WITHOUT LONG-TERM CURRENT USE OF INSULIN (HCC): ICD-10-CM

## 2024-04-09 LAB
ALBUMIN SERPL-MCNC: 4 G/DL (ref 3.4–5)
ALBUMIN/GLOB SERPL: 1.1 {RATIO} (ref 1–2)
ALP LIVER SERPL-CCNC: 71 U/L
ALT SERPL-CCNC: 25 U/L
ANION GAP SERPL CALC-SCNC: 5 MMOL/L (ref 0–18)
AST SERPL-CCNC: 9 U/L (ref 15–37)
BILIRUB SERPL-MCNC: 0.7 MG/DL (ref 0.1–2)
BUN BLD-MCNC: 20 MG/DL (ref 9–23)
CALCIUM BLD-MCNC: 9.5 MG/DL (ref 8.5–10.1)
CHLORIDE SERPL-SCNC: 104 MMOL/L (ref 98–112)
CHOLEST SERPL-MCNC: 134 MG/DL (ref ?–200)
CO2 SERPL-SCNC: 26 MMOL/L (ref 21–32)
CREAT BLD-MCNC: 1.01 MG/DL
CREAT UR-SCNC: 45.7 MG/DL
EGFRCR SERPLBLD CKD-EPI 2021: 79 ML/MIN/1.73M2 (ref 60–?)
EST. AVERAGE GLUCOSE BLD GHB EST-MCNC: 212 MG/DL (ref 68–126)
FASTING PATIENT LIPID ANSWER: YES
FASTING STATUS PATIENT QL REPORTED: YES
GLOBULIN PLAS-MCNC: 3.6 G/DL (ref 2.8–4.4)
GLUCOSE BLD-MCNC: 202 MG/DL (ref 70–99)
HBA1C MFR BLD: 9 % (ref ?–5.7)
HDLC SERPL-MCNC: 40 MG/DL (ref 40–59)
LDLC SERPL CALC-MCNC: 69 MG/DL (ref ?–100)
MICROALBUMIN UR-MCNC: <0.5 MG/DL
NONHDLC SERPL-MCNC: 94 MG/DL (ref ?–130)
OSMOLALITY SERPL CALC.SUM OF ELEC: 288 MOSM/KG (ref 275–295)
POTASSIUM SERPL-SCNC: 5.2 MMOL/L (ref 3.5–5.1)
PROT SERPL-MCNC: 7.6 G/DL (ref 6.4–8.2)
SODIUM SERPL-SCNC: 135 MMOL/L (ref 136–145)
TRIGL SERPL-MCNC: 140 MG/DL (ref 30–149)
VLDLC SERPL CALC-MCNC: 21 MG/DL (ref 0–30)

## 2024-04-09 PROCEDURE — 80061 LIPID PANEL: CPT

## 2024-04-09 PROCEDURE — 82043 UR ALBUMIN QUANTITATIVE: CPT

## 2024-04-09 PROCEDURE — 82570 ASSAY OF URINE CREATININE: CPT

## 2024-04-09 PROCEDURE — 36415 COLL VENOUS BLD VENIPUNCTURE: CPT

## 2024-04-09 PROCEDURE — 83036 HEMOGLOBIN GLYCOSYLATED A1C: CPT

## 2024-04-09 PROCEDURE — 80053 COMPREHEN METABOLIC PANEL: CPT

## 2024-04-11 ENCOUNTER — PROCEDURE (OUTPATIENT)
Dept: SURGERY | Facility: CLINIC | Age: 74
End: 2024-04-11
Payer: COMMERCIAL

## 2024-04-11 VITALS — DIASTOLIC BLOOD PRESSURE: 68 MMHG | SYSTOLIC BLOOD PRESSURE: 113 MMHG

## 2024-04-11 DIAGNOSIS — E11.9 TYPE 2 DIABETES MELLITUS WITHOUT COMPLICATION, WITHOUT LONG-TERM CURRENT USE OF INSULIN (HCC): ICD-10-CM

## 2024-04-11 DIAGNOSIS — R82.90 URINE FINDING: Primary | ICD-10-CM

## 2024-04-11 DIAGNOSIS — N13.8 BPH WITH OBSTRUCTION/LOWER URINARY TRACT SYMPTOMS: ICD-10-CM

## 2024-04-11 DIAGNOSIS — N40.1 BPH WITH OBSTRUCTION/LOWER URINARY TRACT SYMPTOMS: ICD-10-CM

## 2024-04-11 LAB
APPEARANCE: CLEAR
BILIRUBIN: NEGATIVE
GLUCOSE (URINE DIPSTICK): >=1000 MG/DL
LEUKOCYTES: NEGATIVE
MULTISTIX LOT#: ABNORMAL NUMERIC
NITRITE, URINE: NEGATIVE
OCCULT BLOOD: NEGATIVE
PH, URINE: 5.5 (ref 4.5–8)
PROTEIN (URINE DIPSTICK): NEGATIVE MG/DL
SPECIFIC GRAVITY: 1.01 (ref 1–1.03)
URINE-COLOR: YELLOW
UROBILINOGEN,SEMI-QN: 0.2 MG/DL (ref 0–1.9)

## 2024-04-11 PROCEDURE — 3078F DIAST BP <80 MM HG: CPT | Performed by: SURGERY

## 2024-04-11 PROCEDURE — 99213 OFFICE O/P EST LOW 20 MIN: CPT | Performed by: SURGERY

## 2024-04-11 PROCEDURE — 81003 URINALYSIS AUTO W/O SCOPE: CPT | Performed by: SURGERY

## 2024-04-11 PROCEDURE — 3074F SYST BP LT 130 MM HG: CPT | Performed by: SURGERY

## 2024-04-11 PROCEDURE — 52000 CYSTOURETHROSCOPY: CPT | Performed by: SURGERY

## 2024-04-11 RX ORDER — SULFAMETHOXAZOLE AND TRIMETHOPRIM 800; 160 MG/1; MG/1
1 TABLET ORAL ONCE
Status: COMPLETED | OUTPATIENT
Start: 2024-04-11 | End: 2024-04-11

## 2024-04-11 RX ADMIN — SULFAMETHOXAZOLE AND TRIMETHOPRIM 1 TABLET: 800; 160 TABLET ORAL at 11:30:00

## 2024-04-11 NOTE — PROCEDURES
Clinic Procedure Note    INDICATIONS:   Torres Darnell is a 73 year old male with history of OA, nephrolithiasis, CAD status post PCI, diabetes, hypertension, hyperlipidemia, nephrolithiasis, BPH/LUTS on tamsulosin 0.8 mg nightly previously followed by Dr. Davis last seen on 2022.  At that time he was instructed to decrease caffeinated beverages and continue tamsulosin.     CT from 2022 and that showed prostate volume 72 mL, several small bilateral nonobstructing renal stones largest 6 mm on the left, multiple small simple renal cysts bilaterally.     He endorses weak and intermittent urinary stream, urinary frequency, urinary urgency and occasional urge incontinence, nocturia 5 times per night.  He does not drink any caffeine or alcohol.  He denies gross hematuria.  He notes dry ejaculation, likely related to tamsulosin. Denies sleep apnea.     He also has erectile dysfunction and uses sildenafil.  They recently increased the dose to 100 mg but he has not tried this very much yet.    At his last visit with me on 2023 I continue tamsulosin and started finasteride and trospium for his voiding symptoms.  Repeat CT for stone surveillance on 2023 showed small nonobstructing bilateral renal stones, largest 3 mm on the left and 2 mm on the right.    PROCEDURE:       1. Flexible cystourethroscopy    DATE OF PROCEDURE: 2024     PRE-PROCEDURE DIAGNOSIS: LUTS    POST-PROCEDURE DIAGNOSIS: Same     SURGEON: Ruslan Montaño MD    FINDINGS:    Urethra: Orthotopic meatus, normal caliber urethra throughout without lesions    Prostate: Moderate trilobar hyperplasia with mildly obstructive appearance, medium high bladder neck/median lobe    Bladder: Normal mucosa with no papillary lesions or erythema, moderate trabeculation    Ureteral orifices: Orthotopic    Other findings: None    PROCEDURE:   Patient was brought to the procedure suite and a time-out was performed identifiying the patient,  and  procedure to be performed. The risks and benefits of the procedure were once again discussed with the patient including bleeding, infection, and dysuria. The patient agreed to proceed. The patient did not have any signs or symptoms of active UTI.    He was placed in supine position on the table and the penis was prepped and draped in the standard sterile fashion. Urojet was instilled per urethra for local anesthetic effect. A flexible cystoscope was inserted per urethra. The bladder was fully inspected (including retroflexion) and showed findings as above. Both ureteral orifices were orthotopic. The prostate was carefully viewed on removal of the scope, with findings as above. The scope was then carefully removed.    There were no complications and the patient tolerated the procedure well.    IMPRESSION:  Torres Darnell is a 73 year old male with history of OA, nephrolithiasis, CAD status post PCI, diabetes, hypertension, hyperlipidemia, nephrolithiasis, BPH/LUTS on tamsulosin 0.8 mg nightly previously followed by Dr. Davis last seen on 5/5/2022.  At that time he was instructed to decrease caffeinated beverages and continue tamsulosin.     CT from 6/2/2022 and that showed prostate volume 72 mL, several small bilateral nonobstructing renal stones largest 6 mm on the left, multiple small simple renal cysts bilaterally.     He endorses weak and intermittent urinary stream, urinary frequency, urinary urgency and occasional urge incontinence, nocturia 5 times per night.  He does not drink any caffeine or alcohol.  He denies gross hematuria.  He notes dry ejaculation, likely related to tamsulosin. Denies sleep apnea.     He also has erectile dysfunction and uses sildenafil.  They recently increased the dose to 100 mg but he has not tried this very much yet.    At his last visit with me on 11/2/2023 I continue tamsulosin and started finasteride and trospium for his voiding symptoms.  Repeat CT for stone  surveillance on 11/29/2023 showed small nonobstructing bilateral renal stones, largest 3 mm on the left and 2 mm on the right.    Cystoscopy today shows mildly trabeculated bladder, moderate trilobar hyperplasia and obstruction.  His obstructive symptoms are well-controlled on medications, and he is more bothered by his overactive symptoms.  I discussed starting mirabegron instead of trospium to see if this improves his symptoms.  I also discussed dual anticholinergic therapy if this does not work.    PLAN:   -Continue tamsulosin and finasteride, obstructive LUTS are minimally bothersome at this time  -Stop trospium, start mirabegron 50 mg daily since overactive LUTS are his more bothersome symptoms  -Return to clinic in 6 weeks for symptom check      Ruslan Montaño MD  Staff Urologist  [facility]  Office: [phone]

## 2024-04-11 NOTE — PROGRESS NOTES
Urology Clinic Note    Primary Care Provider:  Jose Marie MD     Chief Complaint:   Lower urinary tract symptoms    HPI:   Torres Darnell is a 73 year old male with history of OA, nephrolithiasis, CAD status post PCI, diabetes, hypertension, hyperlipidemia, nephrolithiasis, BPH/LUTS on tamsulosin 0.8 mg nightly previously followed by Dr. Davis last seen on 5/5/2022.  At that time he was instructed to decrease caffeinated beverages and continue tamsulosin.     CT from 6/2/2022 and that showed prostate volume 72 mL, several small bilateral nonobstructing renal stones largest 6 mm on the left, multiple small simple renal cysts bilaterally.     He endorses weak and intermittent urinary stream, urinary frequency, urinary urgency and occasional urge incontinence, nocturia 5 times per night.  He does not drink any caffeine or alcohol.  He denies gross hematuria.  He notes dry ejaculation, likely related to tamsulosin. Denies sleep apnea.     He also has erectile dysfunction and uses sildenafil.  They recently increased the dose to 100 mg but he has not tried this very much yet.    At his last visit with me on 11/2/2023 I continue tamsulosin and started finasteride and trospium for his voiding symptoms.  Repeat CT for stone surveillance on 11/29/2023 showed small nonobstructing bilateral renal stones, largest 3 mm on the left and 2 mm on the right.    Cystoscopy today shows mildly trabeculated bladder, moderate trilobar hyperplasia and obstruction.  His obstructive symptoms are well-controlled on medications, and he is more bothered by his overactive symptoms.  I discussed starting mirabegron instead of trospium to see if this improves his symptoms.  I also discussed dual anticholinergic therapy if this does not work.    PSA:  Lab Results   Component Value Date    PSA 0.66 03/28/2022    PSA 0.99 11/15/2019    PSAS 0.74 11/02/2021    QPSA 0.5 01/20/2012        History:     Past Medical History:    Arthritis     back    Back pain    Calculus of kidney    Coronary atherosclerosis    Diabetes (HCC)    Diabetes mellitus (HCC)    Dizziness    3 episodes of balance issues    Elevated blood pressure reading without diagnosis of hypertension    Encounter for screening fecal occult blood testing    Negative for Occult Blood    Essential hypertension    Frequent urination    Heart attack (HCC)    w/stent placememnt    High blood pressure    High cholesterol    Hyperlipidemia    Kidney stone    Pure hypercholesterolemia    Rash    occassioanal on shin    Sleep disturbance    Stented coronary artery    Visual impairment    contacts and glasses    Wears glasses    Weight loss    Quick 15 lb loss, reason?       Past Surgical History:   Procedure Laterality Date    Angioplasty (coronary)      2 stents    Cath bare metal stent (bms)      Cholecystectomy      1985    Egd N/A 9/30/2016    Procedure: ESOPHAGOGASTRODUODENOSCOPY (EGD);  Surgeon: Marimar Ziegler DO;  Location:  ENDOSCOPY    Neck/chest procedure unlisted  1998    ruptured vertebrae-cervical    Percutaneous  angioplasty  (ptca)- pbp only      Removal gallbladder      Spine surgery procedure unlisted  neck surgery in 2002       Family History   Problem Relation Age of Onset    Other (Acyte Appendicitis) Son     Hypertension Other         fam hx    Diabetes Mother     Diabetes Sister     Diabetes Sister     Hypertension Sister     Diabetes Brother     Hypertension Brother     Heart Attack Brother     Hypertension Sister     Hypertension Brother     Heart Attack Brother        Social History     Socioeconomic History    Marital status:    Tobacco Use    Smoking status: Never    Smokeless tobacco: Never   Vaping Use    Vaping status: Never Used   Substance and Sexual Activity    Alcohol use: Not Currently     Comment: Quit Jan 2022.  Used to have a manhattan a day.    Drug use: No    Sexual activity: Yes     Partners: Female   Other Topics Concern    Caffeine Concern No     Exercise Yes     Social Determinants of Health     Financial Resource Strain: Low Risk  (1/24/2022)    Received from [facility]    Overall Financial Resource Strain (CARDIA)     Difficulty of Paying Living Expenses: Not hard at all   Transportation Needs: No Transportation Needs (1/24/2022)    Received from [facility]    PRAPARE - Transportation     Lack of Transportation (Medical): No     Lack of Transportation (Non-Medical): No       Medications (Active prior to today's visit):  Current Outpatient Medications   Medication Sig Dispense Refill    atenolol 50 MG Oral Tab Take 1 tablet (50 mg total) by mouth daily. 90 tablet 0    Sildenafil Citrate 100 MG Oral Tab Take 1 tablet (100 mg total) by mouth as needed for Erectile Dysfunction. 30 tablet 0    atorvastatin 20 MG Oral Tab Take 1 tablet (20 mg total) by mouth nightly. 90 tablet 0    glipiZIDE ER 10 MG Oral Tablet 24 Hr Take 1 tablet (10 mg total) by mouth 2 (two) times daily with meals. 90 tablet 0    metFORMIN  MG Oral Tablet 24 Hr Take 2 tablets (1,000 mg total) by mouth 2 (two) times daily. 360 tablet 0    Empagliflozin (JARDIANCE) 25 MG Oral Tab Take 1 tablet by mouth daily. 90 tablet 1    finasteride 5 MG Oral Tab Take 1 tablet (5 mg total) by mouth daily. 90 tablet 6    Trospium Chloride ER 60 MG Oral Capsule SR 24 Hr Take 1 capsule (60 mg total) by mouth daily. 90 capsule 5    tamsulosin 0.4 MG Oral Cap Take 2 capsules (0.8 mg total) by mouth daily. 180 capsule 5    atorvastatin 20 MG Oral Tab Take 1 tablet (20 mg total) by mouth nightly. 90 tablet 0    pantoprazole 40 MG Oral Tab EC Take one tablet (40 mg total) by mouth twice daily, 30 minutes prior to breakfast and 30 minutes prior to dinner. 180 tablet 3    Cyanocobalamin (VITAMIN B-12) 2500 MCG Sublingual SL Tab Place 1,500 mcg under the tongue.  0    AMLODIPINE 2.5 MG Oral Tab Take 1 tablet by mouth once daily 90 tablet 0    BENFOTIAMINE OR Take by mouth.      Coenzyme Q10  (COQ-10 OR) Take by mouth.      aspirin 81 MG Oral Tab EC Take 1 tablet (81 mg total) by mouth daily.  0    losartan 100 MG Oral Tab Take 1 tablet (100 mg total) by mouth daily.      Multiple Vitamin (TAB-A-VANITA) Oral Tab Take 1 tablet by mouth daily.         Allergies:  No Known Allergies    Review of Systems:   A comprehensive 10-point review of systems was completed.  Pertinent positives and negatives are noted in the the HPI.    Physical Exam:   CONSTITUTIONAL: Well developed, well nourished, in no acute distress  NEUROLOGIC: Alert and oriented  HEAD: Normocephalic, atraumatic  EYES: Sclera non-icteric  ENT: Hearing intact, moist mucous membranes  NECK: No obvious goiter or masses  RESPIRATORY: Normal respiratory effort  SKIN: No evident rashes  ABDOMEN: Soft, non-tender, non-distended    Assessment & Plan:   Torres Darnell is a 73 year old male with history of OA, nephrolithiasis, CAD status post PCI, diabetes, hypertension, hyperlipidemia, nephrolithiasis, BPH/LUTS on tamsulosin 0.8 mg nightly previously followed by Dr. Davis last seen on 5/5/2022.  At that time he was instructed to decrease caffeinated beverages and continue tamsulosin.     CT from 6/2/2022 and that showed prostate volume 72 mL, several small bilateral nonobstructing renal stones largest 6 mm on the left, multiple small simple renal cysts bilaterally.     He endorses weak and intermittent urinary stream, urinary frequency, urinary urgency and occasional urge incontinence, nocturia 5 times per night.  He does not drink any caffeine or alcohol.  He denies gross hematuria.  He notes dry ejaculation, likely related to tamsulosin. Denies sleep apnea.     He also has erectile dysfunction and uses sildenafil.  They recently increased the dose to 100 mg but he has not tried this very much yet.    At his last visit with me on 11/2/2023 I continue tamsulosin and started finasteride and trospium for his voiding symptoms.  Repeat CT for stone  surveillance on 11/29/2023 showed small nonobstructing bilateral renal stones, largest 3 mm on the left and 2 mm on the right.    Cystoscopy today shows mildly trabeculated bladder, moderate trilobar hyperplasia and obstruction.  His obstructive symptoms are well-controlled on medications, and he is more bothered by his overactive symptoms.  I discussed starting mirabegron instead of trospium to see if this improves his symptoms.  I also discussed dual anticholinergic therapy if this does not work.    PLAN:   -Continue tamsulosin and finasteride, obstructive LUTS are minimally bothersome at this time  -Stop trospium, start mirabegron 50 mg daily since overactive LUTS are his more bothersome symptoms  -Return to clinic in 6 weeks for symptom check    In total, 20 minutes were spent on this patient encounter (including chart review, patient history, physical, and counseling, documentation, and communication).    Ruslan Montaño MD  Staff Urologist  [facility]  Office: [phone]

## 2024-04-12 RX ORDER — METFORMIN HYDROCHLORIDE 500 MG/1
1000 TABLET, EXTENDED RELEASE ORAL 2 TIMES DAILY
Qty: 360 TABLET | Refills: 0 | Status: SHIPPED | OUTPATIENT
Start: 2024-04-12

## 2024-04-12 RX ORDER — EMPAGLIFLOZIN 25 MG/1
1 TABLET, FILM COATED ORAL DAILY
Qty: 90 TABLET | Refills: 1 | Status: SHIPPED | OUTPATIENT
Start: 2024-04-12

## 2024-04-12 NOTE — TELEPHONE ENCOUNTER
JARDIANCE  Last time medication was refilled 11/8/23  Last OV 1/25/24  Next OV due/scheduled 4/25/24  Failed protocol.   METFORMIN   Last time medication was refilled 1/16/24  Last OV 1/25/24  Next OV due/scheduled 4/25/24  Failed protocol.   ROUTED TO

## 2024-04-23 ENCOUNTER — LAB ENCOUNTER (OUTPATIENT)
Dept: LAB | Facility: HOSPITAL | Age: 74
End: 2024-04-23
Attending: FAMILY MEDICINE
Payer: COMMERCIAL

## 2024-04-23 DIAGNOSIS — E87.5 SERUM POTASSIUM ELEVATED: ICD-10-CM

## 2024-04-23 LAB
ANION GAP SERPL CALC-SCNC: 8 MMOL/L (ref 0–18)
BUN BLD-MCNC: 15 MG/DL (ref 9–23)
CALCIUM BLD-MCNC: 9.1 MG/DL (ref 8.5–10.1)
CHLORIDE SERPL-SCNC: 109 MMOL/L (ref 98–112)
CO2 SERPL-SCNC: 22 MMOL/L (ref 21–32)
CREAT BLD-MCNC: 1.12 MG/DL
EGFRCR SERPLBLD CKD-EPI 2021: 70 ML/MIN/1.73M2 (ref 60–?)
FASTING STATUS PATIENT QL REPORTED: NO
GLUCOSE BLD-MCNC: 330 MG/DL (ref 70–99)
OSMOLALITY SERPL CALC.SUM OF ELEC: 302 MOSM/KG (ref 275–295)
POTASSIUM SERPL-SCNC: 4.7 MMOL/L (ref 3.5–5.1)
SODIUM SERPL-SCNC: 139 MMOL/L (ref 136–145)

## 2024-04-23 PROCEDURE — 80048 BASIC METABOLIC PNL TOTAL CA: CPT

## 2024-04-23 PROCEDURE — 36415 COLL VENOUS BLD VENIPUNCTURE: CPT

## 2024-04-25 ENCOUNTER — OFFICE VISIT (OUTPATIENT)
Dept: INTERNAL MEDICINE CLINIC | Facility: CLINIC | Age: 74
End: 2024-04-25
Payer: COMMERCIAL

## 2024-04-25 ENCOUNTER — PATIENT MESSAGE (OUTPATIENT)
Dept: SURGERY | Facility: CLINIC | Age: 74
End: 2024-04-25

## 2024-04-25 VITALS
OXYGEN SATURATION: 98 % | SYSTOLIC BLOOD PRESSURE: 108 MMHG | TEMPERATURE: 98 F | DIASTOLIC BLOOD PRESSURE: 62 MMHG | WEIGHT: 179.5 LBS | HEART RATE: 63 BPM | BODY MASS INDEX: 22 KG/M2 | RESPIRATION RATE: 20 BRPM

## 2024-04-25 DIAGNOSIS — I10 ESSENTIAL HYPERTENSION WITH GOAL BLOOD PRESSURE LESS THAN 130/80: ICD-10-CM

## 2024-04-25 DIAGNOSIS — E78.5 DYSLIPIDEMIA: ICD-10-CM

## 2024-04-25 DIAGNOSIS — E11.40 TYPE 2 DIABETES MELLITUS WITH DIABETIC NEUROPATHY, WITHOUT LONG-TERM CURRENT USE OF INSULIN (HCC): Primary | ICD-10-CM

## 2024-04-25 PROCEDURE — 3078F DIAST BP <80 MM HG: CPT | Performed by: FAMILY MEDICINE

## 2024-04-25 PROCEDURE — 3061F NEG MICROALBUMINURIA REV: CPT | Performed by: FAMILY MEDICINE

## 2024-04-25 PROCEDURE — 99214 OFFICE O/P EST MOD 30 MIN: CPT | Performed by: FAMILY MEDICINE

## 2024-04-25 PROCEDURE — 3052F HG A1C>EQUAL 8.0%<EQUAL 9.0%: CPT | Performed by: FAMILY MEDICINE

## 2024-04-25 PROCEDURE — 3074F SYST BP LT 130 MM HG: CPT | Performed by: FAMILY MEDICINE

## 2024-04-25 RX ORDER — SITAGLIPTIN AND METFORMIN HYDROCHLORIDE 1000; 50 MG/1; MG/1
1 TABLET, FILM COATED ORAL 2 TIMES DAILY WITH MEALS
Qty: 180 TABLET | Refills: 0 | Status: SHIPPED | OUTPATIENT
Start: 2024-04-25 | End: 2024-07-24

## 2024-04-25 NOTE — PROGRESS NOTES
Torres Darnell  6/13/1950    Chief Complaint   Patient presents with    Diabetes    Follow - Up       HPI:   Torres Darnell is a 73 year old male who presents for follow-up. Has been consistent with his medications, but only using glipizide once daily. He is trying to maintain a healthy diet. Planning on retiring in the next year.     Current Outpatient Medications   Medication Sig Dispense Refill    SITagliptin-metFORMIN HCl (JANUMET)  MG Oral Tab Take 1 tablet by mouth 2 (two) times daily with meals. 180 tablet 0    Empagliflozin (JARDIANCE) 25 MG Oral Tab Take 1 tablet by mouth daily. 90 tablet 1    atenolol 50 MG Oral Tab Take 1 tablet (50 mg total) by mouth daily. 90 tablet 0    Sildenafil Citrate 100 MG Oral Tab Take 1 tablet (100 mg total) by mouth as needed for Erectile Dysfunction. 30 tablet 0    glipiZIDE ER 10 MG Oral Tablet 24 Hr Take 1 tablet (10 mg total) by mouth 2 (two) times daily with meals. 90 tablet 0    finasteride 5 MG Oral Tab Take 1 tablet (5 mg total) by mouth daily. 90 tablet 6    Trospium Chloride ER 60 MG Oral Capsule SR 24 Hr Take 1 capsule (60 mg total) by mouth daily. 90 capsule 5    tamsulosin 0.4 MG Oral Cap Take 2 capsules (0.8 mg total) by mouth daily. 180 capsule 5    atorvastatin 20 MG Oral Tab Take 1 tablet (20 mg total) by mouth nightly. 90 tablet 0    pantoprazole 40 MG Oral Tab EC Take one tablet (40 mg total) by mouth twice daily, 30 minutes prior to breakfast and 30 minutes prior to dinner. 180 tablet 3    Cyanocobalamin (VITAMIN B-12) 2500 MCG Sublingual SL Tab Place 1,500 mcg under the tongue.  0    AMLODIPINE 2.5 MG Oral Tab Take 1 tablet by mouth once daily 90 tablet 0    BENFOTIAMINE OR Take by mouth.      Coenzyme Q10 (COQ-10 OR) Take by mouth.      aspirin 81 MG Oral Tab EC Take 1 tablet (81 mg total) by mouth daily.  0    losartan 100 MG Oral Tab Take 1 tablet (100 mg total) by mouth daily.      Multiple Vitamin (TAB-A-VANITA) Oral Tab Take 1 tablet  by mouth daily.        No Known Allergies   Past Medical History:    Arthritis    back    Back pain    Calculus of kidney    Coronary atherosclerosis    Diabetes (HCC)    Diabetes mellitus (HCC)    Dizziness    3 episodes of balance issues    Elevated blood pressure reading without diagnosis of hypertension    Encounter for screening fecal occult blood testing    Negative for Occult Blood    Essential hypertension    Frequent urination    Heart attack (HCC)    w/stent placememnt    High blood pressure    High cholesterol    Hyperlipidemia    Kidney stone    Pure hypercholesterolemia    Rash    occassioanal on shin    Sleep disturbance    Stented coronary artery    Visual impairment    contacts and glasses    Wears glasses    Weight loss    Quick 15 lb loss, reason?      Patient Active Problem List   Diagnosis    Pure hypercholesterolemia    Tremor of both hands    Gait abnormality    Poor short term memory    Coronary artery disease involving native heart without angina pectoris    Dyslipidemia    Essential hypertension with goal blood pressure less than 130/80    Thrombocytopenia (HCC)    Duodenal ulcer    Left shoulder pain    Gastroesophageal reflux disease without esophagitis    Chronic pancreatitis (HCC)    Sensory motor neuropathy    Type 2 diabetes mellitus with diabetic neuropathy (HCC)    Acute pancreatitis (HCC)    Pancreatic mass (HCC)    Back pain of thoracolumbar region    Lumbar disc lesion    Angeles's esophagus without dysplasia    Hiatal hernia      Past Surgical History:   Procedure Laterality Date    Angioplasty (coronary)      2 stents    Cath bare metal stent (bms)      Cholecystectomy      1985    Egd N/A 9/30/2016    Procedure: ESOPHAGOGASTRODUODENOSCOPY (EGD);  Surgeon: Marimar Ziegler DO;  Location:  ENDOSCOPY    Neck/chest procedure unlisted  1998    ruptured vertebrae-cervical    Percutaneous  angioplasty  (ptca)- pbp only      Removal gallbladder      Spine surgery procedure unlisted   neck surgery in 2002      Family History   Problem Relation Age of Onset    Other (Acyte Appendicitis) Son     Hypertension Other         fam hx    Diabetes Mother     Diabetes Sister     Diabetes Sister     Hypertension Sister     Diabetes Brother     Hypertension Brother     Heart Attack Brother     Hypertension Sister     Hypertension Brother     Heart Attack Brother       Social History     Socioeconomic History    Marital status:    Tobacco Use    Smoking status: Never    Smokeless tobacco: Never   Vaping Use    Vaping status: Never Used   Substance and Sexual Activity    Alcohol use: Not Currently     Comment: Quit Jan 2022.  Used to have a manhattan a day.    Drug use: No    Sexual activity: Yes     Partners: Female   Other Topics Concern    Caffeine Concern No    Exercise Yes     Social Determinants of Health     Financial Resource Strain: Low Risk  (1/24/2022)    Received from [facility]    Overall Financial Resource Strain (CARDIA)     Difficulty of Paying Living Expenses: Not hard at all   Transportation Needs: No Transportation Needs (1/24/2022)    Received from [facility]    PRAPARE - Transportation     Lack of Transportation (Medical): No     Lack of Transportation (Non-Medical): No         REVIEW OF SYSTEMS:   GENERAL: feels well otherwise    EXAM:   /62   Pulse 63   Temp 97.9 °F (36.6 °C)   Resp 20   Wt 179 lb 8 oz (81.4 kg)   SpO2 98%   BMI 22.44 kg/m²   GENERAL: Well developed, well nourished,in no apparent distress  SKIN: No rash  LUNGS: clear to auscultation  CARDIO: RRR without murmur    ASSESSMENT AND PLAN:   Torres Darnell is a 73 year old male who presents for follow-up    Type 2 diabetes mellitus with diabetic neuropathy  Uncontrolled. Will increase his glipizide to BID and add Januvia in the form of Janumet. Follow-up in 3 months with A1c in office.   - SITagliptin-metFORMIN HCl (JANUMET)  MG Oral Tab; Take 1 tablet by mouth 2 (two) times  daily with meals.  Dispense: 180 tablet; Refill: 0    HLD  Continue statin, LDL at goal    HTN  Controlled    All questions were answered and the patient agrees with the plan.     Thank you,  Jose Marie MD

## 2024-04-26 ENCOUNTER — TELEPHONE (OUTPATIENT)
Dept: SURGERY | Facility: CLINIC | Age: 74
End: 2024-04-26

## 2024-04-26 RX ORDER — ATORVASTATIN CALCIUM 20 MG/1
20 TABLET, FILM COATED ORAL NIGHTLY
Qty: 90 TABLET | Refills: 0 | Status: SHIPPED | OUTPATIENT
Start: 2024-04-26

## 2024-04-26 NOTE — TELEPHONE ENCOUNTER
This encounter is now closed.     RN received a note from [facility], \"Myrbetriq 50 mg does not require PA\"

## 2024-04-26 NOTE — TELEPHONE ENCOUNTER
This encounter is now closed.     RN placed Myrbetriq 50mg  Patient made aware. See Sabrina GUERIN initiated today and sent to [facility].   See office notes on 4/11/24 by Dr Montaño:       PLAN:   -Continue tamsulosin and finasteride, obstructive LUTS are minimally bothersome at this time  -Stop trospium, start mirabegron 50 mg daily since overactive LUTS are his more bothersome symptoms  -Return to clinic in 6 weeks for symptom check

## 2024-04-26 NOTE — TELEPHONE ENCOUNTER
Atorvastatin      Last OV: 4/25/24  Last refill date: 2/1/24 #/refills: 90/0  Upcoming appt:   Future Appointments   Date Time Provider Department Center   7/18/2024 11:30 AM Jose Marie MD [id number]

## 2024-04-29 ENCOUNTER — TELEPHONE (OUTPATIENT)
Dept: INTERNAL MEDICINE CLINIC | Facility: CLINIC | Age: 74
End: 2024-04-29

## 2024-04-29 NOTE — TELEPHONE ENCOUNTER
Pt asking about taking glipizide BID instead of janumet due to cost. Per MK LOV note from 4/25, glipizide was increased to BID and janumet was added.     Called and spoke to pt. Pt asking if PRANAY is ok with him going back on glipizide BID, stated he was on this 6-8 mos ago and his A1c went down. Pharmacy did not have any Janumet in stock, so they had to order it, but will be $70.  Informed pt per OV note, he was to be taking glipizide BID and also take Janumet in addition to. Pt denied this, stated glipizide was discontinued at OV and he was to only be on Janument. Informed pt will ask MK if ok to go back on glipizide BID and hold on Janumet.       MK, pt wanting to take just glipizide BID due to cost and rpt labs in a couple months to check progress. Are you ok with this?

## 2024-04-29 NOTE — TELEPHONE ENCOUNTER
Patient calling to provide a condition update after going on a new medication, per Dr. Marie. Patient states that the medication is expensive and patient is thinking it would be better to take the glipizide 2x/day instead of once daily since this made his A1C better in the past.   Janumet  MG Tabs is the medication that was just started that Dr. Marie wanted an update on.

## 2024-04-30 NOTE — TELEPHONE ENCOUNTER
Called and spoke w/ pt. Notified MK stated okay to take Glipizide BID and we will recheck A1C in the office when he's here for visit in July. Pt stated that sounds perfect. Asking about Metformin dose. Notified there were no comments from MK regarding changes with Metformin. Pt verbalizes understanding and is agreeable to plan.

## 2024-05-04 DIAGNOSIS — E11.40 TYPE 2 DIABETES MELLITUS WITH DIABETIC NEUROPATHY, WITHOUT LONG-TERM CURRENT USE OF INSULIN (HCC): ICD-10-CM

## 2024-05-06 RX ORDER — GLIPIZIDE 10 MG/1
10 TABLET, FILM COATED, EXTENDED RELEASE ORAL 2 TIMES DAILY WITH MEALS
Qty: 180 TABLET | Refills: 3 | Status: SHIPPED | OUTPATIENT
Start: 2024-05-06

## 2024-05-06 NOTE — TELEPHONE ENCOUNTER
Please review; protocol failed/ has no protocol    Requested Prescriptions   Pending Prescriptions Disp Refills    glipiZIDE ER 10 MG Oral Tablet 24 Hr 90 tablet 0     Sig: Take 1 tablet (10 mg total) by mouth 2 (two) times daily with meals.       Diabetes Medication Protocol Failed - 5/6/2024  4:22 PM        Failed - Last A1C < 7.5 and within past 6 months     Lab Results   Component Value Date    A1C 9.0 (H) 04/09/2024             Passed - In person appointment or virtual visit in the past 6 mos or appointment in next 3 mos     Recent Outpatient Visits              1 week ago Type 2 diabetes mellitus with diabetic neuropathy, without long-term current use of insulin ([facility])    [address]    Office Visit    3 months ago Type 2 diabetes mellitus with diabetic neuropathy, without long-term current use of insulin ([facility])    [address]    Office Visit    6 months ago Erectile dysfunction of organic origin    [facility] Ruslan Montaño MD    Office Visit    8 months ago Type 2 diabetes mellitus with diabetic neuropathy, without long-term current use of insulin ([facility])    [address]    Office Visit    10 months ago Angeles's esophagus determined by biopsy    [facility] Wiliam Singh MD    Office Visit          Future Appointments         Provider Department Appt Notes    In 3 weeks Ruslan Montaño MD [facility] follow up - frequent urination  stopped Tropsium Chl 60 mg  & tried  Myrbetriq 50 mg    In 2 months Jose Marie MD [address] last was on 6/14/23                    Passed - Microalbumin procedure in past 12 months or taking ACE/ARB        Passed - EGFRCR or GFRNAA > 50      GFR Evaluation  EGFRCR: 70 , resulted on 4/23/2024          Passed - GFR in the past 12 months           Recent Outpatient Visits              1 week ago Type 2 diabetes mellitus with diabetic neuropathy, without long-term current use of insulin ([facility])    [address]    Office Visit    3 months ago Type 2 diabetes mellitus with diabetic neuropathy, without long-term current use of insulin ([facility])    [address]    Office Visit    6 months ago Erectile dysfunction of organic origin    [facility] Ruslan Montaño MD    Office Visit    8 months ago Type 2 diabetes mellitus with diabetic neuropathy, without long-term current use of insulin ([facility])    [address]    Office Visit    10 months ago Angeles's esophagus determined by biopsy    [facility]  [facility] Wiliam Singh MD    Office Visit          Future Appointments         Provider Department Appt Notes    In 3 weeks Ruslan Montaño MD [facility] follow up - frequent urination  stopped Tropsium Chl 60 mg  & tried  Myrbetriq 50 mg    In 2 months Jose Marie MD [address] last was on 6/14/23

## 2024-05-30 ENCOUNTER — OFFICE VISIT (OUTPATIENT)
Dept: SURGERY | Facility: CLINIC | Age: 74
End: 2024-05-30

## 2024-05-30 DIAGNOSIS — N40.1 BPH WITH OBSTRUCTION/LOWER URINARY TRACT SYMPTOMS: Primary | ICD-10-CM

## 2024-05-30 DIAGNOSIS — N13.8 BPH WITH OBSTRUCTION/LOWER URINARY TRACT SYMPTOMS: Primary | ICD-10-CM

## 2024-05-30 DIAGNOSIS — N32.81 OAB (OVERACTIVE BLADDER): ICD-10-CM

## 2024-05-30 PROCEDURE — 99213 OFFICE O/P EST LOW 20 MIN: CPT | Performed by: SURGERY

## 2024-05-30 RX ORDER — TROSPIUM CHLORIDE ER 60 MG/1
60 CAPSULE ORAL DAILY
COMMUNITY
Start: 2024-04-29

## 2024-05-30 NOTE — PROGRESS NOTES
Urology Clinic Note    Primary Care Provider:  Jose Marie MD     Chief Complaint:   BPH/LUTS, OAB    HPI:   Torres Darnell is a 73 year old male with history of OA, nephrolithiasis, CAD status post PCI, diabetes, hypertension, hyperlipidemia, nephrolithiasis, BPH/LUTS on tamsulosin 0.8 mg.       CT from 6/2/2022 and that showed prostate volume 72 mL, several small bilateral nonobstructing renal stones largest 6 mm on the left, multiple small simple renal cysts bilaterally.     He endorses weak and intermittent urinary stream, urinary frequency, urinary urgency and occasional urge incontinence, nocturia 5 times per night.  He does not drink any caffeine or alcohol.  He denies gross hematuria.  He notes dry ejaculation, likely related to tamsulosin. Denies sleep apnea.     He also has erectile dysfunction and uses sildenafil.  They recently increased the dose to 100 mg but he has not tried this very much yet.     At his last visit with me on 11/2/2023 I continued tamsulosin and started finasteride and trospium for his voiding symptoms.  Repeat CT for stone surveillance on 11/29/2023 showed small nonobstructing bilateral renal stones, largest 3 mm on the left and 2 mm on the right.     Cystoscopy 4/11/24 showed mildly trabeculated bladder, moderate trilobar hyperplasia and obstruction.  His obstructive symptoms are well-controlled on tamsulosin and finasteride, and he is more bothered by his overactive symptoms.  I discussed starting mirabegron instead of trospium to see if this improves his symptoms.  I also discussed dual anticholinergic therapy if this does not work.    He does feel slight improvement since starting mirabegron.  He continues to have nocturia 2-4 times per night.  He has daytime urgency and frequency but cannot hold his urine for 2 to 3 hours at a time.  He does not drink caffeine or alcohol.  At the moment he is satisfied with his voiding symptoms and would like to continue his current  medication regimen.    AUA symptom score is 24/1 with LUTS.    Post-void residual bladder scan: 191 mL (voided 1 hour prior)    Urinalysis: None provided    PSA:  Lab Results   Component Value Date    PSA 0.66 03/28/2022    PSA 0.99 11/15/2019    PSAS 0.74 11/02/2021    QPSA 0.5 01/20/2012        History:     Past Medical History:    Arthritis    back    Back pain    Calculus of kidney    Coronary atherosclerosis    Diabetes (HCC)    Diabetes mellitus (HCC)    Dizziness    3 episodes of balance issues    Elevated blood pressure reading without diagnosis of hypertension    Encounter for screening fecal occult blood testing    Negative for Occult Blood    Essential hypertension    Frequent urination    Heart attack (HCC)    w/stent placememnt    High blood pressure    High cholesterol    Hyperlipidemia    Kidney stone    Pure hypercholesterolemia    Rash    occassioanal on shin    Sleep disturbance    Stented coronary artery    Visual impairment    contacts and glasses    Wears glasses    Weight loss    Quick 15 lb loss, reason?       Past Surgical History:   Procedure Laterality Date    Angioplasty (coronary)      2 stents    Cath bare metal stent (bms)      Cholecystectomy      1985    Egd N/A 9/30/2016    Procedure: ESOPHAGOGASTRODUODENOSCOPY (EGD);  Surgeon: Marimar Ziegler DO;  Location:  ENDOSCOPY    Neck/chest procedure unlisted  1998    ruptured vertebrae-cervical    Percutaneous  angioplasty  (ptca)- pbp only      Removal gallbladder      Spine surgery procedure unlisted  neck surgery in 2002       Family History   Problem Relation Age of Onset    Other (Acyte Appendicitis) Son     Hypertension Other         fam hx    Diabetes Mother     Diabetes Sister     Diabetes Sister     Hypertension Sister     Diabetes Brother     Hypertension Brother     Heart Attack Brother     Hypertension Sister     Hypertension Brother     Heart Attack Brother        Social History     Socioeconomic History    Marital status:     Tobacco Use    Smoking status: Never    Smokeless tobacco: Never   Vaping Use    Vaping status: Never Used   Substance and Sexual Activity    Alcohol use: Not Currently     Comment: Quit Jan 2022.  Used to have a manhattan a day.    Drug use: No    Sexual activity: Yes     Partners: Female   Other Topics Concern    Caffeine Concern No    Exercise Yes     Social Determinants of Health     Financial Resource Strain: Low Risk  (1/24/2022)    [facility]    Overall Financial Resource Strain (CARDIA)     Difficulty of Paying Living Expenses: Not hard at all   Transportation Needs: No Transportation Needs (1/24/2022)    Received from [facility], [facility]    PRAPARE - Transportation     Lack of Transportation (Medical): No     Lack of Transportation (Non-Medical): No       Medications (Active prior to today's visit):  Current Outpatient Medications   Medication Sig Dispense Refill    Trospium Chloride ER 60 MG Oral Capsule SR 24 Hr Take 1 capsule (60 mg total) by mouth daily.      glipiZIDE ER 10 MG Oral Tablet 24 Hr Take 1 tablet (10 mg total) by mouth 2 (two) times daily with meals. 180 tablet 3    atorvastatin 20 MG Oral Tab Take 1 tablet (20 mg total) by mouth nightly. 90 tablet 0    Mirabegron ER 50 MG Oral Tablet 24 Hr Take 1 tablet (50 mg total) by mouth daily. 90 tablet 3    SITagliptin-metFORMIN HCl (JANUMET)  MG Oral Tab Take 1 tablet by mouth 2 (two) times daily with meals. 180 tablet 0    Empagliflozin (JARDIANCE) 25 MG Oral Tab Take 1 tablet by mouth daily. 90 tablet 1    atenolol 50 MG Oral Tab Take 1 tablet (50 mg total) by mouth daily. 90 tablet 0    Sildenafil Citrate 100 MG Oral Tab Take 1 tablet (100 mg total) by mouth as needed for Erectile Dysfunction. 30 tablet 0    finasteride 5 MG Oral Tab Take 1 tablet (5 mg total) by mouth daily. 90 tablet 6    tamsulosin 0.4 MG Oral Cap Take 2 capsules (0.8 mg total) by mouth  daily. 180 capsule 5    pantoprazole 40 MG Oral Tab EC Take one tablet (40 mg total) by mouth twice daily, 30 minutes prior to breakfast and 30 minutes prior to dinner. 180 tablet 3    Cyanocobalamin (VITAMIN B-12) 2500 MCG Sublingual SL Tab Place 1,500 mcg under the tongue.  0    AMLODIPINE 2.5 MG Oral Tab Take 1 tablet by mouth once daily 90 tablet 0    BENFOTIAMINE OR Take by mouth.      Coenzyme Q10 (COQ-10 OR) Take by mouth.      aspirin 81 MG Oral Tab EC Take 1 tablet (81 mg total) by mouth daily.  0    losartan 100 MG Oral Tab Take 1 tablet (100 mg total) by mouth daily.      Multiple Vitamin (TAB-A-VANITA) Oral Tab Take 1 tablet by mouth daily.         Allergies:  No Known Allergies    Review of Systems:   A comprehensive 10-point review of systems was completed.  Pertinent positives and negatives are noted in the the HPI.    Physical Exam:   CONSTITUTIONAL: Well developed, well nourished, in no acute distress  NEUROLOGIC: Alert and oriented  HEAD: Normocephalic, atraumatic  EYES: Sclera non-icteric  ENT: Hearing intact, moist mucous membranes  NECK: No obvious goiter or masses  RESPIRATORY: Normal respiratory effort  SKIN: No evident rashes  ABDOMEN: Soft, non-tender, non-distended    Assessment & Plan:   Torres Darnell is a 73 year old male with history of OA, nephrolithiasis, CAD status post PCI, diabetes, hypertension, hyperlipidemia, nephrolithiasis, BPH/LUTS on tamsulosin 0.8 mg.       CT from 6/2/2022 and that showed prostate volume 72 mL, several small bilateral nonobstructing renal stones largest 6 mm on the left, multiple small simple renal cysts bilaterally.     He endorses weak and intermittent urinary stream, urinary frequency, urinary urgency and occasional urge incontinence, nocturia 5 times per night.  He does not drink any caffeine or alcohol.  He denies gross hematuria.  He notes dry ejaculation, likely related to tamsulosin. Denies sleep apnea.     He also has erectile dysfunction and  uses sildenafil.  They recently increased the dose to 100 mg but he has not tried this very much yet.     At his last visit with me on 11/2/2023 I continued tamsulosin and started finasteride and trospium for his voiding symptoms.  Repeat CT for stone surveillance on 11/29/2023 showed small nonobstructing bilateral renal stones, largest 3 mm on the left and 2 mm on the right.     Cystoscopy 4/11/24 showed mildly trabeculated bladder, moderate trilobar hyperplasia and obstruction.  His obstructive symptoms are well-controlled on tamsulosin and finasteride, and he is more bothered by his overactive symptoms.  I discussed starting mirabegron instead of trospium to see if this improves his symptoms.  I also discussed dual anticholinergic therapy if this does not work.    He does feel slight improvement since starting mirabegron.  He continues to have nocturia 2-4 times per night.  He has daytime urgency and frequency but cannot hold his urine for 2 to 3 hours at a time.  He does not drink caffeine or alcohol.  At the moment he is satisfied with his voiding symptoms and would like to continue his current medication regimen.    -Continue tamsulosin, finasteride, mirabegron  -If symptoms worsen in the future can consider adding trospium for dual anticholinergic therapy    In total, 20 minutes were spent on this patient encounter (including chart review, patient history, physical, and counseling, documentation, and communication).    Ruslan Montaño MD  Staff Urologist  [facility]  Office: [phone]

## 2024-07-02 ENCOUNTER — TELEPHONE (OUTPATIENT)
Dept: INTERNAL MEDICINE CLINIC | Facility: CLINIC | Age: 74
End: 2024-07-02

## 2024-07-02 DIAGNOSIS — Z00.00 ROUTINE GENERAL MEDICAL EXAMINATION AT A HEALTH CARE FACILITY: ICD-10-CM

## 2024-07-02 DIAGNOSIS — E11.40 TYPE 2 DIABETES MELLITUS WITH DIABETIC NEUROPATHY, WITHOUT LONG-TERM CURRENT USE OF INSULIN (HCC): ICD-10-CM

## 2024-07-02 DIAGNOSIS — Z12.5 SCREENING FOR MALIGNANT NEOPLASM OF PROSTATE: ICD-10-CM

## 2024-07-02 DIAGNOSIS — Z13.29 SCREENING FOR THYROID DISORDER: ICD-10-CM

## 2024-07-02 DIAGNOSIS — Z13.0 SCREENING FOR BLOOD DISEASE: Primary | ICD-10-CM

## 2024-07-02 NOTE — TELEPHONE ENCOUNTER
CMP, Lipid, Urine Microalb, and HGA1C completed on 04/09/2024.  BMP completed on 04/23/2024.    CBC, TSH w Reflex, and PSA pended.

## 2024-07-08 RX ORDER — ATENOLOL 50 MG/1
50 TABLET ORAL DAILY
Qty: 90 TABLET | Refills: 0 | Status: SHIPPED | OUTPATIENT
Start: 2024-07-08

## 2024-07-08 NOTE — TELEPHONE ENCOUNTER
Atenolol 50 mg   Last OV: 04/25/24  Last refill date: 03/28/24     #/refills: 90/0  Upcoming appt:   Future Appointments   Date Time Provider Department Center   7/18/2024 11:30 AM Jose Marie MD [facility] [facility]   5/29/2025 11:15 AM Ruslan Montaño MD [id number]

## 2024-07-11 NOTE — PROGRESS NOTES
Patient is alert and oriented x4. Administered medications per MAR. Patient requested tylenol for pain. Adequate for discharge. Patient received discharge instructions and papers and was compliant. No indicators present

## 2024-07-18 ENCOUNTER — OFFICE VISIT (OUTPATIENT)
Dept: INTERNAL MEDICINE CLINIC | Facility: CLINIC | Age: 74
End: 2024-07-18
Payer: COMMERCIAL

## 2024-07-18 VITALS
BODY MASS INDEX: 22.26 KG/M2 | HEART RATE: 58 BPM | DIASTOLIC BLOOD PRESSURE: 56 MMHG | OXYGEN SATURATION: 98 % | HEIGHT: 75 IN | WEIGHT: 179 LBS | SYSTOLIC BLOOD PRESSURE: 102 MMHG

## 2024-07-18 DIAGNOSIS — Z12.11 SCREENING FOR COLON CANCER: ICD-10-CM

## 2024-07-18 DIAGNOSIS — D69.6 THROMBOCYTOPENIA (HCC): ICD-10-CM

## 2024-07-18 DIAGNOSIS — Z00.00 WELLNESS EXAMINATION: Primary | ICD-10-CM

## 2024-07-18 DIAGNOSIS — K22.70 BARRETT'S ESOPHAGUS WITHOUT DYSPLASIA: ICD-10-CM

## 2024-07-18 DIAGNOSIS — K21.9 GASTROESOPHAGEAL REFLUX DISEASE WITHOUT ESOPHAGITIS: ICD-10-CM

## 2024-07-18 DIAGNOSIS — K86.1 CHRONIC PANCREATITIS, UNSPECIFIED PANCREATITIS TYPE (HCC): ICD-10-CM

## 2024-07-18 DIAGNOSIS — E11.40 TYPE 2 DIABETES MELLITUS WITH DIABETIC NEUROPATHY, WITHOUT LONG-TERM CURRENT USE OF INSULIN (HCC): ICD-10-CM

## 2024-07-18 DIAGNOSIS — I25.10 CORONARY ARTERY DISEASE INVOLVING NATIVE HEART WITHOUT ANGINA PECTORIS, UNSPECIFIED VESSEL OR LESION TYPE: ICD-10-CM

## 2024-07-18 DIAGNOSIS — E78.5 DYSLIPIDEMIA: ICD-10-CM

## 2024-07-18 DIAGNOSIS — K44.9 HIATAL HERNIA: ICD-10-CM

## 2024-07-18 DIAGNOSIS — I10 ESSENTIAL HYPERTENSION WITH GOAL BLOOD PRESSURE LESS THAN 130/80: ICD-10-CM

## 2024-07-18 PROBLEM — M54.6 BACK PAIN OF THORACOLUMBAR REGION: Status: RESOLVED | Noted: 2022-03-27 | Resolved: 2024-07-18

## 2024-07-18 PROBLEM — K86.89 PANCREATIC MASS (HCC): Status: RESOLVED | Noted: 2022-03-26 | Resolved: 2024-07-18

## 2024-07-18 PROBLEM — M54.50 BACK PAIN OF THORACOLUMBAR REGION: Status: RESOLVED | Noted: 2022-03-27 | Resolved: 2024-07-18

## 2024-07-18 PROBLEM — G62.9 SENSORY MOTOR NEUROPATHY: Status: RESOLVED | Noted: 2021-05-19 | Resolved: 2024-07-18

## 2024-07-18 PROBLEM — M25.512 LEFT SHOULDER PAIN: Status: RESOLVED | Noted: 2017-12-07 | Resolved: 2024-07-18

## 2024-07-18 PROBLEM — K85.90 ACUTE PANCREATITIS (HCC): Status: RESOLVED | Noted: 2022-03-07 | Resolved: 2024-07-18

## 2024-07-18 PROCEDURE — 3078F DIAST BP <80 MM HG: CPT | Performed by: FAMILY MEDICINE

## 2024-07-18 PROCEDURE — 3074F SYST BP LT 130 MM HG: CPT | Performed by: FAMILY MEDICINE

## 2024-07-18 PROCEDURE — 3008F BODY MASS INDEX DOCD: CPT | Performed by: FAMILY MEDICINE

## 2024-07-18 PROCEDURE — 99397 PER PM REEVAL EST PAT 65+ YR: CPT | Performed by: FAMILY MEDICINE

## 2024-07-18 NOTE — PROGRESS NOTES
Torres Darnell  6/13/1950    Chief Complaint   Patient presents with    [facility] Adult     Room 9   physical       HPI:   Torres Darnell is a 74 year old male who presents for CPE. Overall doing well. Has been trying to maintain healthy diet and has been consistent with his current medications. He has not completed his labs. He did have his eye exam done, will provide name of the optometrist.     Current Outpatient Medications   Medication Sig Dispense Refill    atenolol 50 MG Oral Tab Take 1 tablet (50 mg total) by mouth daily. 90 tablet 0    Trospium Chloride ER 60 MG Oral Capsule SR 24 Hr Take 1 capsule (60 mg total) by mouth daily.      glipiZIDE ER 10 MG Oral Tablet 24 Hr Take 1 tablet (10 mg total) by mouth 2 (two) times daily with meals. 180 tablet 3    atorvastatin 20 MG Oral Tab Take 1 tablet (20 mg total) by mouth nightly. 90 tablet 0    Mirabegron ER 50 MG Oral Tablet 24 Hr Take 1 tablet (50 mg total) by mouth daily. 90 tablet 3    SITagliptin-metFORMIN HCl (JANUMET)  MG Oral Tab Take 1 tablet by mouth 2 (two) times daily with meals. 180 tablet 0    Empagliflozin (JARDIANCE) 25 MG Oral Tab Take 1 tablet by mouth daily. 90 tablet 1    Sildenafil Citrate 100 MG Oral Tab Take 1 tablet (100 mg total) by mouth as needed for Erectile Dysfunction. 30 tablet 0    finasteride 5 MG Oral Tab Take 1 tablet (5 mg total) by mouth daily. 90 tablet 6    tamsulosin 0.4 MG Oral Cap Take 2 capsules (0.8 mg total) by mouth daily. 180 capsule 5    pantoprazole 40 MG Oral Tab EC Take one tablet (40 mg total) by mouth twice daily, 30 minutes prior to breakfast and 30 minutes prior to dinner. 180 tablet 3    Cyanocobalamin (VITAMIN B-12) 2500 MCG Sublingual SL Tab Place 1,500 mcg under the tongue.  0    AMLODIPINE 2.5 MG Oral Tab Take 1 tablet by mouth once daily 90 tablet 0    BENFOTIAMINE OR Take by mouth.      Coenzyme Q10 (COQ-10 OR) Take by mouth.      aspirin 81 MG Oral Tab EC Take 1 tablet (81 mg total)  by mouth daily.  0    losartan 100 MG Oral Tab Take 1 tablet (100 mg total) by mouth daily.      Multiple Vitamin (TAB-A-VANITA) Oral Tab Take 1 tablet by mouth daily.        No Known Allergies   Past Medical History:    Arthritis    back    Back pain    Calculus of kidney    Coronary atherosclerosis    Diabetes (HCC)    Diabetes mellitus (HCC)    Dizziness    3 episodes of balance issues    Elevated blood pressure reading without diagnosis of hypertension    Encounter for screening fecal occult blood testing    Negative for Occult Blood    Essential hypertension    Frequent urination    Heart attack (HCC)    w/stent placememnt    High blood pressure    High cholesterol    Hyperlipidemia    Kidney stone    Pure hypercholesterolemia    Rash    occassioanal on shin    Sleep disturbance    Stented coronary artery    Visual impairment    contacts and glasses    Wears glasses    Weight loss    Quick 15 lb loss, reason?      Patient Active Problem List   Diagnosis    Tremor of both hands    Poor short term memory    Coronary artery disease involving native heart without angina pectoris    Dyslipidemia    Essential hypertension with goal blood pressure less than 130/80    Thrombocytopenia (HCC)    Gastroesophageal reflux disease without esophagitis    Chronic pancreatitis (HCC)    Type 2 diabetes mellitus with diabetic neuropathy (HCC)    Angeles's esophagus without dysplasia    Hiatal hernia      Past Surgical History:   Procedure Laterality Date    Angioplasty (coronary)      2 stents    Cath bare metal stent (bms)      Cholecystectomy      1985    Egd N/A 9/30/2016    Procedure: ESOPHAGOGASTRODUODENOSCOPY (EGD);  Surgeon: Marimar Ziegler DO;  Location:  ENDOSCOPY    Neck/chest procedure unlisted  1998    ruptured vertebrae-cervical    Percutaneous  angioplasty  (ptca)- pbp only      Removal gallbladder      Spine surgery procedure unlisted  neck surgery in 2002      Family History   Problem Relation Age of Onset     Other (Acyte Appendicitis) Son     Hypertension Other         fam hx    Diabetes Mother     Diabetes Sister     Diabetes Sister     Hypertension Sister     Diabetes Brother     Hypertension Brother     Heart Attack Brother     Hypertension Sister     Hypertension Brother     Heart Attack Brother       Social History     Socioeconomic History    Marital status:    Tobacco Use    Smoking status: Never    Smokeless tobacco: Never   Vaping Use    Vaping status: Never Used   Substance and Sexual Activity    Alcohol use: Not Currently     Comment: Quit Jan 2022.  Used to have a manhattan a day.    Drug use: No    Sexual activity: Yes     Partners: Female   Other Topics Concern    Caffeine Concern No    Exercise Yes     Social Determinants of Health     Financial Resource Strain: Low Risk  (1/24/2022)    [facility], [facility]    Overall Financial Resource Strain (CARDIA)     Difficulty of Paying Living Expenses: Not hard at all   Transportation Needs: No Transportation Needs (1/24/2022)    Received from [facility], [facility]    PRAPARE - Transportation     Lack of Transportation (Medical): No     Lack of Transportation (Non-Medical): No         REVIEW OF SYSTEMS:   GENERAL: feels well otherwise  SKIN: no rash  EYES: no new vision changes  HEENT: not congested  LUNGS: no new dyspnea  CARDIOVASCULAR: no new chest pain    EXAM:   /56   Pulse 58   Ht 6' 3\" (1.905 m)   Wt 179 lb (81.2 kg)   SpO2 98%   BMI 22.37 kg/m²   GENERAL: Well developed, well nourished,in no apparent distress  SKIN: No rashes,no suspicious lesions  EYES: PERRLA, EOMI, conjunctiva are clear  HEENT: atraumatic, normocephalic,ears and throat are clear  NECK: supple,no adenopathy,no bruits  LUNGS: clear to auscultation  CARDIO: RRR without murmur  GI: good BS's,no masses, HSM or tenderness    ASSESSMENT AND PLAN:   Torres Darnell is a 74 year old male who presents for  CPE    Wellness examination    Type 2 diabetes mellitus with diabetic neuropathy, without long-term current use of insulin (HCC)  Will continue current treatment for now and will complete labs within the next week. He will provide information regarding his optometrist. Follow-up pending labs.     Dyslipidemia  Continue statin. Lipid panel pending.     Essential hypertension with goal blood pressure less than 130/80  Controlled on current treatment.     Coronary artery disease involving native heart without angina pectoris, unspecified vessel or lesion type  Continue risk factor optimization, continue ASA and statin.     Angeles's esophagus without dysplasia  Gastroesophageal reflux disease without esophagitis  Hiatal hernia  Chronic pancreatitis, unspecified pancreatitis type (HCC)  Overall stable. Continue PPI. Next EGD recommended in April of 2025.     Screening for colon cancer  - Occult Blood, Fecal, Immunoassay (Blue cards) [E]; Future    Thrombocytopenia (HCC)  CBC pending.     Screening for prostate CA  PSA pending        All questions were answered and the patient agrees with the plan.     Thank you,  Jose Marie MD

## 2024-07-19 ENCOUNTER — TELEPHONE (OUTPATIENT)
Dept: SURGERY | Facility: CLINIC | Age: 74
End: 2024-07-19

## 2024-07-19 DIAGNOSIS — E11.40 TYPE 2 DIABETES MELLITUS WITH DIABETIC NEUROPATHY, WITHOUT LONG-TERM CURRENT USE OF INSULIN (HCC): ICD-10-CM

## 2024-07-19 DIAGNOSIS — N20.0 NEPHROLITHIASIS: Primary | ICD-10-CM

## 2024-07-19 RX ORDER — ATORVASTATIN CALCIUM 20 MG/1
20 TABLET, FILM COATED ORAL NIGHTLY
Qty: 90 TABLET | Refills: 0 | Status: SHIPPED | OUTPATIENT
Start: 2024-07-19

## 2024-07-19 NOTE — TELEPHONE ENCOUNTER
Atorvastatin       Last OV: 7/18/24  Last refill date: 4/26/24 #/refills: 90/0  Upcoming appt:   Future Appointments   Date Time Provider Department Center   5/29/2025 11:15 AM Ruslan Montaño MD [id number]

## 2024-07-19 NOTE — TELEPHONE ENCOUNTER
RN called patient. Reports pain on his back and across his stomach r/t kidney stones. He took Tylenol at home 650 mg at 4 AM and 8:30 AM. He said, he is ok for now.     Encouraged patient to continue to push fluids  Take the Tylenol. Follow the instructions in the container. Not to take Tylenol >4000mg/day  Go to UR or ER if pain worsens.   RN will forward it to MD  to advise.  He agreed to plans.

## 2024-07-19 NOTE — TELEPHONE ENCOUNTER
-----------Non-Face-to-Face------------  Prolonged non-face-to-face time was spent on 6/10/19 from 1415 to 1455 by this reviewer.  This time included medical chart review, medication review, and decision making for the appropriateness of transfer to inpatient rehabilitation.  Please see PM&R Chart Review Consult from 6/10/19 by this provider for detailed summation of the medical chart and the reasoning for current recommendations. In addition to the above, time was spent coordinating care with case management as well as transitional care coordination team in the acceptance and transfer of the patient to the inpatient rehabilitation facility setting.     Patient calling on behalf of patient. Patient is currently experiencing pain and believes to have kidney stones again as he has gone through them before. Patient wife calling to see if patient may be prescribed some type of medication for the pain or to see if an MRI can be placed for patient to have imaging done to rule out the stones. Please advise.

## 2024-07-23 RX ORDER — SITAGLIPTIN AND METFORMIN HYDROCHLORIDE 1000; 50 MG/1; MG/1
1 TABLET, FILM COATED ORAL 2 TIMES DAILY WITH MEALS
Qty: 180 TABLET | Refills: 0 | Status: SHIPPED | OUTPATIENT
Start: 2024-07-23 | End: 2024-07-29 | Stop reason: ALTCHOICE

## 2024-07-23 NOTE — TELEPHONE ENCOUNTER
Please review; protocol failed/No Protocol    Requested Prescriptions   Pending Prescriptions Disp Refills    SITagliptin-metFORMIN HCl (JANUMET)  MG Oral Tab 180 tablet 0     Sig: Take 1 tablet by mouth 2 (two) times daily with meals.       Diabetes Medication Protocol Failed - 7/19/2024 12:11 PM        Failed - Last A1C < 7.5 and within past 6 months     Lab Results   Component Value Date    A1C 9.0 (H) 04/09/2024             Passed - In person appointment or virtual visit in the past 6 mos or appointment in next 3 mos     Recent Outpatient Visits              5 days ago Wellness examination    [address]    Office Visit    1 month ago BPH with obstruction/lower urinary tract symptoms    [facility] Ruslan Montaño MD    Office Visit    2 months ago Type 2 diabetes mellitus with diabetic neuropathy, without long-term current use of insulin ([facility])    [address] Jose Short MD    Office Visit    6 months ago Type 2 diabetes mellitus with diabetic neuropathy, without long-term current use of insulin ([facility])    [address] Jose Short MD    Office Visit    8 months ago Erectile dysfunction of organic origin    [facility] Ruslan Montaño MD    Office Visit          Future Appointments         Provider Department Appt Notes    In 10 months Ruslan Montaño MD [facility] 1 yr follow up                    Passed - Microalbumin procedure in past 12 months or taking ACE/ARB        Passed - EGFRCR or GFRNAA > 50     GFR Evaluation  EGFRCR: 70 , resulted on 4/23/2024          Passed - GFR in the past 12 months           Future Appointments         Provider Department Appt Notes    In 10 months Ruslan Montaño MD [facility]  [facility] Jami Ward 1 yr follow up          Recent Outpatient Visits              5 days ago Wellness examination    [facility], [address] [place], [place] [address]Jami Michael, MD    Office Visit    1 month ago BPH with obstruction/lower urinary tract symptoms    [facility]    [facility]    2 months ago Type 2 diabetes mellitus with diabetic neuropathy, without long-term current use of insulin ([facility])    [facility] [address] [place], [place] [address]Jami Michael, MD    Office Visit    6 months ago Type 2 diabetes mellitus with diabetic neuropathy, without long-term current use of insulin ([facility])    [address] MD    Office Visit    8 months ago Erectile dysfunction of organic origin    [facility]    [facility] Visit

## 2024-07-24 NOTE — TELEPHONE ENCOUNTER
This encounter is now closed.     RN called patient. No answer. Relayed PA's US order  RN reached out to patient via Pick1[employer] and updated that Dr Montaño placed an order of CT instead.

## 2024-07-26 RX ORDER — MIRABEGRON 50 MG/1
50 TABLET, EXTENDED RELEASE ORAL DAILY
Qty: 90 TABLET | Refills: 3 | OUTPATIENT
Start: 2024-07-26 | End: 2025-07-21

## 2024-07-29 ENCOUNTER — TELEPHONE (OUTPATIENT)
Dept: INTERNAL MEDICINE CLINIC | Facility: CLINIC | Age: 74
End: 2024-07-29

## 2024-07-29 ENCOUNTER — LAB ENCOUNTER (OUTPATIENT)
Dept: LAB | Facility: HOSPITAL | Age: 74
End: 2024-07-29
Attending: FAMILY MEDICINE
Payer: COMMERCIAL

## 2024-07-29 DIAGNOSIS — E11.40 TYPE 2 DIABETES MELLITUS WITH DIABETIC NEUROPATHY, WITHOUT LONG-TERM CURRENT USE OF INSULIN (HCC): Primary | ICD-10-CM

## 2024-07-29 DIAGNOSIS — Z00.00 ROUTINE GENERAL MEDICAL EXAMINATION AT A HEALTH CARE FACILITY: ICD-10-CM

## 2024-07-29 DIAGNOSIS — Z12.5 SCREENING FOR MALIGNANT NEOPLASM OF PROSTATE: ICD-10-CM

## 2024-07-29 DIAGNOSIS — Z13.29 SCREENING FOR THYROID DISORDER: ICD-10-CM

## 2024-07-29 DIAGNOSIS — E11.40 TYPE 2 DIABETES MELLITUS WITH DIABETIC NEUROPATHY, WITHOUT LONG-TERM CURRENT USE OF INSULIN (HCC): ICD-10-CM

## 2024-07-29 DIAGNOSIS — Z13.0 SCREENING FOR BLOOD DISEASE: ICD-10-CM

## 2024-07-29 LAB
ALBUMIN SERPL-MCNC: 4.5 G/DL (ref 3.2–4.8)
ALBUMIN/GLOB SERPL: 1.6 {RATIO} (ref 1–2)
ALP LIVER SERPL-CCNC: 78 U/L
ALT SERPL-CCNC: 22 U/L
ANION GAP SERPL CALC-SCNC: 7 MMOL/L (ref 0–18)
AST SERPL-CCNC: 12 U/L (ref ?–34)
BASOPHILS # BLD AUTO: 0.02 X10(3) UL (ref 0–0.2)
BASOPHILS NFR BLD AUTO: 0.3 %
BILIRUB SERPL-MCNC: 0.7 MG/DL (ref 0.2–1.1)
BUN BLD-MCNC: 20 MG/DL (ref 9–23)
CALCIUM BLD-MCNC: 9.6 MG/DL (ref 8.7–10.4)
CHLORIDE SERPL-SCNC: 106 MMOL/L (ref 98–112)
CO2 SERPL-SCNC: 25 MMOL/L (ref 21–32)
COMPLEXED PSA SERPL-MCNC: 0.41 NG/ML (ref ?–4)
CREAT BLD-MCNC: 1.04 MG/DL
EGFRCR SERPLBLD CKD-EPI 2021: 76 ML/MIN/1.73M2 (ref 60–?)
EOSINOPHIL # BLD AUTO: 0.12 X10(3) UL (ref 0–0.7)
EOSINOPHIL NFR BLD AUTO: 1.6 %
ERYTHROCYTE [DISTWIDTH] IN BLOOD BY AUTOMATED COUNT: 13.6 %
EST. AVERAGE GLUCOSE BLD GHB EST-MCNC: 200 MG/DL (ref 68–126)
FASTING STATUS PATIENT QL REPORTED: YES
GLOBULIN PLAS-MCNC: 2.9 G/DL (ref 2–3.5)
GLUCOSE BLD-MCNC: 206 MG/DL (ref 70–99)
HBA1C MFR BLD: 8.6 % (ref ?–5.7)
HCT VFR BLD AUTO: 44 %
HGB BLD-MCNC: 15 G/DL
IMM GRANULOCYTES # BLD AUTO: 0.04 X10(3) UL (ref 0–1)
IMM GRANULOCYTES NFR BLD: 0.5 %
LYMPHOCYTES # BLD AUTO: 1.95 X10(3) UL (ref 1–4)
LYMPHOCYTES NFR BLD AUTO: 25.8 %
MCH RBC QN AUTO: 29.8 PG (ref 26–34)
MCHC RBC AUTO-ENTMCNC: 34.1 G/DL (ref 31–37)
MCV RBC AUTO: 87.5 FL
MONOCYTES # BLD AUTO: 0.55 X10(3) UL (ref 0.1–1)
MONOCYTES NFR BLD AUTO: 7.3 %
NEUTROPHILS # BLD AUTO: 4.87 X10 (3) UL (ref 1.5–7.7)
NEUTROPHILS # BLD AUTO: 4.87 X10(3) UL (ref 1.5–7.7)
NEUTROPHILS NFR BLD AUTO: 64.5 %
OSMOLALITY SERPL CALC.SUM OF ELEC: 295 MOSM/KG (ref 275–295)
PLATELET # BLD AUTO: 217 10(3)UL (ref 150–450)
POTASSIUM SERPL-SCNC: 4.3 MMOL/L (ref 3.5–5.1)
PROT SERPL-MCNC: 7.4 G/DL (ref 5.7–8.2)
RBC # BLD AUTO: 5.03 X10(6)UL
SODIUM SERPL-SCNC: 138 MMOL/L (ref 136–145)
TSI SER-ACNC: 2.22 MIU/ML (ref 0.55–4.78)
WBC # BLD AUTO: 7.6 X10(3) UL (ref 4–11)

## 2024-07-29 PROCEDURE — 84443 ASSAY THYROID STIM HORMONE: CPT

## 2024-07-29 PROCEDURE — 36415 COLL VENOUS BLD VENIPUNCTURE: CPT

## 2024-07-29 PROCEDURE — 85025 COMPLETE CBC W/AUTO DIFF WBC: CPT

## 2024-07-29 PROCEDURE — 83036 HEMOGLOBIN GLYCOSYLATED A1C: CPT

## 2024-07-29 PROCEDURE — 82274 ASSAY TEST FOR BLOOD FECAL: CPT

## 2024-07-29 PROCEDURE — 80053 COMPREHEN METABOLIC PANEL: CPT

## 2024-07-29 RX ORDER — METFORMIN HYDROCHLORIDE 500 MG/1
1000 TABLET, EXTENDED RELEASE ORAL 2 TIMES DAILY WITH MEALS
Qty: 360 TABLET | Refills: 0 | Status: SHIPPED | OUTPATIENT
Start: 2024-07-29 | End: 2024-10-27

## 2024-07-29 NOTE — TELEPHONE ENCOUNTER
Discussed with patient. Has been using Janumet since April. Will split to metformin and Januvia due to cost. Reviewed labs. A1c improving but still not at goal. Continue dietary optimization and repeat labs in 3 months with VV at that time. Please call him to schedule, thank you.

## 2024-07-29 NOTE — TELEPHONE ENCOUNTER
PSR's can you assist patient in scheduling video visit with Dr Marie in 3 months after repeat labs?

## 2024-07-29 NOTE — TELEPHONE ENCOUNTER
Patient is inquirer as to why his medication was changed without any one informing him about the changes he stated that he was on Jardiance and Metformin previously and now he was given   Medication Quantity Refills Start End   SITagliptin-metFORMIN HCl (JANUMET)  MG Oral Tab 180 tablet 0 7/23/2024 10/21/2024   Sig:   Take 1 tablet by mouth 2 (two) times daily with meals.     Route:   Oral     Order #:   [id number]     And patient is stating that this medication is costing him more

## 2024-07-30 ENCOUNTER — LAB ENCOUNTER (OUTPATIENT)
Dept: LAB | Facility: HOSPITAL | Age: 74
End: 2024-07-30
Attending: FAMILY MEDICINE
Payer: COMMERCIAL

## 2024-07-30 DIAGNOSIS — Z12.11 SCREENING FOR COLON CANCER: ICD-10-CM

## 2024-07-30 LAB — HEMOCCULT STL QL: NEGATIVE

## 2024-07-30 NOTE — TELEPHONE ENCOUNTER
Biometrics completed.    Results reviewed with a Registered Nurse; understanding of results and   educational materials was verbalized.   Future Appointments   Date Time Provider Department Center   10/24/2024  1:00 PM Jose Marie MD [facility]   5/29/2025 11:15 AM Ruslan Montaño MD [id number]  Nap 4

## 2024-08-06 ENCOUNTER — PATIENT MESSAGE (OUTPATIENT)
Dept: INTERNAL MEDICINE CLINIC | Facility: CLINIC | Age: 74
End: 2024-08-06

## 2024-08-08 NOTE — TELEPHONE ENCOUNTER
From: Torres Darnell  To: Jose Marie  Sent: 8/6/2024 3:50 PM CDT  Subject: Torres Darnell Eye     At Torres's last visit with Dr. Buchanan, he promised to provide the information for the eye Dr. he is seeing.     Dr. Shanae Silva  Doctor of Optometry  [address], [place] [address]  [phone]  [phone]

## 2024-08-12 RX ORDER — ATORVASTATIN CALCIUM 20 MG/1
20 TABLET, FILM COATED ORAL NIGHTLY
Qty: 90 TABLET | Refills: 0 | Status: SHIPPED | OUTPATIENT
Start: 2024-08-12

## 2024-08-12 NOTE — TELEPHONE ENCOUNTER
Atorvastatin    DOS: n/a  Last OV: 7/18/24  Last refill date: 4/26/24 #/refills: 90/0  Upcoming appt:   Future Appointments   Date Time Provider Department Center   10/24/2024  1:00 PM Jose Marie MD [facility]   5/29/2025 11:15 AM Ruslan Montaño MD [id number] Nap 4

## 2024-08-19 ENCOUNTER — MED REC SCAN ONLY (OUTPATIENT)
Dept: INTERNAL MEDICINE CLINIC | Facility: CLINIC | Age: 74
End: 2024-08-19

## 2024-08-25 ENCOUNTER — APPOINTMENT (OUTPATIENT)
Dept: GENERAL RADIOLOGY | Age: 74
End: 2024-08-25
Attending: PHYSICIAN ASSISTANT
Payer: COMMERCIAL

## 2024-08-25 ENCOUNTER — HOSPITAL ENCOUNTER (OUTPATIENT)
Age: 74
Discharge: HOME | End: 2024-08-25
Payer: COMMERCIAL

## 2024-08-25 ENCOUNTER — TELEPHONE (OUTPATIENT)
Dept: FAMILY MEDICINE CLINIC | Facility: CLINIC | Age: 74
End: 2024-08-25

## 2024-08-25 VITALS
RESPIRATION RATE: 18 BRPM | SYSTOLIC BLOOD PRESSURE: 117 MMHG | TEMPERATURE: 98 F | DIASTOLIC BLOOD PRESSURE: 72 MMHG | HEART RATE: 72 BPM | OXYGEN SATURATION: 99 %

## 2024-08-25 DIAGNOSIS — S60.222A CONTUSION OF LEFT HAND, INITIAL ENCOUNTER: Primary | ICD-10-CM

## 2024-08-25 DIAGNOSIS — S61.412A SKIN TEAR OF HAND WITHOUT COMPLICATION, LEFT, INITIAL ENCOUNTER: ICD-10-CM

## 2024-08-25 PROCEDURE — 99213 OFFICE O/P EST LOW 20 MIN: CPT | Performed by: PHYSICIAN ASSISTANT

## 2024-08-25 PROCEDURE — 90715 TDAP VACCINE 7 YRS/> IM: CPT | Performed by: PHYSICIAN ASSISTANT

## 2024-08-25 PROCEDURE — 73130 X-RAY EXAM OF HAND: CPT | Performed by: PHYSICIAN ASSISTANT

## 2024-08-25 PROCEDURE — 90471 IMMUNIZATION ADMIN: CPT | Performed by: PHYSICIAN ASSISTANT

## 2024-08-25 NOTE — TELEPHONE ENCOUNTER
Telephone encounter placed to review on my way appointment notes.  Patient reports that he was changing a tire, the jose slipped, and the patient trapped his hand between his tire and car wheel well.  Patient states he pulled his hand from this area and sustained a skin tear on this hand.  Patient states this happened yesterday and the area continues to bleed.  Reviewed the patient would be best served at a higher level of care.  Recommend [facility] emergency room or [place] immediate care.  Patient verbalized understanding and agrees with plan.  Appointment canceled.

## 2024-08-25 NOTE — ED INITIAL ASSESSMENT (HPI)
Pt c/o pain and swelling to left hand, rpt he was changing tire on his car last night when the jose slipped and his hand fell under the car

## 2024-08-25 NOTE — ED PROVIDER NOTES
Patient Seen in: [facility]mediate [facility]      History     Chief Complaint   Patient presents with    Arm or Hand Injury     Stated Complaint: hand injury    Subjective:   HPI    74-year-old male presents to the IC with left hand pain.  Injured it while he was changing a tire yesterday.  Unknown tetanus status.    Objective:   Past Medical History:    Arthritis    back    Back pain    Calculus of kidney    Coronary atherosclerosis    Diabetes (HCC)    Diabetes mellitus (HCC)    Dizziness    3 episodes of balance issues    Elevated blood pressure reading without diagnosis of hypertension    Encounter for screening fecal occult blood testing    Negative for Occult Blood    Essential hypertension    Frequent urination    Heart attack (HCC)    w/stent placememnt    High blood pressure    High cholesterol    Hyperlipidemia    Kidney stone    Pure hypercholesterolemia    Rash    occassioanal on shin    Sleep disturbance    Stented coronary artery    Visual impairment    contacts and glasses    Wears glasses    Weight loss    Quick 15 lb loss, reason?              Past Surgical History:   Procedure Laterality Date    Angioplasty (coronary)      2 stents    Cath bare metal stent (bms)      Cholecystectomy      1985    Egd N/A 9/30/2016    Procedure: ESOPHAGOGASTRODUODENOSCOPY (EGD);  Surgeon: Marimar Ziegler DO;  Location:  ENDOSCOPY    Neck/chest procedure unlisted  1998    ruptured vertebrae-cervical    Percutaneous  angioplasty  (ptca)- pbp only      Removal gallbladder      Spine surgery procedure unlisted  neck surgery in 2002                Social History     Socioeconomic History    Marital status:    Tobacco Use    Smoking status: Never    Smokeless tobacco: Never   Vaping Use    Vaping status: Never Used   Substance and Sexual Activity    Alcohol use: Not Currently     Comment: Quit Jan 2022.  Used to have a manhattan a day.    Drug use: No    Sexual activity: Yes     Partners: Female   Other  Topics Concern    Caffeine Concern No    Exercise Yes     Social Determinants of Health     Financial Resource Strain: Low Risk  (1/24/2022)    [facility]    Overall Financial Resource Strain (CARDIA)     Difficulty of Paying Living Expenses: Not hard at all   Transportation Needs: No Transportation Needs (1/24/2022)    [facility]    PRAPARE - Transportation     Lack of Transportation (Medical): No     Lack of Transportation (Non-Medical): No              Review of Systems    Positive for stated Chief Complaint: Arm or Hand Injury    Other systems are as noted in HPI.  Constitutional and vital signs reviewed.      All other systems reviewed and negative except as noted above.    Physical Exam     ED Triage Vitals [08/25/24 0952]   /72   Pulse 72   Resp 18   Temp 97.9 °F (36.6 °C)   Temp src Oral   SpO2 99 %   O2 Device None (Room air)       Current Vitals:   Vital Signs  BP: 117/72  Pulse: 72  Resp: 18  Temp: 97.9 °F (36.6 °C)  Temp src: Oral    Oxygen Therapy  SpO2: 99 %  O2 Device: None (Room air)            Physical Exam  Vitals and nursing note reviewed.   Constitutional:       Appearance: He is well-developed.   HENT:      Head: Atraumatic.      Right Ear: External ear normal.      Left Ear: External ear normal.   Eyes:      Conjunctiva/sclera: Conjunctivae normal.   Cardiovascular:      Rate and Rhythm: Normal rate.   Pulmonary:      Effort: Pulmonary effort is normal.   Musculoskeletal:      Cervical back: Normal range of motion and neck supple.      Comments: Contusion and swelling noted to the back of left hand.  No pain with palpation of all fingers/thumb.  Nontender wrist.  Normal range of motion to the left hand and wrist.  Skin tear noted to the dorsum of left hand.   Skin:     General: Skin is warm and dry.      Capillary Refill: Capillary refill takes less than 2 seconds.   Neurological:      Mental  Status: He is alert.   Psychiatric:         Mood and Affect: Mood normal.               ED Course   Labs Reviewed - No data to display                   MDM      74-year-old male presents to the IC for evaluation of left hand pain/injury.    Differential diagnosis includes but not limited to:  Skin tear, contusion, fracture    Tetanus updated today.  No wound repair required, nonadherent dressing applied on skin tear.  X-ray shows no acute fracture.  I have reviewed the x-ray images independently.    Patient informed of all results.  Advised him to keep the wound cleaning dry.  Ice to reduce swelling.  All questions answered.                                   Medical Decision Making  Amount and/or Complexity of Data Reviewed  Independent Historian:      Details: daughter  Radiology: ordered and independent interpretation performed. Decision-making details documented in ED Course.        Disposition and Plan     Clinical Impression:  1. Contusion of left hand, initial encounter    2. Skin tear of hand without complication, left, initial encounter         Disposition:  Discharge  8/25/2024 10:44 am    Follow-up:  Jose Marie MD  [address] [place] [id number]  [phone]                Medications Prescribed:  Current Discharge Medication List

## 2024-08-25 NOTE — DISCHARGE INSTRUCTIONS
Continue to keep the wound clean and dry.  Apply antibiotic ointment.  Rest and ice your hand at home.  Return for evaluation if there are any signs of infection.

## 2024-10-13 DIAGNOSIS — E11.40 TYPE 2 DIABETES MELLITUS WITH DIABETIC NEUROPATHY, WITHOUT LONG-TERM CURRENT USE OF INSULIN (HCC): ICD-10-CM

## 2024-10-13 DIAGNOSIS — E11.9 TYPE 2 DIABETES MELLITUS WITHOUT COMPLICATION, WITHOUT LONG-TERM CURRENT USE OF INSULIN (HCC): ICD-10-CM

## 2024-10-14 RX ORDER — ATORVASTATIN CALCIUM 20 MG/1
20 TABLET, FILM COATED ORAL NIGHTLY
Qty: 90 TABLET | Refills: 0 | Status: SHIPPED | OUTPATIENT
Start: 2024-10-14

## 2024-10-14 RX ORDER — MIRABEGRON 50 MG/1
50 TABLET, EXTENDED RELEASE ORAL DAILY
Qty: 90 TABLET | Refills: 3 | Status: SHIPPED | OUTPATIENT
Start: 2024-10-14 | End: 2025-10-09

## 2024-10-14 RX ORDER — ATENOLOL 50 MG/1
50 TABLET ORAL DAILY
Qty: 90 TABLET | Refills: 0 | Status: SHIPPED | OUTPATIENT
Start: 2024-10-14

## 2024-10-14 RX ORDER — FINASTERIDE 5 MG/1
5 TABLET, FILM COATED ORAL DAILY
Qty: 90 TABLET | Refills: 3 | Status: SHIPPED | OUTPATIENT
Start: 2024-10-14

## 2024-10-14 NOTE — TELEPHONE ENCOUNTER
RN approved the refill request of Finasteride and Mirabegron  Last office visit on 4/11/24:    PLAN:   -Continue tamsulosin and finasteride, obstructive LUTS are minimally bothersome at this time  -Stop trospium, start mirabegron 50 mg daily since overactive LUTS are his more bothersome symptoms  -Return to clinic in 6 weeks for symptom check

## 2024-10-14 NOTE — TELEPHONE ENCOUNTER
Atenolol    DOS: n/a  Last OV: 7/18/24  Last refill date: 7/8/24  #/refills: 90/0  Upcoming appt:   Future Appointments   Date Time Provider Department Center   10/24/2024  1:00 PM Jose Marie MD [facility]   5/29/2025 11:15 AM Ruslan Montaño MD [id number] Nap 4     2. Atorvastatin    Last refill date: 8/12/24 #/refills: 90/0

## 2024-10-16 NOTE — TELEPHONE ENCOUNTER
Please review; protocol failed/No Protocol    Requested Prescriptions   Pending Prescriptions Disp Refills    empagliflozin (JARDIANCE) 25 MG Oral Tab 90 tablet 1     Sig: Take 1 tablet (25 mg total) by mouth daily.       Diabetes Medication Protocol Failed - 10/16/2024  8:50 AM        Failed - Last A1C < 7.5 and within past 6 months     Lab Results   Component Value Date    A1C 8.6 (H) 07/29/2024             Passed - In person appointment or virtual visit in the past 6 mos or appointment in next 3 mos     Recent Outpatient Visits              3 months ago Wellness examination    [address]    Office Visit    4 months ago BPH with obstruction/lower urinary tract symptoms    [facility]    Office Visit    5 months ago Type 2 diabetes mellitus with diabetic neuropathy, without long-term current use of insulin ([facility])    [address], MD    Office Visit    8 months ago Type 2 diabetes mellitus with diabetic neuropathy, without long-term current use of insulin ([facility])    [address] Jose Short MD    Office Visit    11 months ago Erectile dysfunction of organic origin    [facility] Ruslan Montaño MD    Office Visit          Future Appointments         Provider Department Appt Notes    In 1 week Jose Marie MD [address] 3 mth fup    In 7 months Ruslan Montaño MD [facility] 1 yr follow up                    Passed - Microalbumin procedure in past 12 months or taking ACE/ARB        Passed - EGFRCR or GFRNAA > 50     GFR Evaluation  EGFRCR: 76 , resulted on 7/29/2024          Passed - GFR in the past 12 months          SITagliptin Phosphate (JANUVIA) 100 MG Oral  Tab 90 tablet 0     Sig: Take 1 tablet (100 mg total) by mouth daily.       Diabetes Medication Protocol Failed - 10/16/2024  8:50 AM        Failed - Last A1C < 7.5 and within past 6 months     Lab Results   Component Value Date    A1C 8.6 (H) 07/29/2024             Passed - In person appointment or virtual visit in the past 6 mos or appointment in next 3 mos     Recent Outpatient Visits              3 months ago Wellness examination    [address] Jose Marie MD    Office Visit    4 months ago BPH with obstruction/lower urinary tract symptoms    [facility] Ruslan Montaño MD    Office Visit    5 months ago Type 2 diabetes mellitus with diabetic neuropathy, without long-term current use of insulin ([facility])    [address] Jose Marie MD    Office Visit    8 months ago Type 2 diabetes mellitus with diabetic neuropathy, without long-term current use of insulin ([facility])    [address] Jose Marie MD    Office Visit    11 months ago Erectile dysfunction of organic origin    [facility] Ruslan Montaño MD    Office Visit          Future Appointments         Provider Department Appt Notes    In 1 week Jose Marie MD [address] 3 mth fup    In 7 months Ruslan Montaño MD [facility] 1 yr follow up                    Passed - Microalbumin procedure in past 12 months or taking ACE/ARB        Passed - EGFRCR or GFRNAA > 50     GFR Evaluation  EGFRCR: 76 , resulted on 7/29/2024          Passed - GFR in the past 12 months           Future Appointments         Provider Department Appt Notes    In 1 week Jose Marie MD [address] 3 mth fup    In 7 months Ruslan Montaño MD  [facility] 1 yr follow up          Recent Outpatient Visits              3 months ago Wellness examination    [facility], [address] [place], [place] [address]Jami Michael, MD    Office Visit    4 months ago BPH with obstruction/lower urinary tract symptoms    [facility]    [facility]    5 months ago Type 2 diabetes mellitus with diabetic neuropathy, without long-term current use of insulin ([facility])    [facility] [address] [place], [place] [address]Jami Michael, MD    Office Visit    8 months ago Type 2 diabetes mellitus with diabetic neuropathy, without long-term current use of insulin ([facility])    [facility], [address] [place], [place] [address]Jami Michael, MD    Office Visit    11 months ago Erectile dysfunction of organic origin    [facility]    [facility] Visit

## 2024-10-18 DIAGNOSIS — E11.40 TYPE 2 DIABETES MELLITUS WITH DIABETIC NEUROPATHY, WITHOUT LONG-TERM CURRENT USE OF INSULIN (HCC): ICD-10-CM

## 2024-10-24 ENCOUNTER — OFFICE VISIT (OUTPATIENT)
Dept: INTERNAL MEDICINE CLINIC | Facility: CLINIC | Age: 74
End: 2024-10-24
Payer: COMMERCIAL

## 2024-10-24 VITALS
OXYGEN SATURATION: 96 % | WEIGHT: 183 LBS | DIASTOLIC BLOOD PRESSURE: 62 MMHG | HEIGHT: 74.02 IN | RESPIRATION RATE: 18 BRPM | BODY MASS INDEX: 23.49 KG/M2 | TEMPERATURE: 97 F | HEART RATE: 74 BPM | SYSTOLIC BLOOD PRESSURE: 112 MMHG

## 2024-10-24 DIAGNOSIS — I10 ESSENTIAL HYPERTENSION WITH GOAL BLOOD PRESSURE LESS THAN 130/80: ICD-10-CM

## 2024-10-24 DIAGNOSIS — E11.40 TYPE 2 DIABETES MELLITUS WITH DIABETIC NEUROPATHY, WITHOUT LONG-TERM CURRENT USE OF INSULIN (HCC): Primary | ICD-10-CM

## 2024-10-24 LAB — HEMOGLOBIN A1C: 8 % (ref 4.3–5.6)

## 2024-10-24 PROCEDURE — 83036 HEMOGLOBIN GLYCOSYLATED A1C: CPT | Performed by: FAMILY MEDICINE

## 2024-10-24 PROCEDURE — 90471 IMMUNIZATION ADMIN: CPT | Performed by: FAMILY MEDICINE

## 2024-10-24 PROCEDURE — 3052F HG A1C>EQUAL 8.0%<EQUAL 9.0%: CPT | Performed by: FAMILY MEDICINE

## 2024-10-24 PROCEDURE — 3074F SYST BP LT 130 MM HG: CPT | Performed by: FAMILY MEDICINE

## 2024-10-24 PROCEDURE — 3008F BODY MASS INDEX DOCD: CPT | Performed by: FAMILY MEDICINE

## 2024-10-24 PROCEDURE — 99214 OFFICE O/P EST MOD 30 MIN: CPT | Performed by: FAMILY MEDICINE

## 2024-10-24 PROCEDURE — 90662 IIV NO PRSV INCREASED AG IM: CPT | Performed by: FAMILY MEDICINE

## 2024-10-24 PROCEDURE — 3078F DIAST BP <80 MM HG: CPT | Performed by: FAMILY MEDICINE

## 2024-10-24 NOTE — PROGRESS NOTES
Torres Darnell  6/13/1950    Chief Complaint   Patient presents with    Follow - Up     ES rm - 8 - f/u for diabetes       HPI:   Torres Darnell is a 74 year old male who presents for follow-up. He has been trying to optimize his diet. Has been consistent with his current medications. His glycemic control is slowly improving.     Current Outpatient Medications   Medication Sig Dispense Refill    empagliflozin (JARDIANCE) 25 MG Oral Tab Take 1 tablet (25 mg total) by mouth daily. 90 tablet 3    SITagliptin Phosphate (JANUVIA) 100 MG Oral Tab Take 1 tablet (100 mg total) by mouth daily. 90 tablet 3    atenolol 50 MG Oral Tab Take 1 tablet (50 mg total) by mouth daily. 90 tablet 0    atorvastatin 20 MG Oral Tab Take 1 tablet (20 mg total) by mouth nightly. 90 tablet 0    finasteride 5 MG Oral Tab Take 1 tablet (5 mg total) by mouth daily. 90 tablet 3    Mirabegron ER 50 MG Oral Tablet 24 Hr Take 1 tablet (50 mg total) by mouth daily. 90 tablet 3    metFORMIN  MG Oral Tablet 24 Hr Take 2 tablets (1,000 mg total) by mouth 2 (two) times daily with meals. 360 tablet 0    glipiZIDE ER 10 MG Oral Tablet 24 Hr Take 1 tablet (10 mg total) by mouth 2 (two) times daily with meals. 180 tablet 3    Sildenafil Citrate 100 MG Oral Tab Take 1 tablet (100 mg total) by mouth as needed for Erectile Dysfunction. 30 tablet 0    tamsulosin 0.4 MG Oral Cap Take 2 capsules (0.8 mg total) by mouth daily. 180 capsule 5    pantoprazole 40 MG Oral Tab EC Take one tablet (40 mg total) by mouth twice daily, 30 minutes prior to breakfast and 30 minutes prior to dinner. 180 tablet 3    Cyanocobalamin (VITAMIN B-12) 2500 MCG Sublingual SL Tab Place 1,500 mcg under the tongue.  0    AMLODIPINE 2.5 MG Oral Tab Take 1 tablet by mouth once daily 90 tablet 0    BENFOTIAMINE OR Take by mouth.      Coenzyme Q10 (COQ-10 OR) Take by mouth.      aspirin 81 MG Oral Tab EC Take 1 tablet (81 mg total) by mouth daily.  0    losartan 100 MG Oral  Tab Take 1 tablet (100 mg total) by mouth daily.      Multiple Vitamin (TAB-A-VANITA) Oral Tab Take 1 tablet by mouth daily.        Allergies[1]   Past Medical History:    Arthritis    back    Back pain    Calculus of kidney    Coronary atherosclerosis    Diabetes (HCC)    Diabetes mellitus (HCC)    Dizziness    3 episodes of balance issues    Elevated blood pressure reading without diagnosis of hypertension    Encounter for screening fecal occult blood testing    Negative for Occult Blood    Essential hypertension    Frequent urination    Heart attack (HCC)    w/stent placememnt    High blood pressure    High cholesterol    Hyperlipidemia    Kidney stone    Pure hypercholesterolemia    Rash    occassioanal on shin    Sleep disturbance    Stented coronary artery    Visual impairment    contacts and glasses    Wears glasses    Weight loss    Quick 15 lb loss, reason?      Patient Active Problem List   Diagnosis    Tremor of both hands    Poor short term memory    Coronary artery disease involving native heart without angina pectoris    Dyslipidemia    Essential hypertension with goal blood pressure less than 130/80    Thrombocytopenia (HCC)    Gastroesophageal reflux disease without esophagitis    Chronic pancreatitis (HCC)    Type 2 diabetes mellitus with diabetic neuropathy (HCC)    Angeles's esophagus without dysplasia    Hiatal hernia      Past Surgical History:   Procedure Laterality Date    Angioplasty (coronary)      2 stents    Cath bare metal stent (bms)      Cholecystectomy      1985    Egd N/A 9/30/2016    Procedure: ESOPHAGOGASTRODUODENOSCOPY (EGD);  Surgeon: Marimar Ziegler DO;  Location:  ENDOSCOPY    Neck/chest procedure unlisted  1998    ruptured vertebrae-cervical    Percutaneous  angioplasty  (ptca)- pbp only      Removal gallbladder      Spine surgery procedure unlisted  neck surgery in 2002      Family History   Problem Relation Age of Onset    Other (Acyte Appendicitis) Son     Hypertension  Other         fam hx    Diabetes Mother     Diabetes Sister     Diabetes Sister     Hypertension Sister     Diabetes Brother     Hypertension Brother     Heart Attack Brother     Hypertension Sister     Hypertension Brother     Heart Attack Brother       Social History     Socioeconomic History    Marital status:    Tobacco Use    Smoking status: Never    Smokeless tobacco: Never   Vaping Use    Vaping status: Never Used   Substance and Sexual Activity    Alcohol use: Not Currently     Comment: Quit Jan 2022.  Used to have a manhattan a day.    Drug use: No    Sexual activity: Yes     Partners: Female   Other Topics Concern    Caffeine Concern No    Exercise Yes     Social Drivers of Health     Financial Resource Strain: Low Risk  (1/24/2022)    [facility]    Overall Financial Resource Strain (CARDIA)     Difficulty of Paying Living Expenses: Not hard at all   Transportation Needs: No Transportation Needs (1/24/2022)    [facility]    PRAPARE - Transportation     Lack of Transportation (Medical): No     Lack of Transportation (Non-Medical): No         REVIEW OF SYSTEMS:   GENERAL: feels well otherwise, no recent illness.     EXAM:   /62 (BP Location: Left arm, Patient Position: Sitting, Cuff Size: large)   Pulse 74   Temp 96.8 °F (36 °C) (Temporal)   Resp 18   Ht 6' 2.02\" (1.88 m)   Wt 183 lb (83 kg)   SpO2 96%   BMI 23.49 kg/m²   GENERAL: Well developed, well nourished,in no apparent distress  HEENT: atraumatic, normocephalic  LUNGS: normal work of breathing  CARDIO: RRR    ASSESSMENT AND PLAN:   Torres Darnell is a 74 year old male who presents for follow-up    Type 2 diabetes mellitus with diabetic neuropathy, without long-term current use of insulin ([facility])  A1c slowly improving, not yet at goal. He will continue to make dietary improvements. Will continue current pharmacologic treatment. F/U in 3  months with labs prior.   - POC Hemoglobin A1C  - CMP in 3 months; Future  - Lipid in 3 months; Future  - Hemoglobin A1C in 3 months; Future  - Microalb/Creat Ratio, Random Urine in 3 months; Future    HTN  BP at goal on current treatment    All questions were answered and the patient agrees with the plan.     Thank you,  Jose Marie MD         [1] No Known Allergies

## 2024-11-07 ENCOUNTER — IMMUNIZATION (OUTPATIENT)
Dept: LAB | Age: 74
End: 2024-11-07
Attending: EMERGENCY MEDICINE
Payer: COMMERCIAL

## 2024-11-07 DIAGNOSIS — Z23 NEED FOR VACCINATION: Primary | ICD-10-CM

## 2024-11-07 PROCEDURE — 90480 ADMN SARSCOV2 VAC 1/ONLY CMP: CPT

## 2024-11-10 RX ORDER — ATORVASTATIN CALCIUM 20 MG/1
20 TABLET, FILM COATED ORAL NIGHTLY
Qty: 90 TABLET | Refills: 0 | Status: CANCELLED | OUTPATIENT
Start: 2024-11-10

## 2024-11-11 NOTE — TELEPHONE ENCOUNTER
Atrovastatin 20 mg  Last OV: 10/24/24  Last refill date: 10/14/24     #/refills: 90/0  Upcoming appt:   Future Appointments   Date Time Provider Department Center   1/30/2025  1:00 PM Jose Marie MD [facility]   5/29/2025 11:15 AM Ruslan Montaño MD [id number] Nap 4

## 2024-11-17 DIAGNOSIS — N52.9 ERECTILE DYSFUNCTION, UNSPECIFIED ERECTILE DYSFUNCTION TYPE: ICD-10-CM

## 2024-11-21 RX ORDER — SILDENAFIL 100 MG/1
100 TABLET, FILM COATED ORAL AS NEEDED
Qty: 30 TABLET | Refills: 1 | Status: SHIPPED | OUTPATIENT
Start: 2024-11-21

## 2024-11-21 NOTE — TELEPHONE ENCOUNTER
Refill passed per [facility] protocols.    Requested Prescriptions   Pending Prescriptions Disp Refills    SILDENAFIL CITRATE 100 MG Oral Tab [Pharmacy Med Name: Sildenafil Citrate 100 MG Oral Tablet] 30 tablet 1     Sig: TAKE 1 TABLET BY MOUTH AS NEEDED FOR ERECTILE DYSFUNCTION       Genitourinary Medications Passed - 11/21/2024  1:16 PM        Passed - Patient does not have pulmonary hypertension on problem list        Passed - In person appointment or virtual visit in the past 12 mos or appointment in next 3 mos     Recent Outpatient Visits              4 weeks ago Type 2 diabetes mellitus with diabetic neuropathy, without long-term current use of insulin ([facility])    [address] Jose Short MD    Office Visit    4 months ago Wellness examination    [address]    Office Visit    5 months ago BPH with obstruction/lower urinary tract symptoms    [facility] Ruslan Montaño MD    Office Visit    7 months ago Type 2 diabetes mellitus with diabetic neuropathy, without long-term current use of insulin ([facility])    [address]    Office Visit    10 months ago Type 2 diabetes mellitus with diabetic neuropathy, without long-term current use of insulin ([facility])    [address] Jose Marie MD    Office Visit          Future Appointments         Provider Department Appt Notes    In 2 months Jose Marie MD [address] FUP-3 months    In 6 months Ruslan Montaño MD [facility] 1 yr follow up

## 2025-01-12 ENCOUNTER — PATIENT MESSAGE (OUTPATIENT)
Dept: INTERNAL MEDICINE CLINIC | Facility: CLINIC | Age: 75
End: 2025-01-12

## 2025-01-12 DIAGNOSIS — R35.0 BENIGN PROSTATIC HYPERPLASIA WITH URINARY FREQUENCY: ICD-10-CM

## 2025-01-12 DIAGNOSIS — E11.40 TYPE 2 DIABETES MELLITUS WITH DIABETIC NEUROPATHY, WITHOUT LONG-TERM CURRENT USE OF INSULIN (HCC): ICD-10-CM

## 2025-01-12 DIAGNOSIS — N40.1 BENIGN PROSTATIC HYPERPLASIA WITH URINARY FREQUENCY: ICD-10-CM

## 2025-01-13 RX ORDER — ATORVASTATIN CALCIUM 20 MG/1
20 TABLET, FILM COATED ORAL NIGHTLY
Qty: 90 TABLET | Refills: 0 | Status: SHIPPED | OUTPATIENT
Start: 2025-01-13

## 2025-01-13 RX ORDER — ATENOLOL 50 MG/1
50 TABLET ORAL DAILY
Qty: 90 TABLET | Refills: 0 | Status: SHIPPED | OUTPATIENT
Start: 2025-01-13

## 2025-01-13 NOTE — TELEPHONE ENCOUNTER
Atenolol 50 mg  Last OV: 10/24/24  Last refill date: 10/14/24     #/refills: 90/0    Atorvastatin 20 mg  Last OV: 10/24/24  Last refill date: 10/14/24     #/refills: 90/0  Future Appointments   Date Time Provider Department Center   1/30/2025  1:00 PM Jose Marie MD [facility] 75[facility]   2/7/2025  2:20 PM Wiliam Singh MD SGINP E[facility] SUB GI   5/29/2025 11:15 AM Ruslan Montaño MD [id number] EC Nap 4

## 2025-01-16 RX ORDER — TAMSULOSIN HYDROCHLORIDE 0.4 MG/1
0.8 CAPSULE ORAL DAILY
Qty: 180 CAPSULE | Refills: 5 | Status: SHIPPED | OUTPATIENT
Start: 2025-01-16

## 2025-01-16 RX ORDER — FINASTERIDE 5 MG/1
5 TABLET, FILM COATED ORAL DAILY
Qty: 90 TABLET | Refills: 3 | Status: SHIPPED | OUTPATIENT
Start: 2025-01-16

## 2025-01-16 NOTE — TELEPHONE ENCOUNTER
Disp Refills Start End    SITagliptin Phosphate (JANUVIA) 100 MG Oral Tab 90 tablet 3 10/16/2024 --    Sig - Route: Take 1 tablet (100 mg total) by mouth daily. - Oral    Sent to pharmacy as: SITagliptin Phosphate 100 MG Oral Tablet (Januvia)    E-Prescribing Status: Receipt confirmed by pharmacy (10/16/2024 10:49 AM CDT)      Associated Diagnoses    Type 2 diabetes mellitus with diabetic neuropathy, without long-term current use of insulin ([facility])        Pharmacy    [address] [phone], [phone]

## 2025-01-21 ENCOUNTER — LAB ENCOUNTER (OUTPATIENT)
Dept: LAB | Age: 75
End: 2025-01-21
Attending: FAMILY MEDICINE
Payer: COMMERCIAL

## 2025-01-21 DIAGNOSIS — E11.40 TYPE 2 DIABETES MELLITUS WITH DIABETIC NEUROPATHY, WITHOUT LONG-TERM CURRENT USE OF INSULIN (HCC): ICD-10-CM

## 2025-01-21 LAB
ALBUMIN SERPL-MCNC: 4.3 G/DL (ref 3.2–4.8)
ALBUMIN/GLOB SERPL: 1.4 {RATIO} (ref 1–2)
ALP LIVER SERPL-CCNC: 64 U/L
ALT SERPL-CCNC: 19 U/L
ANION GAP SERPL CALC-SCNC: 10 MMOL/L (ref 0–18)
AST SERPL-CCNC: 12 U/L (ref ?–34)
BILIRUB SERPL-MCNC: 0.9 MG/DL (ref 0.2–1.1)
BUN BLD-MCNC: 20 MG/DL (ref 9–23)
CALCIUM BLD-MCNC: 9.4 MG/DL (ref 8.7–10.6)
CHLORIDE SERPL-SCNC: 101 MMOL/L (ref 98–112)
CHOLEST SERPL-MCNC: 120 MG/DL (ref ?–200)
CO2 SERPL-SCNC: 26 MMOL/L (ref 21–32)
CREAT BLD-MCNC: 1.2 MG/DL
CREAT UR-SCNC: 40.4 MG/DL
EGFRCR SERPLBLD CKD-EPI 2021: 64 ML/MIN/1.73M2 (ref 60–?)
EST. AVERAGE GLUCOSE BLD GHB EST-MCNC: 192 MG/DL (ref 68–126)
FASTING PATIENT LIPID ANSWER: YES
FASTING STATUS PATIENT QL REPORTED: YES
GLOBULIN PLAS-MCNC: 3.1 G/DL (ref 2–3.5)
GLUCOSE BLD-MCNC: 169 MG/DL (ref 70–99)
HBA1C MFR BLD: 8.3 % (ref ?–5.7)
HDLC SERPL-MCNC: 37 MG/DL (ref 40–59)
LDLC SERPL CALC-MCNC: 57 MG/DL (ref ?–100)
MICROALBUMIN UR-MCNC: <0.3 MG/DL
NONHDLC SERPL-MCNC: 83 MG/DL (ref ?–130)
OSMOLALITY SERPL CALC.SUM OF ELEC: 291 MOSM/KG (ref 275–295)
POTASSIUM SERPL-SCNC: 4.4 MMOL/L (ref 3.5–5.1)
PROT SERPL-MCNC: 7.4 G/DL (ref 5.7–8.2)
SODIUM SERPL-SCNC: 137 MMOL/L (ref 136–145)
TRIGL SERPL-MCNC: 150 MG/DL (ref 30–149)
VLDLC SERPL CALC-MCNC: 22 MG/DL (ref 0–30)

## 2025-01-21 PROCEDURE — 80053 COMPREHEN METABOLIC PANEL: CPT

## 2025-01-21 PROCEDURE — 82570 ASSAY OF URINE CREATININE: CPT

## 2025-01-21 PROCEDURE — 80061 LIPID PANEL: CPT

## 2025-01-21 PROCEDURE — 83036 HEMOGLOBIN GLYCOSYLATED A1C: CPT

## 2025-01-21 PROCEDURE — 36415 COLL VENOUS BLD VENIPUNCTURE: CPT

## 2025-01-21 PROCEDURE — 82043 UR ALBUMIN QUANTITATIVE: CPT

## 2025-01-25 DIAGNOSIS — E11.9 TYPE 2 DIABETES MELLITUS WITHOUT COMPLICATION, WITHOUT LONG-TERM CURRENT USE OF INSULIN (HCC): ICD-10-CM

## 2025-01-25 DIAGNOSIS — E11.40 TYPE 2 DIABETES MELLITUS WITH DIABETIC NEUROPATHY, WITHOUT LONG-TERM CURRENT USE OF INSULIN (HCC): ICD-10-CM

## 2025-01-27 RX ORDER — ATORVASTATIN CALCIUM 20 MG/1
20 TABLET, FILM COATED ORAL NIGHTLY
Qty: 90 TABLET | Refills: 0 | Status: SHIPPED | OUTPATIENT
Start: 2025-01-27

## 2025-01-27 NOTE — TELEPHONE ENCOUNTER
Atorvastatin 20mg    DOS: n/a  Last OV: 10/24/24  Last refill date: 11/12/24 #/refills: 90/0  Upcoming appt:   Future Appointments   Date Time Provider Department Center   1/30/2025  1:00 PM Jose Marie MD [facility] 75[facility]   2/7/2025  2:20 PM Wiliam Singh MD [facility] E[facility] SUB GI   5/29/2025 11:15 AM Ruslan Montaño MD [id number] EC Nap 4

## 2025-01-30 ENCOUNTER — OFFICE VISIT (OUTPATIENT)
Dept: INTERNAL MEDICINE CLINIC | Facility: CLINIC | Age: 75
End: 2025-01-30
Payer: COMMERCIAL

## 2025-01-30 VITALS
SYSTOLIC BLOOD PRESSURE: 128 MMHG | HEART RATE: 68 BPM | WEIGHT: 182 LBS | TEMPERATURE: 97 F | DIASTOLIC BLOOD PRESSURE: 64 MMHG | OXYGEN SATURATION: 97 % | BODY MASS INDEX: 23 KG/M2

## 2025-01-30 DIAGNOSIS — E11.40 TYPE 2 DIABETES MELLITUS WITH DIABETIC NEUROPATHY, WITHOUT LONG-TERM CURRENT USE OF INSULIN (HCC): Primary | ICD-10-CM

## 2025-01-30 DIAGNOSIS — I10 ESSENTIAL HYPERTENSION WITH GOAL BLOOD PRESSURE LESS THAN 130/80: ICD-10-CM

## 2025-01-30 DIAGNOSIS — E78.5 DYSLIPIDEMIA: ICD-10-CM

## 2025-01-30 PROCEDURE — 3074F SYST BP LT 130 MM HG: CPT | Performed by: FAMILY MEDICINE

## 2025-01-30 PROCEDURE — 3061F NEG MICROALBUMINURIA REV: CPT | Performed by: FAMILY MEDICINE

## 2025-01-30 PROCEDURE — 99214 OFFICE O/P EST MOD 30 MIN: CPT | Performed by: FAMILY MEDICINE

## 2025-01-30 PROCEDURE — 3052F HG A1C>EQUAL 8.0%<EQUAL 9.0%: CPT | Performed by: FAMILY MEDICINE

## 2025-01-30 PROCEDURE — 3078F DIAST BP <80 MM HG: CPT | Performed by: FAMILY MEDICINE

## 2025-01-30 RX ORDER — GLIPIZIDE 10 MG/1
10 TABLET, FILM COATED, EXTENDED RELEASE ORAL 2 TIMES DAILY WITH MEALS
Qty: 180 TABLET | Refills: 3 | Status: SHIPPED | OUTPATIENT
Start: 2025-01-30

## 2025-01-30 NOTE — TELEPHONE ENCOUNTER
Please review; protocol failed.    Requested Prescriptions   Pending Prescriptions Disp Refills    empagliflozin (JARDIANCE) 25 MG Oral Tab 90 tablet 3     Sig: Take 1 tablet (25 mg total) by mouth daily.       Diabetes Medication Protocol Failed - 1/29/2025  9:50 PM        Failed - Last A1C < 7.5 and within past 6 months     Lab Results   Component Value Date    A1C 8.3 (H) 01/21/2025             Passed - In person appointment or virtual visit in the past 6 mos or appointment in next 3 mos     Recent Outpatient Visits              3 months ago Type 2 diabetes mellitus with diabetic neuropathy, without long-term current use of insulin ([facility])    [address] Jose Short MD    Office Visit    6 months ago Wellness examination    [address], MD    Office Visit    8 months ago BPH with obstruction/lower urinary tract symptoms    [facility] Ruslan Montaño MD    Office Visit    9 months ago Type 2 diabetes mellitus with diabetic neuropathy, without long-term current use of insulin ([facility])    [address]    Office Visit    1 year ago Type 2 diabetes mellitus with diabetic neuropathy, without long-term current use of insulin ([facility])    [address] Jose Marie MD    Office Visit          Future Appointments         Provider Department Appt Notes    Tomorrow Jose Marie MD [address] FUP-3 months    In 1 week Wiliam Singh MD [facility] Gastroenterology,  [facility] 12/13/24 scheduled overdue office visit for med refills and to schedule repeat EGD.    In 4 months Ruslan Montaño MD [facility] 1 yr follow up                    Passed - Microalbumin procedure in past 12 months or taking  ACE/ARB        Passed - EGFRCR or GFRNAA > 50     GFR Evaluation  EGFRCR: 64 , resulted on 1/21/2025          Passed - GFR in the past 12 months        Passed - Medication is active on med list

## 2025-01-30 NOTE — PROGRESS NOTES
Torres Darnell  6/13/1950    Chief Complaint   Patient presents with    Follow - Up     3 month DM f/u   Lab results        HPI:   Torres Darnell is a 74 year old male who presents for follow-up. Has been consistent with all his medications. Diet has stable and high in sweets.     Current Outpatient Medications   Medication Sig Dispense Refill    glipiZIDE ER 10 MG Oral Tablet 24 Hr Take 1 tablet (10 mg total) by mouth 2 (two) times daily with meals. 180 tablet 3    SITagliptin Phosphate (JANUVIA) 100 MG Oral Tab Take 1 tablet (100 mg total) by mouth daily. 90 tablet 3    empagliflozin (JARDIANCE) 25 MG Oral Tab Take 1 tablet (25 mg total) by mouth daily. 90 tablet 3    atorvastatin 20 MG Oral Tab Take 1 tablet (20 mg total) by mouth nightly. 90 tablet 0    metFORMIN  MG Oral Tablet 24 Hr Take 2 tablets (1,000 mg total) by mouth 2 (two) times daily with meals. 360 tablet 0    tamsulosin 0.4 MG Oral Cap Take 2 capsules (0.8 mg total) by mouth daily. 180 capsule 5    finasteride 5 MG Oral Tab Take 1 tablet (5 mg total) by mouth daily. 90 tablet 3    atenolol 50 MG Oral Tab Take 1 tablet (50 mg total) by mouth daily. 90 tablet 0    pantoprazole 40 MG Oral Tab EC TAKE 1 TABLET BY MOUTH TWICE DAILY. 30 MINUTES PRIOR TO BREAKFAST AND 30 MINUTES PRIOR TO DINNER 180 tablet 3    Sildenafil Citrate 100 MG Oral Tab Take 1 tablet (100 mg total) by mouth as needed for Erectile Dysfunction. 30 tablet 1    Mirabegron ER 50 MG Oral Tablet 24 Hr Take 1 tablet (50 mg total) by mouth daily. 90 tablet 3    Cyanocobalamin (VITAMIN B-12) 2500 MCG Sublingual SL Tab Place 1,500 mcg under the tongue.  0    AMLODIPINE 2.5 MG Oral Tab Take 1 tablet by mouth once daily 90 tablet 0    BENFOTIAMINE OR Take by mouth.      Coenzyme Q10 (COQ-10 OR) Take by mouth.      aspirin 81 MG Oral Tab EC Take 1 tablet (81 mg total) by mouth daily.  0    losartan 100 MG Oral Tab Take 1 tablet (100 mg total) by mouth daily.      Multiple  Vitamin (TAB-A-VANITA) Oral Tab Take 1 tablet by mouth daily.        Allergies[1]   Past Medical History:    Arthritis    back    Back pain    Calculus of kidney    Coronary atherosclerosis    Diabetes (HCC)    Diabetes mellitus (HCC)    Dizziness    3 episodes of balance issues    Elevated blood pressure reading without diagnosis of hypertension    Encounter for screening fecal occult blood testing    Negative for Occult Blood    Essential hypertension    Frequent urination    Heart attack (HCC)    w/stent placememnt    High blood pressure    High cholesterol    Hyperlipidemia    Kidney stone    Pure hypercholesterolemia    Rash    occassioanal on shin    Sleep disturbance    Stented coronary artery    Visual impairment    contacts and glasses    Wears glasses    Weight loss    Quick 15 lb loss, reason?      Patient Active Problem List   Diagnosis    Tremor of both hands    Poor short term memory    Coronary artery disease involving native heart without angina pectoris    Dyslipidemia    Essential hypertension with goal blood pressure less than 130/80    Thrombocytopenia    Gastroesophageal reflux disease without esophagitis    Chronic pancreatitis (HCC)    Type 2 diabetes mellitus with diabetic neuropathy (HCC)    Angeles's esophagus without dysplasia    Hiatal hernia      Past Surgical History:   Procedure Laterality Date    Angioplasty (coronary)      2 stents    Cath bare metal stent (bms)      Cholecystectomy      1985    Egd N/A 9/30/2016    Procedure: ESOPHAGOGASTRODUODENOSCOPY (EGD);  Surgeon: Marimar Ziegler DO;  Location:  ENDOSCOPY    Neck/chest procedure unlisted  1998    ruptured vertebrae-cervical    Percutaneous  angioplasty  (ptca)- pbp only      Removal gallbladder      Spine surgery procedure unlisted  neck surgery in 2002      Family History   Problem Relation Age of Onset    Other (Acyte Appendicitis) Son     Hypertension Other         fam hx    Diabetes Mother     Diabetes Sister     Diabetes  Sister     Hypertension Sister     Diabetes Brother     Hypertension Brother     Heart Attack Brother     Hypertension Sister     Hypertension Brother     Heart Attack Brother       Social History     Socioeconomic History    Marital status:    Tobacco Use    Smoking status: Never    Smokeless tobacco: Never   Vaping Use    Vaping status: Never Used   Substance and Sexual Activity    Alcohol use: Not Currently     Comment: Quit Jan 2022.  Used to have a [place] a day.    Drug use: No    Sexual activity: Yes     Partners: Female   Other Topics Concern    Caffeine Concern No    Exercise Yes     Social Drivers of Health     Financial Resource Strain: Low Risk  (1/24/2022)    [facility]    Overall Financial Resource Strain (CARDIA)     Difficulty of Paying Living Expenses: Not hard at all   Transportation Needs: No Transportation Needs (1/24/2022)    [facility]    PRAPARE - Transportation     Lack of Transportation (Medical): No     Lack of Transportation (Non-Medical): No         REVIEW OF SYSTEMS:   GENERAL: feels well otherwise, no recent illness.     EXAM:   /64 (BP Location: Right arm, Patient Position: Sitting, Cuff Size: large)   Pulse 68   Temp 97 °F (36.1 °C) (Temporal)   Wt 182 lb (82.6 kg)   SpO2 97%   BMI 23.36 kg/m²   GENERAL: Well developed, well nourished,in no apparent distress  SKIN: No rash  HEENT: atraumatic, normocephalic  LUNGS: clear to auscultation  CARDIO: RRR  Bilateral barefoot skin diabetic exam is normal, visualized feet and the appearance is normal.  Bilateral monofilament/sensation of both feet is normal.  Pulsation pedal pulse exam of both lower legs/feet is normal as well.       ASSESSMENT AND PLAN:   Torres Darnell is a 74 year old male who presents for follow-up    Type 2 diabetes mellitus with diabetic neuropathy  A1c Uncontrolled and stable from previous. We discussed  additional treatment options. Would recommend against use of GLP-1's given his h/o chronic pancreatitis. We discussed other options including Insulin or Pioglitazone. He wishes to avoid the use of more medications as this time and thus discussed need for intense dietary optimization and provided information on carb counting. Will repeat labs in 3 month and follow-up via VV at that time. If control not improved, will need to add additional agent.     Dyslipidemia  LDL at goal    Essential hypertension with goal blood pressure less than 130/80  BP controlled.         All questions were answered and the patient agrees with the plan.     Thank you,  Jose Marie MD         [1] No Known Allergies

## 2025-01-30 NOTE — TELEPHONE ENCOUNTER
Please review; protocol failed.    Requested Prescriptions   Pending Prescriptions Disp Refills    glipiZIDE ER 10 MG Oral Tablet 24 Hr 180 tablet 3     Sig: Take 1 tablet (10 mg total) by mouth 2 (two) times daily with meals.       Diabetes Medication Protocol Failed - 1/29/2025  9:17 PM        Failed - Last A1C < 7.5 and within past 6 months     Lab Results   Component Value Date    A1C 8.3 (H) 01/21/2025             Passed - In person appointment or virtual visit in the past 6 mos or appointment in next 3 mos     Recent Outpatient Visits              3 months ago Type 2 diabetes mellitus with diabetic neuropathy, without long-term current use of insulin ([facility])    [address] Jose Short MD    Office Visit    6 months ago Wellness examination    [address], MD    Office Visit    8 months ago BPH with obstruction/lower urinary tract symptoms    [facility] Ruslan Montaño MD    Office Visit    9 months ago Type 2 diabetes mellitus with diabetic neuropathy, without long-term current use of insulin ([facility])    [address] Jose Short MD    Office Visit    1 year ago Type 2 diabetes mellitus with diabetic neuropathy, without long-term current use of insulin ([facility])    [address] MD    Office Visit          Future Appointments         Provider Department Appt Notes    Tomorrow Jose Marie MD [address] FUP-3 months    In 1 week Wiliam Singh MD [facility] Gastroenterology,  [facility] 12/13/24 scheduled overdue office visit for med refills and to schedule repeat EGD.    In 4 months Ruslan Montaño MD [facility] 1 yr follow up                    Passed - Microalbumin procedure in past  12 months or taking ACE/ARB        Passed - EGFRCR or GFRNAA > 50     GFR Evaluation  EGFRCR: 64 , resulted on 1/21/2025          Passed - GFR in the past 12 months        Passed - Medication is active on med list      Please review; protocol failed.      SITagliptin Phosphate (JANUVIA) 100 MG Oral Tab 90 tablet 3     Sig: Take 1 tablet (100 mg total) by mouth daily.       Diabetes Medication Protocol Failed - 1/29/2025  9:17 PM        Failed - Last A1C < 7.5 and within past 6 months     Lab Results   Component Value Date    A1C 8.3 (H) 01/21/2025             Passed - In person appointment or virtual visit in the past 6 mos or appointment in next 3 mos     Recent Outpatient Visits              3 months ago Type 2 diabetes mellitus with diabetic neuropathy, without long-term current use of insulin ([facility])    [address] MD    Office Visit    6 months ago Wellness examination    [address], MD    Office Visit    8 months ago BPH with obstruction/lower urinary tract symptoms    [facility] Ruslan Calderon MD    Office Visit    9 months ago Type 2 diabetes mellitus with diabetic neuropathy, without long-term current use of insulin ([facility])    [address], MD    Office Visit    1 year ago Type 2 diabetes mellitus with diabetic neuropathy, without long-term current use of insulin ([facility])    [address] Jose Short MD    Office Visit          Future Appointments         Provider Department Appt Notes    Tomorrow Jose Marie MD [address] FU-3 months    In 1 week Wiliam Singh MD [facility] Gastroenterology,  [facility] 12/13/24 scheduled overdue office visit for med refills and to schedule repeat EGD.    In 4 months  Ruslan Montaño MD [facility] 1 yr follow up                    Passed - Microalbumin procedure in past 12 months or taking ACE/ARB        Passed - EGFRCR or GFRNAA > 50     GFR Evaluation  EGFRCR: 64 , resulted on 1/21/2025          Passed - GFR in the past 12 months        Passed - Medication is active on med list

## 2025-03-27 RX ORDER — ATENOLOL 50 MG/1
50 TABLET ORAL DAILY
Qty: 90 TABLET | Refills: 0 | Status: SHIPPED | OUTPATIENT
Start: 2025-03-27

## 2025-03-27 NOTE — TELEPHONE ENCOUNTER
Spoke with patient will call back in a week or so to schedule. PAST MEDICAL HISTORY:  No pertinent past medical history

## 2025-03-27 NOTE — TELEPHONE ENCOUNTER
Refilled per protocol     Hypertension Medications Protocol Passed   ATENOLOL 50 MG Oral Tab [Pharmacy Med Name: Atenolol 50 MG Oral Tablet]  3/24/2025  8:42 AM    CMP or BMP in past 12 months    Last BP reading less than 140/90    In person appointment or virtual visit in the past 12 mos or appointment in next 3 mos    EGFRCR or GFRNAA > 50    Medication is active on med list

## 2025-04-20 DIAGNOSIS — E11.40 TYPE 2 DIABETES MELLITUS WITH DIABETIC NEUROPATHY, WITHOUT LONG-TERM CURRENT USE OF INSULIN (HCC): ICD-10-CM

## 2025-04-23 RX ORDER — MIRABEGRON 50 MG/1
50 TABLET, FILM COATED, EXTENDED RELEASE ORAL DAILY
Qty: 90 TABLET | Refills: 0 | Status: SHIPPED | OUTPATIENT
Start: 2025-04-23 | End: 2026-04-18

## 2025-04-23 NOTE — TELEPHONE ENCOUNTER
Outpatient Medication Detail     Disp Refills Start End    SITagliptin Phosphate (JANUVIA) 100 MG Oral Tab 90 tablet 3 1/30/2025 --    Sig - Route: Take 1 tablet (100 mg total) by mouth daily. - Oral    Sent to pharmacy as: SITagliptin Phosphate 100 MG Oral Tablet (Januvia)    E-Prescribing Status: Receipt confirmed by pharmacy (1/30/2025  8:06 AM CST)      Associated Diagnoses    Type 2 diabetes mellitus with diabetic neuropathy, without long-term current use of insulin ([facility])        Pharmacy    [address] [phone], [phone]

## 2025-04-24 RX ORDER — METFORMIN HYDROCHLORIDE 500 MG/1
1000 TABLET, EXTENDED RELEASE ORAL 2 TIMES DAILY WITH MEALS
Qty: 360 TABLET | Refills: 3 | Status: SHIPPED | OUTPATIENT
Start: 2025-04-24

## 2025-04-25 PROBLEM — Q40.1 HERNIA, HIATUS, CONGENITAL: Status: ACTIVE | Noted: 2025-04-25

## 2025-04-25 PROBLEM — K21.9 GASTROESOPHAGEAL REFLUX DISEASE: Status: ACTIVE | Noted: 2025-04-25

## 2025-04-25 PROCEDURE — 88305 TISSUE EXAM BY PATHOLOGIST: CPT | Performed by: STUDENT IN AN ORGANIZED HEALTH CARE EDUCATION/TRAINING PROGRAM

## 2025-04-28 RX ORDER — METFORMIN HYDROCHLORIDE 500 MG/1
1000 TABLET, EXTENDED RELEASE ORAL 2 TIMES DAILY WITH MEALS
Qty: 360 TABLET | Refills: 0 | OUTPATIENT
Start: 2025-04-28

## 2025-04-30 ENCOUNTER — LAB ENCOUNTER (OUTPATIENT)
Dept: LAB | Age: 75
End: 2025-04-30
Attending: FAMILY MEDICINE
Payer: COMMERCIAL

## 2025-04-30 DIAGNOSIS — E11.40 TYPE 2 DIABETES MELLITUS WITH DIABETIC NEUROPATHY, WITHOUT LONG-TERM CURRENT USE OF INSULIN (HCC): ICD-10-CM

## 2025-04-30 LAB
ALBUMIN SERPL-MCNC: 4.7 G/DL (ref 3.2–4.8)
ALBUMIN/GLOB SERPL: 1.7 {RATIO} (ref 1–2)
ALP LIVER SERPL-CCNC: 62 U/L (ref 45–117)
ALT SERPL-CCNC: 15 U/L (ref 10–49)
ANION GAP SERPL CALC-SCNC: 12 MMOL/L (ref 0–18)
AST SERPL-CCNC: 12 U/L (ref ?–34)
BILIRUB SERPL-MCNC: 0.6 MG/DL (ref 0.2–1.1)
BUN BLD-MCNC: 23 MG/DL (ref 9–23)
CALCIUM BLD-MCNC: 9.8 MG/DL (ref 8.7–10.6)
CHLORIDE SERPL-SCNC: 105 MMOL/L (ref 98–112)
CHOLEST SERPL-MCNC: 119 MG/DL (ref ?–200)
CO2 SERPL-SCNC: 24 MMOL/L (ref 21–32)
CREAT BLD-MCNC: 1.16 MG/DL (ref 0.7–1.3)
EGFRCR SERPLBLD CKD-EPI 2021: 67 ML/MIN/1.73M2 (ref 60–?)
EST. AVERAGE GLUCOSE BLD GHB EST-MCNC: 160 MG/DL (ref 68–126)
FASTING PATIENT LIPID ANSWER: YES
FASTING STATUS PATIENT QL REPORTED: YES
GLOBULIN PLAS-MCNC: 2.7 G/DL (ref 2–3.5)
GLUCOSE BLD-MCNC: 164 MG/DL (ref 70–99)
HBA1C MFR BLD: 7.2 % (ref ?–5.7)
HDLC SERPL-MCNC: 40 MG/DL (ref 40–59)
LDLC SERPL CALC-MCNC: 53 MG/DL (ref ?–100)
NONHDLC SERPL-MCNC: 79 MG/DL (ref ?–130)
OSMOLALITY SERPL CALC.SUM OF ELEC: 299 MOSM/KG (ref 275–295)
POTASSIUM SERPL-SCNC: 4.5 MMOL/L (ref 3.5–5.1)
PROT SERPL-MCNC: 7.4 G/DL (ref 5.7–8.2)
SODIUM SERPL-SCNC: 141 MMOL/L (ref 136–145)
TRIGL SERPL-MCNC: 155 MG/DL (ref 30–149)
VLDLC SERPL CALC-MCNC: 22 MG/DL (ref 0–30)

## 2025-04-30 PROCEDURE — 36415 COLL VENOUS BLD VENIPUNCTURE: CPT

## 2025-04-30 PROCEDURE — 83036 HEMOGLOBIN GLYCOSYLATED A1C: CPT

## 2025-04-30 PROCEDURE — 80053 COMPREHEN METABOLIC PANEL: CPT

## 2025-04-30 PROCEDURE — 80061 LIPID PANEL: CPT

## 2025-05-01 NOTE — TELEPHONE ENCOUNTER
Due for DM follow-up. Please call to schedule.
Last seen 12/22/22    Follow-up in 3 months with labs prior. PSR's please call to schedule follow up visit   Over due    Routing to PCP for refills.
07-May-2025

## 2025-05-12 ENCOUNTER — TELEPHONE (OUTPATIENT)
Age: 75
End: 2025-05-12

## 2025-05-12 DIAGNOSIS — Z00.00 ROUTINE GENERAL MEDICAL EXAMINATION AT A HEALTH CARE FACILITY: ICD-10-CM

## 2025-05-12 DIAGNOSIS — Z13.1 SCREENING FOR DIABETES MELLITUS: ICD-10-CM

## 2025-05-12 DIAGNOSIS — Z13.0 SCREENING FOR DEFICIENCY ANEMIA: ICD-10-CM

## 2025-05-12 DIAGNOSIS — Z12.5 SCREENING FOR MALIGNANT NEOPLASM OF PROSTATE: ICD-10-CM

## 2025-05-12 DIAGNOSIS — Z13.220 SCREENING FOR LIPID DISORDERS: Primary | ICD-10-CM

## 2025-05-12 NOTE — TELEPHONE ENCOUNTER
Patient had labs done in January and some in April. Has a physical on   Future Appointments   Date Time Provider Department Center   7/24/2025 10:30 AM Jose Marie MD [facility]      Does he need any additional labs?

## 2025-05-13 NOTE — TELEPHONE ENCOUNTER
Labs entered and sign ordered  Active lab orders placed per [facility] protocol      Annual physical: 7/24/2025 at 10:30am

## 2025-06-04 NOTE — ED AVS SNAPSHOT
Mr. Yumi Menon   MRN: [id number]    Department:  [facility] Emergency Department   Date of Visit:  11/30/2018           Disclosure     Insurance plans vary and the physician(s) referred by the ER may not be covered by your plan.  Please conta What Type Of Note Output Would You Prefer (Optional)?: Bullet Format What Is The Reason For Today's Visit?: Full Body Skin Examination tell this physician (or your personal doctor if your instructions are to return to your personal doctor) about any new or lasting problems. The primary care or specialist physician will see patients referred from the [facility] Emergency Department.  Shelton Lombard What Is The Reason For Today's Visit? (Being Monitored For X): concerning skin lesions on an annual basis Additional History: Pt states she has no concerns today

## 2025-06-24 RX ORDER — ATENOLOL 50 MG/1
50 TABLET ORAL DAILY
Qty: 90 TABLET | Refills: 0 | Status: SHIPPED | OUTPATIENT
Start: 2025-06-24

## 2025-06-24 NOTE — TELEPHONE ENCOUNTER
Last VISIT:01/30/2025      Last CPE: 07/18/2024 MK    Last REFILL:03/27/2025   Medication Quantity Refills Start End   ATENOLOL 50 MG Oral Tab 90 tablet 0          Last LABS: 04/30/2025     No future appointments.     Per PROTOCOL? Passed protocol

## 2025-07-14 ENCOUNTER — TELEPHONE (OUTPATIENT)
Age: 75
End: 2025-07-14

## 2025-07-14 DIAGNOSIS — N40.0 BENIGN PROSTATIC HYPERPLASIA, UNSPECIFIED WHETHER LOWER URINARY TRACT SYMPTOMS PRESENT: Primary | ICD-10-CM

## 2025-07-14 DIAGNOSIS — N52.9 ERECTILE DYSFUNCTION, UNSPECIFIED ERECTILE DYSFUNCTION TYPE: ICD-10-CM

## 2025-07-14 NOTE — TELEPHONE ENCOUNTER
Referral:  Torres Darnell  LOV: 1/30/2025  Calling for a referral to:    Physician and Practice Name: Ruslan Montaño   Specialty: Urology   Concern/condition: BPH, frequency,ED   Does patient have a scheduled Appointment?: 7/31/25

## 2025-07-24 ENCOUNTER — OFFICE VISIT (OUTPATIENT)
Age: 75
End: 2025-07-24
Payer: COMMERCIAL

## 2025-07-24 VITALS
HEIGHT: 74.5 IN | SYSTOLIC BLOOD PRESSURE: 102 MMHG | RESPIRATION RATE: 16 BRPM | HEART RATE: 62 BPM | WEIGHT: 178 LBS | BODY MASS INDEX: 22.6 KG/M2 | DIASTOLIC BLOOD PRESSURE: 58 MMHG

## 2025-07-24 DIAGNOSIS — K86.1 CHRONIC PANCREATITIS, UNSPECIFIED PANCREATITIS TYPE (HCC): ICD-10-CM

## 2025-07-24 DIAGNOSIS — E11.40 TYPE 2 DIABETES MELLITUS WITH DIABETIC NEUROPATHY, WITHOUT LONG-TERM CURRENT USE OF INSULIN (HCC): ICD-10-CM

## 2025-07-24 DIAGNOSIS — I25.10 CORONARY ARTERY DISEASE INVOLVING NATIVE HEART WITHOUT ANGINA PECTORIS, UNSPECIFIED VESSEL OR LESION TYPE: ICD-10-CM

## 2025-07-24 DIAGNOSIS — K21.9 GASTROESOPHAGEAL REFLUX DISEASE WITHOUT ESOPHAGITIS: ICD-10-CM

## 2025-07-24 DIAGNOSIS — E78.5 DYSLIPIDEMIA: ICD-10-CM

## 2025-07-24 DIAGNOSIS — K22.70 BARRETT'S ESOPHAGUS WITHOUT DYSPLASIA: ICD-10-CM

## 2025-07-24 DIAGNOSIS — Z00.00 WELLNESS EXAMINATION: Primary | ICD-10-CM

## 2025-07-24 DIAGNOSIS — I10 ESSENTIAL HYPERTENSION WITH GOAL BLOOD PRESSURE LESS THAN 130/80: ICD-10-CM

## 2025-07-30 ENCOUNTER — LAB ENCOUNTER (OUTPATIENT)
Dept: LAB | Age: 75
End: 2025-07-30
Attending: FAMILY MEDICINE

## 2025-07-30 DIAGNOSIS — Z13.1 SCREENING FOR DIABETES MELLITUS: ICD-10-CM

## 2025-07-30 DIAGNOSIS — Z13.0 SCREENING FOR DEFICIENCY ANEMIA: ICD-10-CM

## 2025-07-30 DIAGNOSIS — Z12.5 SCREENING FOR MALIGNANT NEOPLASM OF PROSTATE: ICD-10-CM

## 2025-07-30 DIAGNOSIS — Z13.220 SCREENING FOR LIPID DISORDERS: ICD-10-CM

## 2025-07-30 DIAGNOSIS — E11.40 TYPE 2 DIABETES MELLITUS WITH DIABETIC NEUROPATHY, WITHOUT LONG-TERM CURRENT USE OF INSULIN (HCC): ICD-10-CM

## 2025-07-30 DIAGNOSIS — Z00.00 ROUTINE GENERAL MEDICAL EXAMINATION AT A HEALTH CARE FACILITY: ICD-10-CM

## 2025-07-30 LAB
ALBUMIN SERPL-MCNC: 4.4 G/DL (ref 3.2–4.8)
ALBUMIN/GLOB SERPL: 1.6 (ref 1–2)
ALP LIVER SERPL-CCNC: 60 U/L (ref 45–117)
ALT SERPL-CCNC: 17 U/L (ref 10–49)
ANION GAP SERPL CALC-SCNC: 7 MMOL/L (ref 0–18)
AST SERPL-CCNC: 12 U/L (ref ?–34)
BASOPHILS # BLD AUTO: 0.02 X10(3) UL (ref 0–0.2)
BASOPHILS NFR BLD AUTO: 0.3 %
BILIRUB SERPL-MCNC: 0.5 MG/DL (ref 0.2–1.1)
BUN BLD-MCNC: 17 MG/DL (ref 9–23)
CALCIUM BLD-MCNC: 9.7 MG/DL (ref 8.7–10.6)
CHLORIDE SERPL-SCNC: 101 MMOL/L (ref 98–112)
CHOLEST SERPL-MCNC: 119 MG/DL (ref ?–200)
CO2 SERPL-SCNC: 29 MMOL/L (ref 21–32)
COMPLEXED PSA SERPL-MCNC: 0.4 NG/ML (ref ?–4)
CREAT BLD-MCNC: 1.02 MG/DL (ref 0.7–1.3)
EGFRCR SERPLBLD CKD-EPI 2021: 77 ML/MIN/1.73M2 (ref 60–?)
EOSINOPHIL # BLD AUTO: 0.18 X10(3) UL (ref 0–0.7)
EOSINOPHIL NFR BLD AUTO: 2.5 %
ERYTHROCYTE [DISTWIDTH] IN BLOOD BY AUTOMATED COUNT: 14.3 %
EST. AVERAGE GLUCOSE BLD GHB EST-MCNC: 163 MG/DL (ref 68–126)
FASTING PATIENT LIPID ANSWER: YES
FASTING STATUS PATIENT QL REPORTED: YES
GLOBULIN PLAS-MCNC: 2.8 G/DL (ref 2–3.5)
GLUCOSE BLD-MCNC: 155 MG/DL (ref 70–99)
HBA1C MFR BLD: 7.3 % (ref ?–5.7)
HCT VFR BLD AUTO: 42.2 % (ref 39–53)
HDLC SERPL-MCNC: 46 MG/DL (ref 40–59)
HGB BLD-MCNC: 13.5 G/DL (ref 13–17.5)
IMM GRANULOCYTES # BLD AUTO: 0.02 X10(3) UL (ref 0–1)
IMM GRANULOCYTES NFR BLD: 0.3 %
LDLC SERPL CALC-MCNC: 56 MG/DL (ref ?–100)
LYMPHOCYTES # BLD AUTO: 2.04 X10(3) UL (ref 1–4)
LYMPHOCYTES NFR BLD AUTO: 28.3 %
MCH RBC QN AUTO: 29 PG (ref 26–34)
MCHC RBC AUTO-ENTMCNC: 32 G/DL (ref 31–37)
MCV RBC AUTO: 90.8 FL (ref 80–100)
MONOCYTES # BLD AUTO: 0.85 X10(3) UL (ref 0.1–1)
MONOCYTES NFR BLD AUTO: 11.8 %
NEUTROPHILS # BLD AUTO: 4.09 X10 (3) UL (ref 1.5–7.7)
NEUTROPHILS # BLD AUTO: 4.09 X10(3) UL (ref 1.5–7.7)
NEUTROPHILS NFR BLD AUTO: 56.8 %
NONHDLC SERPL-MCNC: 73 MG/DL (ref ?–130)
OSMOLALITY SERPL CALC.SUM OF ELEC: 289 MOSM/KG (ref 275–295)
PLATELET # BLD AUTO: 180 10(3)UL (ref 150–450)
POTASSIUM SERPL-SCNC: 4.6 MMOL/L (ref 3.5–5.1)
PROT SERPL-MCNC: 7.2 G/DL (ref 5.7–8.2)
RBC # BLD AUTO: 4.65 X10(6)UL (ref 3.8–5.8)
SODIUM SERPL-SCNC: 137 MMOL/L (ref 136–145)
TRIGL SERPL-MCNC: 90 MG/DL (ref 30–149)
VLDLC SERPL CALC-MCNC: 13 MG/DL (ref 0–30)
WBC # BLD AUTO: 7.2 X10(3) UL (ref 4–11)

## 2025-07-30 PROCEDURE — 85025 COMPLETE CBC W/AUTO DIFF WBC: CPT

## 2025-07-30 PROCEDURE — 36415 COLL VENOUS BLD VENIPUNCTURE: CPT

## 2025-07-30 PROCEDURE — 83036 HEMOGLOBIN GLYCOSYLATED A1C: CPT

## 2025-07-30 PROCEDURE — 80053 COMPREHEN METABOLIC PANEL: CPT

## 2025-07-30 PROCEDURE — 80061 LIPID PANEL: CPT

## 2025-07-31 ENCOUNTER — OFFICE VISIT (OUTPATIENT)
Dept: SURGERY | Facility: CLINIC | Age: 75
End: 2025-07-31
Payer: COMMERCIAL

## 2025-07-31 DIAGNOSIS — N40.1 BPH WITH OBSTRUCTION/LOWER URINARY TRACT SYMPTOMS: Primary | ICD-10-CM

## 2025-07-31 DIAGNOSIS — R35.0 BENIGN PROSTATIC HYPERPLASIA WITH URINARY FREQUENCY: ICD-10-CM

## 2025-07-31 DIAGNOSIS — N32.81 OAB (OVERACTIVE BLADDER): ICD-10-CM

## 2025-07-31 DIAGNOSIS — N40.1 BENIGN PROSTATIC HYPERPLASIA WITH URINARY FREQUENCY: ICD-10-CM

## 2025-07-31 DIAGNOSIS — N13.8 BPH WITH OBSTRUCTION/LOWER URINARY TRACT SYMPTOMS: Primary | ICD-10-CM

## 2025-07-31 PROCEDURE — 99214 OFFICE O/P EST MOD 30 MIN: CPT | Performed by: SURGERY

## 2025-07-31 PROCEDURE — G2211 COMPLEX E/M VISIT ADD ON: HCPCS | Performed by: SURGERY

## 2025-07-31 PROCEDURE — 51798 US URINE CAPACITY MEASURE: CPT | Performed by: SURGERY

## 2025-07-31 RX ORDER — TAMSULOSIN HYDROCHLORIDE 0.4 MG/1
0.8 CAPSULE ORAL DAILY
Qty: 180 CAPSULE | Refills: 5 | Status: SHIPPED | OUTPATIENT
Start: 2025-07-31

## 2025-07-31 RX ORDER — TROSPIUM CHLORIDE ER 60 MG/1
60 CAPSULE ORAL DAILY
Qty: 90 CAPSULE | Refills: 5 | Status: SHIPPED | OUTPATIENT
Start: 2025-07-31

## 2025-07-31 RX ORDER — MIRABEGRON 50 MG/1
50 TABLET, FILM COATED, EXTENDED RELEASE ORAL DAILY
Qty: 90 TABLET | Refills: 5 | Status: SHIPPED | OUTPATIENT
Start: 2025-07-31

## 2025-07-31 RX ORDER — FINASTERIDE 5 MG/1
5 TABLET, FILM COATED ORAL DAILY
Qty: 90 TABLET | Refills: 3 | Status: SHIPPED | OUTPATIENT
Start: 2025-07-31

## 2025-08-14 DIAGNOSIS — N52.9 ERECTILE DYSFUNCTION, UNSPECIFIED ERECTILE DYSFUNCTION TYPE: ICD-10-CM

## 2025-08-18 RX ORDER — SILDENAFIL 100 MG/1
100 TABLET, FILM COATED ORAL AS NEEDED
Qty: 30 TABLET | Refills: 0 | Status: SHIPPED | OUTPATIENT
Start: 2025-08-18

## (undated) DIAGNOSIS — R93.7 ABNORMAL CT OF SPINE: Primary | ICD-10-CM

## (undated) DIAGNOSIS — Z02.9 ENCOUNTERS FOR UNSPECIFIED ADMINISTRATIVE PURPOSE: ICD-10-CM

## (undated) DIAGNOSIS — Z09 FOLLOW UP: ICD-10-CM

## (undated) DEVICE — NEEDLE ELECTRODE: Brand: EDGE

## (undated) DEVICE — ENDOSCOPY PACK UPPER: Brand: MEDLINE INDUSTRIES, INC.

## (undated) DEVICE — REM POLYHESIVE ADULT PATIENT RETURN ELECTRODE: Brand: VALLEYLAB

## (undated) DEVICE — MINI LAP PACK-LF: Brand: MEDLINE INDUSTRIES, INC.

## (undated) DEVICE — SINU FOAM: Brand: SINU-FOAM

## (undated) DEVICE — SINUS CDS: Brand: MEDLINE INDUSTRIES, INC.

## (undated) DEVICE — SUTURE CHROMIC GUT 4-0 SH

## (undated) DEVICE — KENDALL SCD EXPRESS SLEEVES, KNEE LENGTH, MEDIUM: Brand: KENDALL SCD

## (undated) DEVICE — PETROLATUM GAUZE TUBE FOIL,OVERWRAP: Brand: VASELINE

## (undated) DEVICE — Device

## (undated) DEVICE — STERILE POLYISOPRENE POWDER-FREE SURGICAL GLOVES: Brand: PROTEXIS

## (undated) DEVICE — INSTRUMENT TRACKER 9733533XOM ENT 1PK

## (undated) DEVICE — NEEDLE SPINAL 25X3-1/2 BLUE

## (undated) DEVICE — ENTACT SEPTAL STAPLER 3 PACK: Brand: ENT SINUS

## (undated) DEVICE — SPNG DRESS 8X4IN NLTX STRL 12

## (undated) DEVICE — PREMIUM WET SKIN PREP TRAY: Brand: MEDLINE INDUSTRIES, INC.

## (undated) DEVICE — Device: Brand: NAKAO QUICKTRAP

## (undated) DEVICE — BLADE 1884004 TRICUT 5PK 4MM: Brand: TRICUT®

## (undated) DEVICE — BLADE 1884080EM TRICUT 4MMX13CM M4 ROHS: Brand: FUSION®

## (undated) DEVICE — ENT COBLATOR II, REFLEX ULTRA PTR                                    WITH INTEGRATED CABLE: Brand: COBLATION

## (undated) DEVICE — PATIENT TRACKER 9734887XOM NON-INVASIVE

## (undated) DEVICE — 3M™ RED DOT™ MONITORING ELECTRODE WITH FOAM TAPE AND STICKY GEL, 50/BAG, 20/CASE, 72/PLT 2570: Brand: RED DOT™

## (undated) DEVICE — 1200CC GUARDIAN II: Brand: GUARDIAN

## (undated) DEVICE — SOL  .9 1000ML BTL

## (undated) DEVICE — TUBING 1895522 5PK STRAIGHTSHOT TO XPS: Brand: STRAIGHTSHOT®

## (undated) DEVICE — FILTERLINE NASAL ADULT O2/CO2

## (undated) DEVICE — GOWN,SIRUS,FABRIC-REINFORCED,X-LARGE: Brand: MEDLINE

## (undated) DEVICE — POSITIONER OR 9X8X4.5IN NLTX

## (undated) DEVICE — DRESSING NASAL MEROGEL XOMED

## (undated) DEVICE — STRETCH BANDAGE: Brand: CURITY

## (undated) NOTE — LETTER
[facility] Testing Department  Phone: [phone]  Right Fax: [phone]  \[facility]\""k 20 By:  Tunde Maldonado RN Date: 3/8/19    Patient Name: Diann Forbes  Surgery Date: 3/14/2019    CSN: [id number]  Medical Record: [id number]   D

## (undated) NOTE — LETTER
Date: 4/5/2022    Patient Name: Janet Jim          To Whom it may concern: This letter has been written at the patient's request. The above patient was seen at the [facility] for treatment of a medical condition. The patient may return to work on 4/8/22 without restriction.         Sincerely,    Kerline Gomes MD

## (undated) NOTE — MR AVS SNAPSHOT
After Visit Summary   5/19/2021    Taylor Argueta    MRN: [id number]           Visit Information     Date & Time  5/19/2021  2:15 PM Provider  Yayo Yañez MD Department  [address] [place] [address] Dept.  Phone  [phone]      Allergies as o [facility] now offers Video Visits through [address] for adult and pediatric patients. Video Visits are available Monday - Friday for many common conditions such as allergies, colds, cough, fever, rash, sore throat, headache and pink eye.   The cost for a [employer]   Monday – Friday  4:00 pm – 10:00 pm   Saturday – Sunday  10:00 am – 4:00 pm  WALK-IN CARE  Emergency Medicine Providers  Conditions needing urgent attention, but are   non-life-threatening.     Also available by appointment Average cost  $120*

## (undated) NOTE — LETTER
[facility] Testing Department  Phone: [phone]  OUTSIDE TESTING RESULT REQUEST      TO:   Dr. Candace Pike and staff       Today's Date: 3/8/19    FAX #: [phone]     IMPORTANT: FOR YOUR IMMEDIATE ATTENTION    Please FAX all test result

## (undated) NOTE — LETTER
Letitia Romano [address]  [address] [facility] [address] [facility]  Authorization for Surgical Operation and Procedure     Date:___________                                                                                                         Time:__________ 4.   Should the need arise during my operation or immediate post-operative period, I also consent to the administration of blood and/or blood products.   Further, I understand that despite careful testing and screening of blood or blood products by jay 8.   I recognize that in the event my procedure results in extended X-Ray/fluoroscopy time, I may develop a skin reaction. 9.  If I have a Do Not Attempt Resuscitation (DNAR) order in place, that status will be suspended while in the operating room, proc 1. ILucho agree to be cared for by an anesthesiologist, who is specially trained to monitor me and give me medicine to put me to sleep or keep me comfortable during my procedure    I understand that my anesthesiologist is not an employee or ag 5. My doctor has explained to me other choices available to me for my care (alternatives).   6. Pregnant Patients (“epidural”):  I understand that the risks of having an epidural (medicine given into my back to help control pain during labor), include itchi

## (undated) NOTE — LETTER
06/18/18        [address] [facility] [address]      Dear Naveen Oneal,    [address] [facility] records indicate that you have outstanding FASTING lab work and or testing that was ordered for you and has not yet been completed:          CMP       Lipids

## (undated) NOTE — LETTER
Letitia Romano [address]  [address] [facility] [address], [address] [facility]  Authorization for Surgical Operation and Procedure     Date:___________                                                                                                         Time:__________ 4.   Should the need arise during my operation or immediate post-operative period, I also consent to the administration of blood and/or blood products.   Further, I understand that despite careful testing and screening of blood or blood products by jay 8.   I recognize that in the event my procedure results in extended X-Ray/fluoroscopy time, I may develop a skin reaction. 9.  If I have a Do Not Attempt Resuscitation (DNAR) order in place, that status will be suspended while in the operating room, proc 1. ITaylor agree to be cared for by an anesthesiologist, who is specially trained to monitor me and give me medicine to put me to sleep or keep me comfortable during my procedure    I understand that my anesthesiologist is not an employee or ag 5. My doctor has explained to me other choices available to me for my care (alternatives).   6. Pregnant Patients (“epidural”):  I understand that the risks of having an epidural (medicine given into my back to help control pain during labor), include itchi

## (undated) NOTE — LETTER
Date: 11/24/2021    Patient Name: Kristi Smith          To Whom it may concern: This letter has been written at the patient's request. The above patient was seen at the [facility] for treatment of a medical condition.       Mr. Sonia Conte

## (undated) NOTE — LETTER
Letitia Romano [address] [facility] [address], [address] [facility]  Authorization for Surgical Operation and Procedure   Date:___________                                                                                            Time:__________  1. I hereby authorize Cecilia PERALES MD, my physician and his/her assistants (if applicable), which may include medical students, residents, and/or fellows, to perform the following surgical operation/ procedure and administer such anesthesia as may be determined necessary by my physician:  Operation/Procedure name (s) Endoscopic ultrasound with possible biopsy, cautery, fine needle aspiration with Mac Anesthesia  on [address]   2. I recognize that during the surgical operation/procedure, unforeseen conditions may necessitate additional or different procedures than those listed above. I, therefore, further authorize and request that the above-named surgeon, assistants, or designees perform such procedures as are, in their judgment, necessary and desirable. 3.   My surgeon/physician has discussed prior to my surgery the potential benefits, risks and side effects of this procedure; the likelihood of achieving goals; and potential problems that might occur during recuperation. They also discussed reasonable alternatives to the procedure, including risks, benefits, and side effects related to the alternatives and risks related to not receiving this procedure. I have had all my questions answered and I acknowledge that no guarantee has been made as to the result that may be obtained. 4.   Should the need arise during my operation or immediate post-operative period, I also consent to the administration of blood and/or blood products. Further, I understand that despite careful testing and screening of blood or blood products by collecting agencies, I may still be subject to ill effects as a result of receiving a blood transfusion and/or blood products.   The following are some, but not all, of the potential risks that can occur: fever and allergic reactions, hemolytic reactions, transmission of diseases such as Hepatitis, AIDS and Cytomegalovirus (CMV) and fluid overload. In the event that I wish to have an autologous transfusion of my own blood, or a directed donor transfusion. I will discuss this with my physician. 5.   I authorize the use of any specimen, organs, tissues, body parts or foreign objects that may be removed from my body during the operation/procedure for diagnosis, research or teaching purposes and their subsequent disposal by hospital authorities. I also authorize the release of specimen test results and/or written reports to my treating physician on the hospital medical staff or other referring or consulting physicians involved in my care, at the discretion of the Pathologist or my treating physician. 6.   I consent to the photographing or videotaping of the operations or procedures to be performed, including appropriate portions of my body for medical, scientific, or educational purposes, provided my identity is not revealed by the pictures or by descriptive texts accompanying them. If the procedure has been photographed/videotaped, the surgeon will obtain the original picture, image, videotape or CD. The hospital will not be responsible for storage, release or maintenance of the picture, image, tape or CD.    7.   I consent to the presence of a  or observers in the operating room as deemed necessary by my physician or their designees. 8.   I recognize that in the event my procedure results in extended X-Ray/fluoroscopy time, I may develop a skin reaction. 9. If I have a Do Not Attempt Resuscitation (DNAR) order in place, that status will be suspended while in the operating room, procedural suite, and during the recovery period unless otherwise explicitly stated by me (or a person authorized to consent on my behalf).  The surgeon or my attending physician will determine when the applicable recovery period ends for purposes of reinstating the DNAR order. 10. Patients having a sterilization procedure: I understand that if the procedure is successful the results will be permanent and it will therefore be impossible for me to inseminate, conceive, or bear children. I also understand that the procedure is intended to result in sterility, although the result has not been guaranteed. 11. I acknowledge that my physician has explained sedation/analgesia administration to me including the risk and benefits I consent to the administration of sedation/analgesia as may be necessary or desirable in the judgment of my physician.     I CERTIFY THAT I HAVE READ AND FULLY UNDERSTAND THE ABOVE CONSENT TO OPERATION and/or OTHER PROCEDURE.      _________________________________________  __________________________________  Signature of Patient     Signature of Responsible Person         ___________________________________         Printed Name of Responsible Person           _________________________________                 Relationship to Patient  _________________________________________  ______________________________  Signature of Witness          Date  Time          Patient Name: Corrinne Smack     : 1950                 Printed: 2022     Medical Record #: [id number]                                            Page 1 of 1

## (undated) NOTE — MR AVS SNAPSHOT
[facility] [address] [facility] [place] [id number]  [phone]               Thank you for choosing us for your health care visit with Cindi Toth PA-C.   We are glad to serve you and happy to provide you with this heard later than the day before your exam as you will be drinking contrast at home prior to your appointment. Contrast must be dispensed by a CT Technologist or nurse.  Generally you will not encounter a wait, however please be aware you may experience a wait of CT ABDOMEN+PELVIS(ALL W+WO)(CPT=74178)    Complete by: Mar 22, 2017 (Approximate)    Assoc Dx: Hematuria [R31.9]           CBC W Differential W Platelet [E]    Complete by: Mar 22, 2017 (Approximate)    Assoc Dx:   Hematuria [R31.9]           Comp Metab MetFORMIN HCl  MG Tb24   Take 1 tablet (500 mg total) by mouth every evening. Commonly known as:  GLUCOPHAGE-XR           MULTI FOR HIM 50+ OR   Take  by mouth.            simvastatin 80 MG Tabs   TAKE ONE TABLET BY MOUTH IN THE EVENING   Commonly

## (undated) NOTE — LETTER
10/06/20        Pepe Darnell  [address] [address]      Dear Eliecer Mcrae,    [address] records indicate that you have outstanding lab work and or testing that was ordered for you and has not yet been completed:  Orders Placed This Encounter

## (undated) NOTE — LETTER
Date: 11/24/2021    Patient Name: Charisma Duvall                To Whom it may concern: This letter has been written at the patient's request. The above patient was seen at the [facility] for treatment of a medical condition.     Mr. Esau Mackenzie

## (undated) NOTE — ED AVS SNAPSHOT
Mr. Jennifer Giron   MRN: [id number]    Department:  [facility] Emergency Department   Date of Visit:  9/15/2018           Disclosure     Insurance plans vary and the physician(s) referred by the ER may not be covered by your plan.  Please contac tell this physician (or your personal doctor if your instructions are to return to your personal doctor) about any new or lasting problems. The primary care or specialist physician will see patients referred from the [facility] Emergency Department.  Vinay Diaz